# Patient Record
Sex: MALE | Race: OTHER | NOT HISPANIC OR LATINO | URBAN - METROPOLITAN AREA
[De-identification: names, ages, dates, MRNs, and addresses within clinical notes are randomized per-mention and may not be internally consistent; named-entity substitution may affect disease eponyms.]

---

## 2018-06-18 ENCOUNTER — INPATIENT (INPATIENT)
Facility: HOSPITAL | Age: 58
LOS: 22 days | Discharge: HOME | DRG: 871 | End: 2018-07-11
Attending: SURGERY | Admitting: SURGERY
Payer: COMMERCIAL

## 2018-06-18 VITALS
TEMPERATURE: 98 F | OXYGEN SATURATION: 95 % | DIASTOLIC BLOOD PRESSURE: 96 MMHG | WEIGHT: 237 LBS | SYSTOLIC BLOOD PRESSURE: 136 MMHG | RESPIRATION RATE: 20 BRPM | HEART RATE: 134 BPM | HEIGHT: 74 IN

## 2018-06-18 LAB
ALBUMIN SERPL ELPH-MCNC: 4.7 G/DL — SIGNIFICANT CHANGE UP (ref 3.3–5)
ALP SERPL-CCNC: 184 U/L — HIGH (ref 40–120)
ALT FLD-CCNC: 604 U/L — HIGH (ref 10–45)
ANION GAP SERPL CALC-SCNC: 20 MMOL/L — HIGH (ref 5–17)
APPEARANCE UR: ABNORMAL
APTT BLD: 24.9 SEC — LOW (ref 27.5–37.4)
AST SERPL-CCNC: 117 U/L — HIGH (ref 10–40)
BASE EXCESS BLDV CALC-SCNC: 2.3 MMOL/L — SIGNIFICANT CHANGE UP
BASOPHILS NFR BLD AUTO: 0.1 % — SIGNIFICANT CHANGE UP (ref 0–2)
BILIRUB SERPL-MCNC: 1.5 MG/DL — HIGH (ref 0.2–1.2)
BILIRUB UR-MCNC: NEGATIVE — SIGNIFICANT CHANGE UP
BLD GP AB SCN SERPL QL: NEGATIVE — SIGNIFICANT CHANGE UP
BUN SERPL-MCNC: 39 MG/DL — HIGH (ref 7–23)
CALCIUM SERPL-MCNC: 10 MG/DL — SIGNIFICANT CHANGE UP (ref 8.4–10.5)
CHLORIDE SERPL-SCNC: 84 MMOL/L — LOW (ref 96–108)
CK MB CFR SERPL CALC: 3.3 NG/ML — SIGNIFICANT CHANGE UP (ref 0–6.7)
CK SERPL-CCNC: 167 U/L — SIGNIFICANT CHANGE UP (ref 30–200)
CO2 SERPL-SCNC: 28 MMOL/L — SIGNIFICANT CHANGE UP (ref 22–31)
COLOR SPEC: YELLOW — SIGNIFICANT CHANGE UP
CREAT SERPL-MCNC: 1.95 MG/DL — HIGH (ref 0.5–1.3)
DIFF PNL FLD: ABNORMAL
GAS PNL BLDV: SIGNIFICANT CHANGE UP
GLUCOSE SERPL-MCNC: 209 MG/DL — HIGH (ref 70–99)
GLUCOSE UR QL: NEGATIVE — SIGNIFICANT CHANGE UP
HCO3 BLDV-SCNC: 27 MMOL/L — SIGNIFICANT CHANGE UP (ref 20–27)
HCT VFR BLD CALC: 54.4 % — HIGH (ref 39–50)
HGB BLD-MCNC: 18.7 G/DL — HIGH (ref 13–17)
INR BLD: 1.12 — SIGNIFICANT CHANGE UP (ref 0.88–1.16)
KETONES UR-MCNC: NEGATIVE — SIGNIFICANT CHANGE UP
LACTATE SERPL-SCNC: 5.3 MMOL/L — CRITICAL HIGH (ref 0.5–2)
LEUKOCYTE ESTERASE UR-ACNC: NEGATIVE — SIGNIFICANT CHANGE UP
LIDOCAIN IGE QN: >600 U/L — HIGH (ref 7–60)
LYMPHOCYTES # BLD AUTO: 7.8 % — LOW (ref 13–44)
MCHC RBC-ENTMCNC: 29.5 PG — SIGNIFICANT CHANGE UP (ref 27–34)
MCHC RBC-ENTMCNC: 34.4 G/DL — SIGNIFICANT CHANGE UP (ref 32–36)
MCV RBC AUTO: 85.8 FL — SIGNIFICANT CHANGE UP (ref 80–100)
MONOCYTES NFR BLD AUTO: 10 % — SIGNIFICANT CHANGE UP (ref 2–14)
NEUTROPHILS NFR BLD AUTO: 82.1 % — HIGH (ref 43–77)
NITRITE UR-MCNC: NEGATIVE — SIGNIFICANT CHANGE UP
OB PNL STL: NEGATIVE — SIGNIFICANT CHANGE UP
PCO2 BLDV: 42 MMHG — SIGNIFICANT CHANGE UP (ref 41–51)
PH BLDV: 7.43 — SIGNIFICANT CHANGE UP (ref 7.32–7.43)
PH UR: 5 — SIGNIFICANT CHANGE UP (ref 5–8)
PLATELET # BLD AUTO: 313 K/UL — SIGNIFICANT CHANGE UP (ref 150–400)
PO2 BLDV: 55 MMHG — SIGNIFICANT CHANGE UP
POTASSIUM SERPL-MCNC: 4.2 MMOL/L — SIGNIFICANT CHANGE UP (ref 3.5–5.3)
POTASSIUM SERPL-SCNC: 4.2 MMOL/L — SIGNIFICANT CHANGE UP (ref 3.5–5.3)
PROT SERPL-MCNC: 9 G/DL — HIGH (ref 6–8.3)
PROT UR-MCNC: 30 MG/DL
PROTHROM AB SERPL-ACNC: 12.5 SEC — SIGNIFICANT CHANGE UP (ref 9.8–12.7)
RBC # BLD: 6.34 M/UL — HIGH (ref 4.2–5.8)
RBC # FLD: 14.4 % — SIGNIFICANT CHANGE UP (ref 10.3–16.9)
RH IG SCN BLD-IMP: POSITIVE — SIGNIFICANT CHANGE UP
SAO2 % BLDV: 88 % — SIGNIFICANT CHANGE UP
SODIUM SERPL-SCNC: 132 MMOL/L — LOW (ref 135–145)
SP GR SPEC: <=1.005 — SIGNIFICANT CHANGE UP (ref 1–1.03)
TROPONIN T SERPL-MCNC: <0.01 NG/ML — SIGNIFICANT CHANGE UP (ref 0–0.01)
UROBILINOGEN FLD QL: 0.2 E.U./DL — SIGNIFICANT CHANGE UP
WBC # BLD: 16.2 K/UL — HIGH (ref 3.8–10.5)
WBC # FLD AUTO: 16.2 K/UL — HIGH (ref 3.8–10.5)

## 2018-06-18 PROCEDURE — 71045 X-RAY EXAM CHEST 1 VIEW: CPT | Mod: 26

## 2018-06-18 PROCEDURE — 99253 IP/OBS CNSLTJ NEW/EST LOW 45: CPT | Mod: GC

## 2018-06-18 PROCEDURE — 99285 EMERGENCY DEPT VISIT HI MDM: CPT | Mod: 25

## 2018-06-18 PROCEDURE — 76705 ECHO EXAM OF ABDOMEN: CPT | Mod: 26

## 2018-06-18 PROCEDURE — 74177 CT ABD & PELVIS W/CONTRAST: CPT | Mod: 26

## 2018-06-18 PROCEDURE — 93010 ELECTROCARDIOGRAM REPORT: CPT

## 2018-06-18 RX ORDER — SODIUM CHLORIDE 9 MG/ML
3 INJECTION INTRAMUSCULAR; INTRAVENOUS; SUBCUTANEOUS ONCE
Qty: 0 | Refills: 0 | Status: COMPLETED | OUTPATIENT
Start: 2018-06-18 | End: 2018-06-18

## 2018-06-18 RX ORDER — VANCOMYCIN HCL 1 G
1250 VIAL (EA) INTRAVENOUS ONCE
Qty: 0 | Refills: 0 | Status: COMPLETED | OUTPATIENT
Start: 2018-06-18 | End: 2018-06-18

## 2018-06-18 RX ORDER — SODIUM CHLORIDE 9 MG/ML
1000 INJECTION, SOLUTION INTRAVENOUS
Qty: 0 | Refills: 0 | Status: DISCONTINUED | OUTPATIENT
Start: 2018-06-18 | End: 2018-06-18

## 2018-06-18 RX ORDER — DEXTROSE 50 % IN WATER 50 %
15 SYRINGE (ML) INTRAVENOUS ONCE
Qty: 0 | Refills: 0 | Status: DISCONTINUED | OUTPATIENT
Start: 2018-06-18 | End: 2018-06-18

## 2018-06-18 RX ORDER — PANTOPRAZOLE SODIUM 20 MG/1
40 TABLET, DELAYED RELEASE ORAL DAILY
Qty: 0 | Refills: 0 | Status: DISCONTINUED | OUTPATIENT
Start: 2018-06-18 | End: 2018-06-18

## 2018-06-18 RX ORDER — DEXTROSE 50 % IN WATER 50 %
25 SYRINGE (ML) INTRAVENOUS ONCE
Qty: 0 | Refills: 0 | Status: DISCONTINUED | OUTPATIENT
Start: 2018-06-18 | End: 2018-06-18

## 2018-06-18 RX ORDER — LEVOTHYROXINE SODIUM 125 MCG
75 TABLET ORAL AT BEDTIME
Qty: 0 | Refills: 0 | Status: DISCONTINUED | OUTPATIENT
Start: 2018-06-18 | End: 2018-07-06

## 2018-06-18 RX ORDER — PIPERACILLIN AND TAZOBACTAM 4; .5 G/20ML; G/20ML
3.38 INJECTION, POWDER, LYOPHILIZED, FOR SOLUTION INTRAVENOUS ONCE
Qty: 0 | Refills: 0 | Status: COMPLETED | OUTPATIENT
Start: 2018-06-18 | End: 2018-06-18

## 2018-06-18 RX ORDER — SODIUM CHLORIDE 9 MG/ML
1000 INJECTION INTRAMUSCULAR; INTRAVENOUS; SUBCUTANEOUS ONCE
Qty: 0 | Refills: 0 | Status: COMPLETED | OUTPATIENT
Start: 2018-06-18 | End: 2018-06-18

## 2018-06-18 RX ORDER — PANTOPRAZOLE SODIUM 20 MG/1
80 TABLET, DELAYED RELEASE ORAL ONCE
Qty: 0 | Refills: 0 | Status: COMPLETED | OUTPATIENT
Start: 2018-06-18 | End: 2018-06-18

## 2018-06-18 RX ORDER — ONDANSETRON 8 MG/1
4 TABLET, FILM COATED ORAL ONCE
Qty: 0 | Refills: 0 | Status: COMPLETED | OUTPATIENT
Start: 2018-06-18 | End: 2018-06-18

## 2018-06-18 RX ORDER — DEXTROSE 50 % IN WATER 50 %
12.5 SYRINGE (ML) INTRAVENOUS ONCE
Qty: 0 | Refills: 0 | Status: DISCONTINUED | OUTPATIENT
Start: 2018-06-18 | End: 2018-06-18

## 2018-06-18 RX ORDER — GLUCAGON INJECTION, SOLUTION 0.5 MG/.1ML
1 INJECTION, SOLUTION SUBCUTANEOUS ONCE
Qty: 0 | Refills: 0 | Status: DISCONTINUED | OUTPATIENT
Start: 2018-06-18 | End: 2018-06-18

## 2018-06-18 RX ORDER — SODIUM CHLORIDE 9 MG/ML
1000 INJECTION INTRAMUSCULAR; INTRAVENOUS; SUBCUTANEOUS
Qty: 0 | Refills: 0 | Status: DISCONTINUED | OUTPATIENT
Start: 2018-06-18 | End: 2018-06-20

## 2018-06-18 RX ORDER — PANTOPRAZOLE SODIUM 20 MG/1
40 TABLET, DELAYED RELEASE ORAL
Qty: 0 | Refills: 0 | Status: DISCONTINUED | OUTPATIENT
Start: 2018-06-18 | End: 2018-06-19

## 2018-06-18 RX ORDER — ONDANSETRON 8 MG/1
4 TABLET, FILM COATED ORAL EVERY 6 HOURS
Qty: 0 | Refills: 0 | Status: DISCONTINUED | OUTPATIENT
Start: 2018-06-18 | End: 2018-06-22

## 2018-06-18 RX ORDER — INSULIN LISPRO 100/ML
VIAL (ML) SUBCUTANEOUS EVERY 6 HOURS
Qty: 0 | Refills: 0 | Status: DISCONTINUED | OUTPATIENT
Start: 2018-06-18 | End: 2018-06-21

## 2018-06-18 RX ORDER — HEPARIN SODIUM 5000 [USP'U]/ML
5000 INJECTION INTRAVENOUS; SUBCUTANEOUS EVERY 8 HOURS
Qty: 0 | Refills: 0 | Status: DISCONTINUED | OUTPATIENT
Start: 2018-06-18 | End: 2018-07-11

## 2018-06-18 RX ORDER — SODIUM CHLORIDE 9 MG/ML
500 INJECTION INTRAMUSCULAR; INTRAVENOUS; SUBCUTANEOUS
Qty: 0 | Refills: 0 | Status: COMPLETED | OUTPATIENT
Start: 2018-06-18 | End: 2018-06-18

## 2018-06-18 RX ORDER — MORPHINE SULFATE 50 MG/1
4 CAPSULE, EXTENDED RELEASE ORAL ONCE
Qty: 0 | Refills: 0 | Status: DISCONTINUED | OUTPATIENT
Start: 2018-06-18 | End: 2018-06-18

## 2018-06-18 RX ADMIN — MORPHINE SULFATE 4 MILLIGRAM(S): 50 CAPSULE, EXTENDED RELEASE ORAL at 21:09

## 2018-06-18 RX ADMIN — MORPHINE SULFATE 4 MILLIGRAM(S): 50 CAPSULE, EXTENDED RELEASE ORAL at 20:37

## 2018-06-18 RX ADMIN — SODIUM CHLORIDE 2000 MILLILITER(S): 9 INJECTION INTRAMUSCULAR; INTRAVENOUS; SUBCUTANEOUS at 21:47

## 2018-06-18 RX ADMIN — SODIUM CHLORIDE 1000 MILLILITER(S): 9 INJECTION INTRAMUSCULAR; INTRAVENOUS; SUBCUTANEOUS at 20:13

## 2018-06-18 RX ADMIN — SODIUM CHLORIDE 2000 MILLILITER(S): 9 INJECTION INTRAMUSCULAR; INTRAVENOUS; SUBCUTANEOUS at 20:35

## 2018-06-18 RX ADMIN — SODIUM CHLORIDE 1000 MILLILITER(S): 9 INJECTION INTRAMUSCULAR; INTRAVENOUS; SUBCUTANEOUS at 20:40

## 2018-06-18 RX ADMIN — SODIUM CHLORIDE 3 MILLILITER(S): 9 INJECTION INTRAMUSCULAR; INTRAVENOUS; SUBCUTANEOUS at 20:35

## 2018-06-18 RX ADMIN — ONDANSETRON 4 MILLIGRAM(S): 8 TABLET, FILM COATED ORAL at 20:40

## 2018-06-18 RX ADMIN — PIPERACILLIN AND TAZOBACTAM 200 GRAM(S): 4; .5 INJECTION, POWDER, LYOPHILIZED, FOR SOLUTION INTRAVENOUS at 20:35

## 2018-06-18 RX ADMIN — Medication 166.67 MILLIGRAM(S): at 21:32

## 2018-06-18 RX ADMIN — PANTOPRAZOLE SODIUM 80 MILLIGRAM(S): 20 TABLET, DELAYED RELEASE ORAL at 20:34

## 2018-06-18 RX ADMIN — SODIUM CHLORIDE 2000 MILLILITER(S): 9 INJECTION INTRAMUSCULAR; INTRAVENOUS; SUBCUTANEOUS at 20:50

## 2018-06-18 RX ADMIN — SODIUM CHLORIDE 2000 MILLILITER(S): 9 INJECTION INTRAMUSCULAR; INTRAVENOUS; SUBCUTANEOUS at 21:32

## 2018-06-18 NOTE — H&P ADULT - ATTENDING COMMENTS
Acute care surgery attending: Agree with general assessment and plan. Admit to acute care surgery service for resuscitation and management. Agree with SICU setting for management given potential for decompensation. Discussed with acute care surgeon of week, to be managed on ACS.

## 2018-06-18 NOTE — ED PROVIDER NOTE - OBJECTIVE STATEMENT
58M with known peptic ulcer presenting with persistent vomiting (more than 10 times) yesterday, severe abdominal pain, sx for 4 days. Pt tried Protonix at home without relief. Pt upgraded to me due to concern for tachycardia. Pt and wife note increased abdominal distension. No bloody or black stools. No blood in emesis.

## 2018-06-18 NOTE — H&P ADULT - NSHPPHYSICALEXAM_GEN_ALL_CORE
Alert and oriented x 3, in no acute distressCTAB, non-labored breathing on RA Alert and oriented x 3, in no acute distress  NGT in place draining bilious output  CTAB, non-labored breathing on RA  Tachycardic, normotensive  Abdomen soft, grossly distended, mild diffuse tenderness mostly in epigastric area, no rebound or guarding  Extremities: WWP

## 2018-06-18 NOTE — H&P ADULT - NSHPLABSRESULTS_GEN_ALL_CORE
Comprehensive Metabolic Panel (06.18.18 @ 19:50)    Sodium, Serum: 132 mmol/L    Potassium, Serum: 4.2 mmol/L    Chloride, Serum: 84 mmol/L    Carbon Dioxide, Serum: 28 mmol/L    Anion Gap, Serum: 20 mmol/L    Blood Urea Nitrogen, Serum: 39 mg/dL    Creatinine, Serum: 1.95 mg/dL    Glucose, Serum: 209 mg/dL    Calcium, Total Serum: 10.0 mg/dL    Protein Total, Serum: 9.0 g/dL    Albumin, Serum: 4.7 g/dL    Bilirubin Total, Serum: 1.5 mg/dL    Alkaline Phosphatase, Serum: 184 U/L    Aspartate Aminotransferase (AST/SGOT): 117 U/L    Alanine Aminotransferase (ALT/SGPT): 604 U/L    Complete Blood Count + Automated Diff (06.18.18 @ 19:50)    WBC Count: 16.2 K/uL    RBC Count: 6.34 M/uL    Hemoglobin: 18.7 g/dL    Hematocrit: 54.4 %    Mean Cell Volume: 85.8 fL    Mean Cell Hemoglobin: 29.5 pg    Mean Cell Hemoglobin Conc: 34.4 g/dL    Red Cell Distrib Width: 14.4 %    Platelet Count - Automated: 313 K/uL    Lactate, Blood (06.18.18 @ 19:49)    Lactate, Blood: 5.3: TYPE:(C=Critical, N=Notification, A=Abnormal) C    Urinalysis (06.18.18 @ 21:50)    pH Urine: 5.0    Glucose Qualitative, Urine: NEGATIVE    Blood, Urine: Moderate    Color: Yellow    Urine Appearance: SL Cloudy    Bilirubin: Negative    Ketone - Urine: NEGATIVE    Specific Gravity: <=1.005    Protein, Urine: 30 mg/dL    Urobilinogen: 0.2 E.U./dL    Nitrite: NEGATIVE    Leukocyte Esterase Concentration: NEGATIVE    < from: CT Abdomen and Pelvis w/ IV Cont (06.18.18 @ 20:14) >    IMPRESSION:     1. Acute pancreatitis.  2. Diffuse dilatation of the small bowel without evidence of abrupt   transition point. Findings may represent a reactive ileus.  3. Wall thickening of the rectum and distal sigmoid colon, most likely   reactive secondary to small volume surrounding ascites.  4. Cholelithiasis. Borderline distention of the common bileduct   measuring up to 6.8 mm. no radiopaque calculi noted within the visualized   segments of the common bile duct. There is tapering of the common bile   duct distally however the most distal portion of the duct appears to be   obscured by overlying soft tissue. Further evaluation with MRCP can be   obtained as clinically warranted.

## 2018-06-18 NOTE — ED PROVIDER NOTE - MEDICAL DECISION MAKING DETAILS
58M with persistent vomiting for 4 days abdominal distension, + stool no bloody/black stool. Doubt obstruction, concern for perf peptic ulcer, will obtain CT scan stat, give broad spectrum IV abx, likely surgical consult depending on CT results. If neg will admit to ICU for hydration. Pt with elevated lactate, elevated wbc count, concerning for intra-abdominal pathology, tachycardic, diaphoretic, will resusc with IV fluids and IV abx.

## 2018-06-18 NOTE — ED PROVIDER NOTE - PROGRESS NOTE DETAILS
Concern for perf peptic ulcer, will forgo creatinine as per wife and pt no known kidney issues, lactate elevated abd distension, concern for perf peptic ulcer Concern for perf peptic ulcer, will forgo creatinine as per wife and pt no known kidney issues, lactate elevated abd distension, concern for perf peptic ulcer, benefits outweigh risk Pt with concern for elevated liver enzymes, kidney function, will give aggressive IV fluids. Concern for perf peptic ulcer, called radiology to confirm, will call surg. Pt is back from CT scan called radiology regarding results, concerned for perf peptic ulcer, awaiting read from radiology. Radiology to call back, will inform surgery. Plan for aggressive IV hydration, Protonix, abx, Morphine/Zofran Spoke to surgery concern for pneumatosis intestinalis. Upon further questioning, pt states his doctor has been working him up for "high iron" had scheduled MR of liver to further eval, was awaiting approval from insurance. 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 Discussed with surgery concern for pneumatosis intestinalis. vs improving, surgery at bedside at 9 pm. discussed with rads/surg again, no e/o perf/pneumatosis intestinalis, however, concern for pancreatitis, with elevated lipase, LFTS causing reactive bowel edema. pt given aggressive hydration, will admit to surg icu, will obtain US

## 2018-06-18 NOTE — ED ADULT NURSE NOTE - OBJECTIVE STATEMENT
The patient is a 59 y/o male who came in c/o abdominal pain and nausea. Pt reports that he did not move his bowels for a couple of days until he arrived here in ED. Pt is aox4, denies any shortness of breath, fever or chills.

## 2018-06-18 NOTE — CONSULT NOTE ADULT - SUBJECTIVE AND OBJECTIVE BOX
58M hx of PUD (erosive gastritis and duodenitis, s/p EGD 6 weeks ago, on PPI) also hx of elevated iron (unknown cause, s/p hemochromatosis workup that revealed incidental finding of gallstone disease), presents to the ED with 3 days of abdominal pain, nausea and emesis and unable to tolerate PO. Denies fever, chills, constipation, melena or BRBPR, chest pain, SOB, palpitations or dizziness. Last alcohol use about 1 year ago. CT A/P and labs consistent with acute pancreatitis,     PMH: PUD (erosive gastritis and duodenitis, s/p EGD 6 weeks ago, on PPI), elevated iron (unknown cause, s/p hemochromatosis workup outpatient). gallstone.       ICU Vital Signs Last 24 Hrs  T(F): 98.5 (06-18-18 @ 23:19), Max: 98.5 (06-18-18 @ 23:19)  HR: 96 (06-18-18 @ 23:19) (96 - 134)  BP: 148/106 (06-18-18 @ 23:19) (131/95 - 148/106)  BP(mean): --  ABP: --  RR: 16 (06-18-18 @ 23:19) (16 - 20)  SpO2: 96% (06-18-18 @ 23:19) (95% - 96%)    PHYSICAL EXAM:     Neurological: AAOx3, CNII-XII intact,  strength 5/5 b/l  Cardiovascular: RRR  Respiratory: CTA  Gastrointestinal: soft, NT, ND, BS+  Extremities: warm, no dependent edema  Vascular: no cyanosis/erythema  LABS:    06-18    132<L>  |  84<L>  |  39<H>  ----------------------------<  209<H>  4.2   |  28  |  1.95<H>    Ca    10.0      18 Jun 2018 19:50    TPro  9.0<H>  /  Alb  4.7  /  TBili  1.5<H>  /  DBili  x   /  AST  117<H>  /  ALT  604<H>  /  AlkPhos  184<H>  06-18  LIVER FUNCTIONS - ( 18 Jun 2018 19:50 )  Alb: 4.7 g/dL / Pro: 9.0 g/dL / ALK PHOS: 184 U/L / ALT: 604 U/L / AST: 117 U/L / GGT: 1159 U/L                           18.7   16.2  )-----------( 313      ( 18 Jun 2018 19:50 )             54.4   PT/INR - ( 18 Jun 2018 21:05 )   PT: 12.5 sec;   INR: 1.12          PTT - ( 18 Jun 2018 21:05 )  PTT:24.9 secCARDIAC MARKERS ( 18 Jun 2018 19:50 )  x     / <0.01 ng/mL / 167 U/L / x     / 3.3 ng/mL    Urinalysis Basic - ( 18 Jun 2018 21:50 )    Color: Yellow / Appearance: SL Cloudy / SG: <=1.005 / pH: x  Gluc: x / Ketone: NEGATIVE  / Bili: Negative / Urobili: 0.2 E.U./dL   Blood: x / Protein: 30 mg/dL / Nitrite: NEGATIVE   Leuk Esterase: NEGATIVE / RBC: < 5 /HPF / WBC < 5 /HPF   Sq Epi: x / Non Sq Epi: 0-5 /HPF / Bacteria: Present /HPF    CAPILLARY BLOOD GLUCOSE        Victor:	  [ ] None	[ ] Daily Victor Order Placed	   Indication:	  [ ] Strict I and O's    [ ] Obstruction     [ ] Incontinence + Stage 3 or 4 Decubitus  Central Line:  [ ] None	   [ ]  Medication / TPN Administration     [ ] No Peripheral IV     A/P:    NEURO:  CV:  PULM:   GI/FEN:  :  ENDO:  ID:  PPX:  LINES:  WOUNDS/DRAINS: 58M hx of PUD (erosive gastritis and duodenitis, s/p EGD 6 weeks ago, on PPI) also hx of elevated iron (unknown cause, s/p hemochromatosis workup that revealed incidental finding of gallstone disease), presents to the ED with 3 days of abdominal pain, nausea and emesis and unable to tolerate PO. Denies fever, chills, constipation, melena or BRBPR, chest pain, SOB, palpitations or dizziness. Last alcohol use about 1 year ago. CT A/P and labs consistent with acute pancreatitis with cholelithiasis and borderline dilated CBD without obstructing stone. pt transferred to SICU for closer monitoring at this time.     PMH: PUD (erosive gastritis and duodenitis, s/p EGD 6 weeks ago, on PPI), elevated iron (unknown cause, s/p hemochromatosis workup outpatient). gallstone.       ICU Vital Signs Last 24 Hrs  T(F): 98.5 (06-18-18 @ 23:19), Max: 98.5 (06-18-18 @ 23:19)  HR: 96 (06-18-18 @ 23:19) (96 - 134)  BP: 148/106 (06-18-18 @ 23:19) (131/95 - 148/106)  BP(mean): --  ABP: --  RR: 16 (06-18-18 @ 23:19) (16 - 20)  SpO2: 96% (06-18-18 @ 23:19) (95% - 96%)    PHYSICAL EXAM:     Neurological: AAOx3, CNII-XII intact,  strength 5/5 b/l  Cardiovascular: RRR  Respiratory: CTA  Gastrointestinal: soft, NT, ND, BS+  Extremities: warm, no dependent edema  Vascular: no cyanosis/erythema  LABS:    06-18    132<L>  |  84<L>  |  39<H>  ----------------------------<  209<H>  4.2   |  28  |  1.95<H>    Ca    10.0      18 Jun 2018 19:50    TPro  9.0<H>  /  Alb  4.7  /  TBili  1.5<H>  /  DBili  x   /  AST  117<H>  /  ALT  604<H>  /  AlkPhos  184<H>  06-18  LIVER FUNCTIONS - ( 18 Jun 2018 19:50 )  Alb: 4.7 g/dL / Pro: 9.0 g/dL / ALK PHOS: 184 U/L / ALT: 604 U/L / AST: 117 U/L / GGT: 1159 U/L                           18.7   16.2  )-----------( 313      ( 18 Jun 2018 19:50 )             54.4   PT/INR - ( 18 Jun 2018 21:05 )   PT: 12.5 sec;   INR: 1.12          PTT - ( 18 Jun 2018 21:05 )  PTT:24.9 secCARDIAC MARKERS ( 18 Jun 2018 19:50 )  x     / <0.01 ng/mL / 167 U/L / x     / 3.3 ng/mL    Urinalysis Basic - ( 18 Jun 2018 21:50 )    Color: Yellow / Appearance: SL Cloudy / SG: <=1.005 / pH: x  Gluc: x / Ketone: NEGATIVE  / Bili: Negative / Urobili: 0.2 E.U./dL   Blood: x / Protein: 30 mg/dL / Nitrite: NEGATIVE   Leuk Esterase: NEGATIVE / RBC: < 5 /HPF / WBC < 5 /HPF   Sq Epi: x / Non Sq Epi: 0-5 /HPF / Bacteria: Present /HPF    CAPILLARY BLOOD GLUCOSE    CT A/P:   1. Acute pancreatitis.  2. Diffuse dilatation of the small bowel without evidence of abrupt   transition point. Findings may represent a reactive ileus.  3. Wall thickening of the rectum and distal sigmoid colon, most likely   reactive secondary to small volume surrounding ascites.  4. Cholelithiasis. Borderline distention of the common bile duct   measuring up to 6.8 mm. no radiopaque calculi noted within the visualized   segments of the common bile duct. There is tapering of the common bile   duct distally however the most distal portion of the duct appears to be   obscured by overlying soft tissue. Further evaluation with MRCP can be   obtained as clinically warranted.      Luciano:	  [ ] None	[x ] Daily Luciano Order Placed	   Indication:	  [ x] Strict I and O's    [ ] Obstruction     [ ] Incontinence + Stage 3 or 4 Decubitus  Central Line:  [x ] None	   [ ]  Medication / TPN Administration     [ ] No Peripheral IV     A/P: 58M hx duodenitis/gastritis, gallstones, admitted with acute pancreatitis, suspect possible gallstone [facility]itis[facility]     NEURO: dilaudid PRN pain.   CV: normotensive at this time. continue monitor.   PULM: no respiratory distress at this time, on nasal canula PRN, watch for pulm edema while pt receive high volume of IVF.   GI/FEN: cont NPO with n/v, NGT placed. on LIWS. zofran PRN.   : luciano for strict I&O, OXANA suspect related to dehydration - receiving IVF resuscitation. cont LR@200/hr, watch fluid status carefully.   ENDO: glucose slightly high on admission labs. monitor serial fingerstick, cover with ISS  ID: no abx at this time. pan culture if febrile.    DVT PPX: SQH.   LINES: PIV.   WOUNDS/DRAINS: NGT to LIWS. 58M hx of PUD (erosive gastritis and duodenitis, s/p EGD 6 weeks ago, on PPI) also hx of elevated iron (unknown cause, s/p hemochromatosis workup that revealed incidental finding of gallstone disease), presents to the ED with 3 days of abdominal pain, nausea and emesis and unable to tolerate PO. Denies fever, chills, constipation, melena or BRBPR, chest pain, SOB, palpitations or dizziness. Last alcohol use about 1 year ago. CT A/P and labs consistent with acute pancreatitis with cholelithiasis and borderline dilated CBD without obstructing stone. pt transferred to SICU for closer monitoring at this time.     PMH: PUD (erosive gastritis and duodenitis, s/p EGD 6 weeks ago, on PPI), elevated iron (unknown cause, s/p hemochromatosis workup outpatient). gallstone. hypothyroid  HOME MEDS: synthroid.       ICU Vital Signs Last 24 Hrs  T(F): 98.5 (06-18-18 @ 23:19), Max: 98.5 (06-18-18 @ 23:19)  HR: 96 (06-18-18 @ 23:19) (96 - 134)  BP: 148/106 (06-18-18 @ 23:19) (131/95 - 148/106)  BP(mean): --  ABP: --  RR: 16 (06-18-18 @ 23:19) (16 - 20)  SpO2: 96% (06-18-18 @ 23:19) (95% - 96%)    PHYSICAL EXAM:     Neurological: AAOx3, CNII-XII intact,  strength 5/5 b/l  Cardiovascular: RRR  Respiratory: CTA  Gastrointestinal: soft, NT, ND, BS+  Extremities: warm, no dependent edema  Vascular: no cyanosis/erythema  LABS:    06-18    132<L>  |  84<L>  |  39<H>  ----------------------------<  209<H>  4.2   |  28  |  1.95<H>    Ca    10.0      18 Jun 2018 19:50    TPro  9.0<H>  /  Alb  4.7  /  TBili  1.5<H>  /  DBili  x   /  AST  117<H>  /  ALT  604<H>  /  AlkPhos  184<H>  06-18  LIVER FUNCTIONS - ( 18 Jun 2018 19:50 )  Alb: 4.7 g/dL / Pro: 9.0 g/dL / ALK PHOS: 184 U/L / ALT: 604 U/L / AST: 117 U/L / GGT: 1159 U/L                           18.7   16.2  )-----------( 313      ( 18 Jun 2018 19:50 )             54.4   PT/INR - ( 18 Jun 2018 21:05 )   PT: 12.5 sec;   INR: 1.12          PTT - ( 18 Jun 2018 21:05 )  PTT:24.9 secCARDIAC MARKERS ( 18 Jun 2018 19:50 )  x     / <0.01 ng/mL / 167 U/L / x     / 3.3 ng/mL    Urinalysis Basic - ( 18 Jun 2018 21:50 )    Color: Yellow / Appearance: SL Cloudy / SG: <=1.005 / pH: x  Gluc: x / Ketone: NEGATIVE  / Bili: Negative / Urobili: 0.2 E.U./dL   Blood: x / Protein: 30 mg/dL / Nitrite: NEGATIVE   Leuk Esterase: NEGATIVE / RBC: < 5 /HPF / WBC < 5 /HPF   Sq Epi: x / Non Sq Epi: 0-5 /HPF / Bacteria: Present /HPF    CAPILLARY BLOOD GLUCOSE    CT A/P:   1. Acute pancreatitis.  2. Diffuse dilatation of the small bowel without evidence of abrupt   transition point. Findings may represent a reactive ileus.  3. Wall thickening of the rectum and distal sigmoid colon, most likely   reactive secondary to small volume surrounding ascites.  4. Cholelithiasis. Borderline distention of the common bile duct   measuring up to 6.8 mm. no radiopaque calculi noted within the visualized   segments of the common bile duct. There is tapering of the common bile   duct distally however the most distal portion of the duct appears to be   obscured by overlying soft tissue. Further evaluation with MRCP can be   obtained as clinically warranted.      Luciano:	  [ ] None	[x ] Daily Luciano Order Placed	   Indication:	  [ x] Strict I and O's    [ ] Obstruction     [ ] Incontinence + Stage 3 or 4 Decubitus  Central Line:  [x ] None	   [ ]  Medication / TPN Administration     [ ] No Peripheral IV     A/P: 58M hx duodenitis/gastritis, gallstones, admitted with acute pancreatitis, suspect possible gallstone pancreatitis. transferred to SICU for closer monitoring.     NEURO: dilaudid PRN pain.   CV: normotensive at this time. continue monitor.   PULM: no respiratory distress at this time, on nasal canula PRN, watch for pulm edema while pt receive high volume of IVF.   GI/FEN: cont NPO with n/v, NGT placed. on LIWS. zofran PRN.   : luciano for strict I&O, OXANA suspect related to dehydration - receiving IVF resuscitation. cont LR@200/hr, watch fluid status carefully.   ENDO: glucose slightly high on admission labs. monitor serial fingerstick, cover with ISS  ID: no abx at this time. pan culture if febrile.    DVT PPX: SQH.   LINES: PIV.   WOUNDS/DRAINS: NGT to LIWS. 58M hx of PUD (erosive gastritis and duodenitis, s/p EGD 6 weeks ago, on PPI) also hx of elevated iron (unknown cause, s/p hemochromatosis workup that revealed incidental finding of gallstone disease), presents to the ED with 3 days of abdominal pain, nausea and emesis and unable to tolerate PO. Denies fever, chills, constipation, melena or BRBPR, chest pain, SOB, palpitations or dizziness. Last alcohol use about 1 year ago. CT A/P and labs consistent with acute pancreatitis with cholelithiasis and borderline dilated CBD without obstructing stone. pt transferred to SICU for closer monitoring at this time.     PMH: PUD (erosive gastritis and duodenitis, s/p EGD 6 weeks ago, on PPI), elevated iron (unknown cause, s/p hemochromatosis workup outpatient). gallstone. hypothyroid  HOME MEDS: synthroid. protonix.       ICU Vital Signs Last 24 Hrs  T(F): 98.5 (06-18-18 @ 23:19), Max: 98.5 (06-18-18 @ 23:19)  HR: 96 (06-18-18 @ 23:19) (96 - 134)  BP: 148/106 (06-18-18 @ 23:19) (131/95 - 148/106)  BP(mean): --  ABP: --  RR: 16 (06-18-18 @ 23:19) (16 - 20)  SpO2: 96% (06-18-18 @ 23:19) (95% - 96%)    PHYSICAL EXAM:     Neurological: AAOx3, CNII-XII intact,  strength 5/5 b/l  Cardiovascular: RRR  Respiratory: CTA  Gastrointestinal:   Extremities: warm, no dependent edema  Vascular: no cyanosis/erythema    LABS:    06-18    132<L>  |  84<L>  |  39<H>  ----------------------------<  209<H>  4.2   |  28  |  1.95<H>    Ca    10.0      18 Jun 2018 19:50    TPro  9.0<H>  /  Alb  4.7  /  TBili  1.5<H>  /  DBili  x   /  AST  117<H>  /  ALT  604<H>  /  AlkPhos  184<H>  06-18  LIVER FUNCTIONS - ( 18 Jun 2018 19:50 )  Alb: 4.7 g/dL / Pro: 9.0 g/dL / ALK PHOS: 184 U/L / ALT: 604 U/L / AST: 117 U/L / GGT: 1159 U/L                           18.7   16.2  )-----------( 313      ( 18 Jun 2018 19:50 )             54.4   PT/INR - ( 18 Jun 2018 21:05 )   PT: 12.5 sec;   INR: 1.12          PTT - ( 18 Jun 2018 21:05 )  PTT:24.9 secCARDIAC MARKERS ( 18 Jun 2018 19:50 )  x     / <0.01 ng/mL / 167 U/L / x     / 3.3 ng/mL    Urinalysis Basic - ( 18 Jun 2018 21:50 )    Color: Yellow / Appearance: SL Cloudy / SG: <=1.005 / pH: x  Gluc: x / Ketone: NEGATIVE  / Bili: Negative / Urobili: 0.2 E.U./dL   Blood: x / Protein: 30 mg/dL / Nitrite: NEGATIVE   Leuk Esterase: NEGATIVE / RBC: < 5 /HPF / WBC < 5 /HPF   Sq Epi: x / Non Sq Epi: 0-5 /HPF / Bacteria: Present /HPF    CAPILLARY BLOOD GLUCOSE    CT A/P:   1. Acute pancreatitis.  2. Diffuse dilatation of the small bowel without evidence of abrupt   transition point. Findings may represent a reactive ileus.  3. Wall thickening of the rectum and distal sigmoid colon, most likely   reactive secondary to small volume surrounding ascites.  4. Cholelithiasis. Borderline distention of the common bile duct   measuring up to 6.8 mm. no radiopaque calculi noted within the visualized   segments of the common bile duct. There is tapering of the common bile   duct distally however the most distal portion of the duct appears to be   obscured by overlying soft tissue. Further evaluation with MRCP can be   obtained as clinically warranted.      Luciano:	  [ ] None	[x ] Daily Luciano Order Placed	   Indication:	  [ x] Strict I and O's    [ ] Obstruction     [ ] Incontinence + Stage 3 or 4 Decubitus  Central Line:  [x ] None	   [ ]  Medication / TPN Administration     [ ] No Peripheral IV     A/P: 58M hx duodenitis/gastritis, gallstones, admitted with acute pancreatitis, suspect possible gallstone pancreatitis. transferred to SICU for closer monitoring.     NEURO: dilaudid PRN pain.   CV: normotensive at this time. continue monitor.   PULM: no respiratory distress at this time, on nasal canula PRN, watch for pulm edema while pt receive high volume of IVF.   GI/FEN: cont NPO with n/v, NGT on LIWS. zofran PRN. protonix for duodenitis/gastritis. RUQ U/S pending. MRCP ordered.   : luciano for strict I&O, OXANA, no known hx of kidney disease, suspect related to dehydration - receiving IVF resuscitation. cont LR@200/hr, watch fluid status carefully.   ENDO: glucose slightly high on admission labs. monitor serial fingerstick, cover with ISS, hypothyoid - cont home synthroid.   ID: no abx at this time. pan-culture if febrile.    DVT PPX: SQH.   LINES: PIV.   WOUNDS/DRAINS: NGT to LIWS. 58M hx of PUD (erosive gastritis and duodenitis, s/p EGD 6 weeks ago, on PPI) also hx of elevated iron (unknown cause, s/p hemochromatosis workup that revealed incidental finding of gallstone disease), presents to the ED with 3 days of abdominal pain, nausea and emesis and unable to tolerate PO. Denies fever, chills, constipation, melena or BRBPR, chest pain, SOB, palpitations or dizziness. Last alcohol use about 1 year ago. CT A/P and labs consistent with acute pancreatitis with cholelithiasis and borderline dilated CBD without obstructing stone. pt transferred to SICU for closer monitoring at this time.     PMH: PUD (erosive gastritis and duodenitis, s/p EGD 6 weeks ago, on PPI), elevated iron (unknown cause, s/p hemochromatosis workup outpatient). gallstone. hypothyroid  HOME MEDS: synthroid. protonix.       ICU Vital Signs Last 24 Hrs  T(F): 98.5 (06-18-18 @ 23:19), Max: 98.5 (06-18-18 @ 23:19)  HR: 96 (06-18-18 @ 23:19) (96 - 134)  BP: 148/106 (06-18-18 @ 23:19) (131/95 - 148/106)  BP(mean): --  ABP: --  RR: 16 (06-18-18 @ 23:19) (16 - 20)  SpO2: 96% (06-18-18 @ 23:19) (95% - 96%)    PHYSICAL EXAM:     Neurological: AAOx3, CNII-XII intact,  strength 5/5 b/l  Cardiovascular: RRR  Respiratory: CTA  Gastrointestinal:   Extremities: warm, no dependent edema  Vascular: no cyanosis/erythema    LABS:    06-18    132<L>  |  84<L>  |  39<H>  ----------------------------<  209<H>  4.2   |  28  |  1.95<H>    Ca    10.0      18 Jun 2018 19:50    TPro  9.0<H>  /  Alb  4.7  /  TBili  1.5<H>  /  DBili  x   /  AST  117<H>  /  ALT  604<H>  /  AlkPhos  184<H>  06-18  LIVER FUNCTIONS - ( 18 Jun 2018 19:50 )  Alb: 4.7 g/dL / Pro: 9.0 g/dL / ALK PHOS: 184 U/L / ALT: 604 U/L / AST: 117 U/L / GGT: 1159 U/L                           18.7   16.2  )-----------( 313      ( 18 Jun 2018 19:50 )             54.4   PT/INR - ( 18 Jun 2018 21:05 )   PT: 12.5 sec;   INR: 1.12          PTT - ( 18 Jun 2018 21:05 )  PTT:24.9 secCARDIAC MARKERS ( 18 Jun 2018 19:50 )  x     / <0.01 ng/mL / 167 U/L / x     / 3.3 ng/mL    Urinalysis Basic - ( 18 Jun 2018 21:50 )    Color: Yellow / Appearance: SL Cloudy / SG: <=1.005 / pH: x  Gluc: x / Ketone: NEGATIVE  / Bili: Negative / Urobili: 0.2 E.U./dL   Blood: x / Protein: 30 mg/dL / Nitrite: NEGATIVE   Leuk Esterase: NEGATIVE / RBC: < 5 /HPF / WBC < 5 /HPF   Sq Epi: x / Non Sq Epi: 0-5 /HPF / Bacteria: Present /HPF    CAPILLARY BLOOD GLUCOSE    CT A/P:   1. Acute pancreatitis.  2. Diffuse dilatation of the small bowel without evidence of abrupt   transition point. Findings may represent a reactive ileus.  3. Wall thickening of the rectum and distal sigmoid colon, most likely   reactive secondary to small volume surrounding ascites.  4. Cholelithiasis. Borderline distention of the common bile duct   measuring up to 6.8 mm. no radiopaque calculi noted within the visualized   segments of the common bile duct. There is tapering of the common bile   duct distally however the most distal portion of the duct appears to be   obscured by overlying soft tissue. Further evaluation with MRCP can be   obtained as clinically warranted.      Luciano:	  [ ] None	[x ] Daily Luciano Order Placed	   Indication:	  [ x] Strict I and O's    [ ] Obstruction     [ ] Incontinence + Stage 3 or 4 Decubitus  Central Line:  [x ] None	   [ ]  Medication / TPN Administration     [ ] No Peripheral IV     A/P: 58M hx duodenitis/gastritis, gallstones, admitted with acute pancreatitis, suspect possible gallstone pancreatitis. transferred to SICU for closer monitoring.     NEURO: dilaudid PRN pain.   CV: normotensive at this time. continue monitor.   PULM: no respiratory distress at this time, on nasal canula PRN, watch for pulm edema while pt receive high volume of IVF.   GI/FEN: cont NPO with n/v, NGT on LIWS. zofran PRN. protonix for duodenitis/gastritis. RUQ U/S pending. MRCP ordered.   : luciano for strict I&O, OXANA, no known hx of kidney disease, suspect related to dehydration - receiving IVF resuscitation, watch fluid status carefully.   ENDO: glucose slightly high on admission labs. monitor serial fingerstick, cover with ISS, hypothyoid - cont home synthroid.   ID: no abx at this time. pan-culture if febrile.    DVT PPX: SQH.   LINES: PIV.   WOUNDS/DRAINS: NGT to LIWS. 58M hx of PUD (erosive gastritis and duodenitis, s/p EGD 6 weeks ago, on PPI) also hx of elevated iron (unknown cause, s/p hemochromatosis workup that revealed incidental finding of gallstone disease), presents to the ED with 3 days of abdominal pain, nausea and emesis and unable to tolerate PO. Denies fever, chills, constipation, melena or BRBPR, chest pain, SOB, palpitations or dizziness. Last alcohol use about 1 year ago. CT A/P and labs consistent with acute pancreatitis with cholelithiasis and borderline dilated CBD without obstructing stone. pt transferred to SICU for closer monitoring at this time.     PMH: PUD (erosive gastritis and duodenitis, s/p EGD 6 weeks ago, on PPI), elevated iron (unknown cause, s/p hemochromatosis workup outpatient). gallstone. hypothyroid  HOME MEDS: synthroid 150 qd. protonix qd.       ICU Vital Signs Last 24 Hrs  T(F): 98.5 (06-18-18 @ 23:19), Max: 98.5 (06-18-18 @ 23:19)  HR: 96 (06-18-18 @ 23:19) (96 - 134)  BP: 148/106 (06-18-18 @ 23:19) (131/95 - 148/106)  BP(mean): --  ABP: --  RR: 16 (06-18-18 @ 23:19) (16 - 20)  SpO2: 96% (06-18-18 @ 23:19) (95% - 96%)    PHYSICAL EXAM:     Neurological: AAOx3, CNII-XII intact,  strength 5/5 b/l  Cardiovascular: RRR  Respiratory: CTA  Gastrointestinal:   Extremities: warm, no dependent edema  Vascular: no cyanosis/erythema    LABS:    06-18    132<L>  |  84<L>  |  39<H>  ----------------------------<  209<H>  4.2   |  28  |  1.95<H>    Ca    10.0      18 Jun 2018 19:50    TPro  9.0<H>  /  Alb  4.7  /  TBili  1.5<H>  /  DBili  x   /  AST  117<H>  /  ALT  604<H>  /  AlkPhos  184<H>  06-18  LIVER FUNCTIONS - ( 18 Jun 2018 19:50 )  Alb: 4.7 g/dL / Pro: 9.0 g/dL / ALK PHOS: 184 U/L / ALT: 604 U/L / AST: 117 U/L / GGT: 1159 U/L                           18.7   16.2  )-----------( 313      ( 18 Jun 2018 19:50 )             54.4   PT/INR - ( 18 Jun 2018 21:05 )   PT: 12.5 sec;   INR: 1.12          PTT - ( 18 Jun 2018 21:05 )  PTT:24.9 secCARDIAC MARKERS ( 18 Jun 2018 19:50 )  x     / <0.01 ng/mL / 167 U/L / x     / 3.3 ng/mL    Urinalysis Basic - ( 18 Jun 2018 21:50 )    Color: Yellow / Appearance: SL Cloudy / SG: <=1.005 / pH: x  Gluc: x / Ketone: NEGATIVE  / Bili: Negative / Urobili: 0.2 E.U./dL   Blood: x / Protein: 30 mg/dL / Nitrite: NEGATIVE   Leuk Esterase: NEGATIVE / RBC: < 5 /HPF / WBC < 5 /HPF   Sq Epi: x / Non Sq Epi: 0-5 /HPF / Bacteria: Present /HPF    CAPILLARY BLOOD GLUCOSE    CT A/P:   1. Acute pancreatitis.  2. Diffuse dilatation of the small bowel without evidence of abrupt   transition point. Findings may represent a reactive ileus.  3. Wall thickening of the rectum and distal sigmoid colon, most likely   reactive secondary to small volume surrounding ascites.  4. Cholelithiasis. Borderline distention of the common bile duct   measuring up to 6.8 mm. no radiopaque calculi noted within the visualized   segments of the common bile duct. There is tapering of the common bile   duct distally however the most distal portion of the duct appears to be   obscured by overlying soft tissue. Further evaluation with MRCP can be   obtained as clinically warranted.      Luciano:	  [ ] None	[x ] Daily Luciano Order Placed	   Indication:	  [ x] Strict I and O's    [ ] Obstruction     [ ] Incontinence + Stage 3 or 4 Decubitus  Central Line:  [x ] None	   [ ]  Medication / TPN Administration     [ ] No Peripheral IV     A/P: 58M hx duodenitis/gastritis, gallstones, admitted with acute pancreatitis, suspect possible gallstone pancreatitis. transferred to SICU for closer monitoring.     NEURO: dilaudid PRN pain.   CV: normotensive at this time. continue monitor.   PULM: no respiratory distress at this time, on nasal canula PRN, watch for pulm edema while pt receive high volume of IVF.   GI/FEN: cont NPO with n/v, NGT on LIWS. zofran PRN. protonix for duodenitis/gastritis. RUQ U/S pending. MRCP ordered.   : luciano for strict I&O, OXANA, no known hx of kidney disease, suspect related to dehydration - receiving IVF resuscitation, watch fluid status carefully.   ENDO: glucose slightly high on admission labs. monitor serial fingerstick, cover with ISS, hypothyoid - cont home synthroid.   ID: no abx at this time. pan-culture if febrile.    DVT PPX: SQH.   LINES: PIV.   WOUNDS/DRAINS: NGT to LIWS.

## 2018-06-18 NOTE — H&P ADULT - HISTORY OF PRESENT ILLNESS
57 yo male with hx of PUD (erosive gastritis and duodenitis, s/p EGD 6 weeks ago, on PPI) also hx of elevated iron (unknown cause, s/p hemochromatosis workup that revealed incidental finding of gallstone disease), presents to the ED with 3 days of abdominal pain, nausea and emesis. Pain started 3 days ago, localized to epigastric area first, then diffuse, associated with nausea and multiple episodes of emesis yesterday. Today patient denies emesis, but still complaints of pain, is unable to tolerate PO except for water and has constant hiccups. Last diarrheal BM today upon arrival to ED, + flatus. Denies fever, chills, urinary symptoms, constipation, melena or BRBPR, chest pain, SOB, palpitations or dizziness    Hx of marihuana use 1 week ago in [facility]  Last alcohol use about 1 year ago

## 2018-06-18 NOTE — ED PROVIDER NOTE - PHYSICAL EXAMINATION
Gen: diaphoretic, in discomfort due to pain, conversant  HEENT: oropharynx clear  CV: rrr no murmur  Resp: ctab  Abd: distended, diffuse tenderness to palpation  Ext: no edema to ext  MSK: no cva TTP, no midline back ttp c-s spine ambulatory

## 2018-06-18 NOTE — CONSULT NOTE ADULT - ATTENDING COMMENTS
This patient was evaluated with the house staff and management decisions were made, see above for the details.  I agree with the A/P.  -Acute pancreatitis  -Cholelithiasis  -hypothyroidism  -Gastritis  >IVF  >Gastritis  >NPO  >PPI  >RISS  >SQH

## 2018-06-18 NOTE — H&P ADULT - ASSESSMENT
57 yo male with acute pancreatitis most probably 2/2 gallstone pancreatitis and reactive ileus.     - admit to surgery under care of Dr. Jones   - no surgical intervention indicated at this time   - serial abdominal exams   - NPO   - NGT decompression   - aggressive IVF hydration, NS@200cc/hr   - anti-emesis medications   - prn pain treatment   - Victor for stricty I&Os   - DVT ppx: SCDs, SQH   - pulm toilet/IS   - repeat labs/replete lytes as needed/ trend lactate   - f/u RUQ US   - home Synthroid/PPI (IV interchange)   - ICU level of care

## 2018-06-18 NOTE — ED ADULT NURSE NOTE - CHPI ED SYMPTOMS NEG
no hematuria/no blood in stool/no diarrhea/no burning urination/no chills/no abdominal distension/no dysuria/no fever

## 2018-06-19 LAB
ALBUMIN SERPL ELPH-MCNC: 3.8 G/DL — SIGNIFICANT CHANGE UP (ref 3.3–5)
ALBUMIN SERPL ELPH-MCNC: 3.9 G/DL — SIGNIFICANT CHANGE UP (ref 3.3–5)
ALP SERPL-CCNC: 133 U/L — HIGH (ref 40–120)
ALP SERPL-CCNC: 140 U/L — HIGH (ref 40–120)
ALT FLD-CCNC: 379 U/L — HIGH (ref 10–45)
ALT FLD-CCNC: 434 U/L — HIGH (ref 10–45)
AMYLASE P1 CFR SERPL: 443 U/L — HIGH (ref 25–125)
AMYLASE P1 CFR SERPL: 550 U/L — HIGH (ref 25–125)
ANION GAP SERPL CALC-SCNC: 10 MMOL/L — SIGNIFICANT CHANGE UP (ref 5–17)
ANION GAP SERPL CALC-SCNC: 11 MMOL/L — SIGNIFICANT CHANGE UP (ref 5–17)
ANION GAP SERPL CALC-SCNC: 12 MMOL/L — SIGNIFICANT CHANGE UP (ref 5–17)
AST SERPL-CCNC: 66 U/L — HIGH (ref 10–40)
AST SERPL-CCNC: 78 U/L — HIGH (ref 10–40)
BASOPHILS NFR BLD AUTO: 0.1 % — SIGNIFICANT CHANGE UP (ref 0–2)
BILIRUB SERPL-MCNC: 1.3 MG/DL — HIGH (ref 0.2–1.2)
BILIRUB SERPL-MCNC: 1.4 MG/DL — HIGH (ref 0.2–1.2)
BLD GP AB SCN SERPL QL: NEGATIVE — SIGNIFICANT CHANGE UP
BUN SERPL-MCNC: 35 MG/DL — HIGH (ref 7–23)
BUN SERPL-MCNC: 38 MG/DL — HIGH (ref 7–23)
BUN SERPL-MCNC: 38 MG/DL — HIGH (ref 7–23)
CALCIUM SERPL-MCNC: 8.1 MG/DL — LOW (ref 8.4–10.5)
CALCIUM SERPL-MCNC: 8.6 MG/DL — SIGNIFICANT CHANGE UP (ref 8.4–10.5)
CALCIUM SERPL-MCNC: 8.7 MG/DL — SIGNIFICANT CHANGE UP (ref 8.4–10.5)
CHLORIDE SERPL-SCNC: 92 MMOL/L — LOW (ref 96–108)
CHLORIDE SERPL-SCNC: 92 MMOL/L — LOW (ref 96–108)
CHLORIDE SERPL-SCNC: 95 MMOL/L — LOW (ref 96–108)
CO2 SERPL-SCNC: 25 MMOL/L — SIGNIFICANT CHANGE UP (ref 22–31)
CO2 SERPL-SCNC: 29 MMOL/L — SIGNIFICANT CHANGE UP (ref 22–31)
CO2 SERPL-SCNC: 31 MMOL/L — SIGNIFICANT CHANGE UP (ref 22–31)
CREAT SERPL-MCNC: 1.01 MG/DL — SIGNIFICANT CHANGE UP (ref 0.5–1.3)
CREAT SERPL-MCNC: 1.32 MG/DL — HIGH (ref 0.5–1.3)
CREAT SERPL-MCNC: 1.33 MG/DL — HIGH (ref 0.5–1.3)
CULTURE RESULTS: NO GROWTH — SIGNIFICANT CHANGE UP
GGT SERPL-CCNC: 883 U/L — HIGH (ref 9–50)
GLUCOSE BLDC GLUCOMTR-MCNC: 118 MG/DL — HIGH (ref 70–99)
GLUCOSE BLDC GLUCOMTR-MCNC: 129 MG/DL — HIGH (ref 70–99)
GLUCOSE BLDC GLUCOMTR-MCNC: 133 MG/DL — HIGH (ref 70–99)
GLUCOSE BLDC GLUCOMTR-MCNC: 140 MG/DL — HIGH (ref 70–99)
GLUCOSE BLDC GLUCOMTR-MCNC: 150 MG/DL — HIGH (ref 70–99)
GLUCOSE SERPL-MCNC: 141 MG/DL — HIGH (ref 70–99)
GLUCOSE SERPL-MCNC: 154 MG/DL — HIGH (ref 70–99)
GLUCOSE SERPL-MCNC: 165 MG/DL — HIGH (ref 70–99)
HBA1C BLD-MCNC: 5.6 % — SIGNIFICANT CHANGE UP (ref 4–5.6)
HCT VFR BLD CALC: 48.7 % — SIGNIFICANT CHANGE UP (ref 39–50)
HCT VFR BLD CALC: 49.8 % — SIGNIFICANT CHANGE UP (ref 39–50)
HCT VFR BLD CALC: 50.1 % — HIGH (ref 39–50)
HGB BLD-MCNC: 16.7 G/DL — SIGNIFICANT CHANGE UP (ref 13–17)
HGB BLD-MCNC: 17 G/DL — SIGNIFICANT CHANGE UP (ref 13–17)
HGB BLD-MCNC: 17 G/DL — SIGNIFICANT CHANGE UP (ref 13–17)
LACTATE SERPL-SCNC: 1.4 MMOL/L — SIGNIFICANT CHANGE UP (ref 0.5–2)
LIDOCAIN IGE QN: 275 U/L — HIGH (ref 7–60)
LIDOCAIN IGE QN: 481 U/L — HIGH (ref 7–60)
LYMPHOCYTES # BLD AUTO: 9.2 % — LOW (ref 13–44)
MAGNESIUM SERPL-MCNC: 3.4 MG/DL — HIGH (ref 1.6–2.6)
MCHC RBC-ENTMCNC: 29.3 PG — SIGNIFICANT CHANGE UP (ref 27–34)
MCHC RBC-ENTMCNC: 29.5 PG — SIGNIFICANT CHANGE UP (ref 27–34)
MCHC RBC-ENTMCNC: 29.8 PG — SIGNIFICANT CHANGE UP (ref 27–34)
MCHC RBC-ENTMCNC: 33.9 G/DL — SIGNIFICANT CHANGE UP (ref 32–36)
MCHC RBC-ENTMCNC: 34.1 G/DL — SIGNIFICANT CHANGE UP (ref 32–36)
MCHC RBC-ENTMCNC: 34.3 G/DL — SIGNIFICANT CHANGE UP (ref 32–36)
MCV RBC AUTO: 86.3 FL — SIGNIFICANT CHANGE UP (ref 80–100)
MCV RBC AUTO: 86.4 FL — SIGNIFICANT CHANGE UP (ref 80–100)
MCV RBC AUTO: 86.8 FL — SIGNIFICANT CHANGE UP (ref 80–100)
MONOCYTES NFR BLD AUTO: 14.3 % — HIGH (ref 2–14)
NEUTROPHILS NFR BLD AUTO: 76.4 % — SIGNIFICANT CHANGE UP (ref 43–77)
PHOSPHATE SERPL-MCNC: 2.9 MG/DL — SIGNIFICANT CHANGE UP (ref 2.5–4.5)
PHOSPHATE SERPL-MCNC: 2.9 MG/DL — SIGNIFICANT CHANGE UP (ref 2.5–4.5)
PLATELET # BLD AUTO: 243 K/UL — SIGNIFICANT CHANGE UP (ref 150–400)
PLATELET # BLD AUTO: 248 K/UL — SIGNIFICANT CHANGE UP (ref 150–400)
PLATELET # BLD AUTO: 248 K/UL — SIGNIFICANT CHANGE UP (ref 150–400)
POTASSIUM SERPL-MCNC: 4.7 MMOL/L — SIGNIFICANT CHANGE UP (ref 3.5–5.3)
POTASSIUM SERPL-MCNC: 4.9 MMOL/L — SIGNIFICANT CHANGE UP (ref 3.5–5.3)
POTASSIUM SERPL-MCNC: 5 MMOL/L — SIGNIFICANT CHANGE UP (ref 3.5–5.3)
POTASSIUM SERPL-SCNC: 4.7 MMOL/L — SIGNIFICANT CHANGE UP (ref 3.5–5.3)
POTASSIUM SERPL-SCNC: 4.9 MMOL/L — SIGNIFICANT CHANGE UP (ref 3.5–5.3)
POTASSIUM SERPL-SCNC: 5 MMOL/L — SIGNIFICANT CHANGE UP (ref 3.5–5.3)
PROT SERPL-MCNC: 7.1 G/DL — SIGNIFICANT CHANGE UP (ref 6–8.3)
PROT SERPL-MCNC: 7.4 G/DL — SIGNIFICANT CHANGE UP (ref 6–8.3)
RBC # BLD: 5.61 M/UL — SIGNIFICANT CHANGE UP (ref 4.2–5.8)
RBC # BLD: 5.77 M/UL — SIGNIFICANT CHANGE UP (ref 4.2–5.8)
RBC # BLD: 5.8 M/UL — SIGNIFICANT CHANGE UP (ref 4.2–5.8)
RBC # FLD: 13.9 % — SIGNIFICANT CHANGE UP (ref 10.3–16.9)
RBC # FLD: 14.1 % — SIGNIFICANT CHANGE UP (ref 10.3–16.9)
RBC # FLD: 14.2 % — SIGNIFICANT CHANGE UP (ref 10.3–16.9)
RH IG SCN BLD-IMP: POSITIVE — SIGNIFICANT CHANGE UP
SODIUM SERPL-SCNC: 131 MMOL/L — LOW (ref 135–145)
SODIUM SERPL-SCNC: 132 MMOL/L — LOW (ref 135–145)
SODIUM SERPL-SCNC: 134 MMOL/L — LOW (ref 135–145)
SPECIMEN SOURCE: SIGNIFICANT CHANGE UP
WBC # BLD: 12.9 K/UL — HIGH (ref 3.8–10.5)
WBC # BLD: 8.1 K/UL — SIGNIFICANT CHANGE UP (ref 3.8–10.5)
WBC # BLD: 9.2 K/UL — SIGNIFICANT CHANGE UP (ref 3.8–10.5)
WBC # FLD AUTO: 12.9 K/UL — HIGH (ref 3.8–10.5)
WBC # FLD AUTO: 8.1 K/UL — SIGNIFICANT CHANGE UP (ref 3.8–10.5)
WBC # FLD AUTO: 9.2 K/UL — SIGNIFICANT CHANGE UP (ref 3.8–10.5)

## 2018-06-19 PROCEDURE — 99222 1ST HOSP IP/OBS MODERATE 55: CPT | Mod: GC

## 2018-06-19 PROCEDURE — 99233 SBSQ HOSP IP/OBS HIGH 50: CPT | Mod: GC

## 2018-06-19 PROCEDURE — 71045 X-RAY EXAM CHEST 1 VIEW: CPT | Mod: 26,76

## 2018-06-19 RX ORDER — PANTOPRAZOLE SODIUM 20 MG/1
40 TABLET, DELAYED RELEASE ORAL
Qty: 0 | Refills: 0 | Status: DISCONTINUED | OUTPATIENT
Start: 2018-06-19 | End: 2018-06-19

## 2018-06-19 RX ORDER — FENTANYL CITRATE 50 UG/ML
12.5 INJECTION INTRAVENOUS
Qty: 0 | Refills: 0 | Status: DISCONTINUED | OUTPATIENT
Start: 2018-06-19 | End: 2018-06-20

## 2018-06-19 RX ORDER — SODIUM CHLORIDE 9 MG/ML
1000 INJECTION INTRAMUSCULAR; INTRAVENOUS; SUBCUTANEOUS ONCE
Qty: 0 | Refills: 0 | Status: DISCONTINUED | OUTPATIENT
Start: 2018-06-19 | End: 2018-06-19

## 2018-06-19 RX ORDER — PANTOPRAZOLE SODIUM 20 MG/1
40 TABLET, DELAYED RELEASE ORAL DAILY
Qty: 0 | Refills: 0 | Status: DISCONTINUED | OUTPATIENT
Start: 2018-06-19 | End: 2018-06-19

## 2018-06-19 RX ORDER — FENTANYL CITRATE 50 UG/ML
25 INJECTION INTRAVENOUS
Qty: 0 | Refills: 0 | Status: DISCONTINUED | OUTPATIENT
Start: 2018-06-19 | End: 2018-06-20

## 2018-06-19 RX ORDER — PANTOPRAZOLE SODIUM 20 MG/1
40 TABLET, DELAYED RELEASE ORAL DAILY
Qty: 0 | Refills: 0 | Status: DISCONTINUED | OUTPATIENT
Start: 2018-06-19 | End: 2018-07-06

## 2018-06-19 RX ADMIN — FENTANYL CITRATE 25 MICROGRAM(S): 50 INJECTION INTRAVENOUS at 23:00

## 2018-06-19 RX ADMIN — Medication 75 MICROGRAM(S): at 21:59

## 2018-06-19 RX ADMIN — FENTANYL CITRATE 25 MICROGRAM(S): 50 INJECTION INTRAVENOUS at 21:58

## 2018-06-19 RX ADMIN — FENTANYL CITRATE 25 MICROGRAM(S): 50 INJECTION INTRAVENOUS at 13:10

## 2018-06-19 RX ADMIN — SODIUM CHLORIDE 200 MILLILITER(S): 9 INJECTION INTRAMUSCULAR; INTRAVENOUS; SUBCUTANEOUS at 03:41

## 2018-06-19 RX ADMIN — HEPARIN SODIUM 5000 UNIT(S): 5000 INJECTION INTRAVENOUS; SUBCUTANEOUS at 14:59

## 2018-06-19 RX ADMIN — FENTANYL CITRATE 25 MICROGRAM(S): 50 INJECTION INTRAVENOUS at 12:06

## 2018-06-19 RX ADMIN — PANTOPRAZOLE SODIUM 40 MILLIGRAM(S): 20 TABLET, DELAYED RELEASE ORAL at 06:15

## 2018-06-19 RX ADMIN — HEPARIN SODIUM 5000 UNIT(S): 5000 INJECTION INTRAVENOUS; SUBCUTANEOUS at 21:57

## 2018-06-19 RX ADMIN — FENTANYL CITRATE 25 MICROGRAM(S): 50 INJECTION INTRAVENOUS at 16:05

## 2018-06-19 RX ADMIN — FENTANYL CITRATE 25 MICROGRAM(S): 50 INJECTION INTRAVENOUS at 14:59

## 2018-06-19 RX ADMIN — HEPARIN SODIUM 5000 UNIT(S): 5000 INJECTION INTRAVENOUS; SUBCUTANEOUS at 06:15

## 2018-06-19 RX ADMIN — FENTANYL CITRATE 25 MICROGRAM(S): 50 INJECTION INTRAVENOUS at 19:51

## 2018-06-19 RX ADMIN — SODIUM CHLORIDE 250 MILLILITER(S): 9 INJECTION INTRAMUSCULAR; INTRAVENOUS; SUBCUTANEOUS at 09:00

## 2018-06-19 NOTE — CONSULT NOTE ADULT - ASSESSMENT
58M with hx hypothyroid on synthroid and recent EGD found to have gastritis/duodenitis presents with epigastric pain, nausea vomiting found to have elevated liver tests and pancreatic enzymes with radiographic evidence of gallstones 58M with hx hypothyroid on synthroid and recent EGD found to have gastritis/duodenitis presents with epigastric pain, nausea vomiting found to have elevated liver tests and pancreatic enzymes with radiographic evidence of gallstones, borderline dilated CBD up to 6.8mm with gradual tapering distally at the level of the ampulla and no choledocholithiasis seen, as well as moderate peripancreatic fat stranding c/w acute pancreatitis, likely gallstone pancreatitis with no current evidence of cholangitis and improving liver tests suspicious for passed stone.  - NPO  - LR @>200cc/hr  - pain mgmt  - MRCP as per surgery  - ERCP based on MRCP results

## 2018-06-19 NOTE — PROGRESS NOTE ADULT - SUBJECTIVE AND OBJECTIVE BOX
6/19: NGT removed, IVF@250cc/hr. Pending MRCP.  ON: Admitted with gallstone pancreatitis, septic with lactate 5, repeat normalized. CT also sig for reactive ileus, ?proctitis. RUQ US done    ICU Vital Signs Last 24 Hrs  T(C): 36.9 (19 Jun 2018 09:15), Max: 37.1 (18 Jun 2018 23:54)  T(F): 98.4 (19 Jun 2018 09:15), Max: 98.7 (18 Jun 2018 23:54)  HR: 92 (19 Jun 2018 11:00) (76 - 134)  BP: 139/100 (19 Jun 2018 11:00) (126/84 - 170/116)  BP(mean): 117 (19 Jun 2018 11:00) (97 - 145)  ABP: --  ABP(mean): --  RR: 17 (19 Jun 2018 11:00) (14 - 24)  SpO2: 95% (19 Jun 2018 11:00) (91% - 97%)      I&O's Summary    18 Jun 2018 07:01  -  19 Jun 2018 07:00  --------------------------------------------------------  IN: 1400 mL / OUT: 1575 mL / NET: -175 mL    19 Jun 2018 07:01  -  19 Jun 2018 12:43  --------------------------------------------------------  IN: 1000 mL / OUT: 370 mL / NET: 630 mL        PHYSICAL EXAM:   Gen: NAD  Neurological: AAOx3, grossly intact   Cardiovascular: RRR, no murmurs or gallops   Respiratory: CTA bilaterally   Gastrointestinal: soft, mildly distended, ttp over epigastric region   Extremities: warm, no dependent edema  Vascular: no cyanosis/erythema          LABS:    06-18    132<L>  |  84<L>  |  39<H>  ----------------------------<  209<H>  4.2   |  28  |  1.95<H>    Ca    10.0      18 Jun 2018 19:50    TPro  9.0<H>  /  Alb  4.7  /  TBili  1.5<H>  /  DBili  x   /  AST  117<H>  /  ALT  604<H>  /  AlkPhos  184<H>  06-18  LIVER FUNCTIONS - ( 18 Jun 2018 19:50 )  Alb: 4.7 g/dL / Pro: 9.0 g/dL / ALK PHOS: 184 U/L / ALT: 604 U/L / AST: 117 U/L / GGT: 1159 U/L                           18.7   16.2  )-----------( 313      ( 18 Jun 2018 19:50 )             54.4   PT/INR - ( 18 Jun 2018 21:05 )   PT: 12.5 sec;   INR: 1.12          PTT - ( 18 Jun 2018 21:05 )  PTT:24.9 secCARDIAC MARKERS ( 18 Jun 2018 19:50 )  x     / <0.01 ng/mL / 167 U/L / x     / 3.3 ng/mL    Urinalysis Basic - ( 18 Jun 2018 21:50 )    Color: Yellow / Appearance: SL Cloudy / SG: <=1.005 / pH: x  Gluc: x / Ketone: NEGATIVE  / Bili: Negative / Urobili: 0.2 E.U./dL   Blood: x / Protein: 30 mg/dL / Nitrite: NEGATIVE   Leuk Esterase: NEGATIVE / RBC: < 5 /HPF / WBC < 5 /HPF   Sq Epi: x / Non Sq Epi: 0-5 /HPF / Bacteria: Present /HPF    CAPILLARY BLOOD GLUCOSE    CT A/P:   1. Acute pancreatitis.  2. Diffuse dilatation of the small bowel without evidence of abrupt   transition point. Findings may represent a reactive ileus.  3. Wall thickening of the rectum and distal sigmoid colon, most likely   reactive secondary to small volume surrounding ascites.  4. Cholelithiasis. Borderline distention of the common bile duct   measuring up to 6.8 mm. no radiopaque calculi noted within the visualized   segments of the common bile duct. There is tapering of the common bile   duct distally however the most distal portion of the duct appears to be   obscured by overlying soft tissue. Further evaluation with MRCP can be   obtained as clinically warranted.      Luciano:	  [ ] None	[x ] Daily Luciano Order Placed	   Indication:	  [ x] Strict I and O's    [ ] Obstruction     [ ] Incontinence + Stage 3 or 4 Decubitus  Central Line:  [x ] None	   [ ]  Medication / TPN Administration     [ ] No Peripheral IV     A/P: 58M hx duodenitis/gastritis, gallstones, admitted with acute pancreatitis, suspect possible gallstone pancreatitis. transferred to SICU for closer monitoring.     NEURO: dilaudid PRN pain.   CV: normotensive at this time. continue monitor.   PULM: no respiratory distress at this time, on nasal canula PRN, watch for pulm edema while pt receive high volume of IVF.   GI/FEN: cont NPO with n/v, NGT on LIWS. zofran PRN. protonix for duodenitis/gastritis. RUQ U/S pending. MRCP ordered.   : luciano for strict I&O, OXANA, no known hx of kidney disease, suspect related to dehydration - receiving IVF resuscitation, watch fluid status carefully.   ENDO: glucose slightly high on admission labs. monitor serial fingerstick, cover with ISS, hypothyoid - cont home synthroid.   ID: no abx at this time. pan-culture if febrile.    DVT PPX: SQH.   LINES: PIV.   WOUNDS/DRAINS: NGT to LIWS. 6/19: NGT removed, IVF@250cc/hr. Pending MRCP.  ON: Admitted with gallstone pancreatitis, septic with lactate 5, repeat normalized. CT also sig for reactive ileus, ?proctitis. RUQ US done    ICU Vital Signs Last 24 Hrs  T(C): 36.9 (19 Jun 2018 09:15), Max: 37.1 (18 Jun 2018 23:54)  T(F): 98.4 (19 Jun 2018 09:15), Max: 98.7 (18 Jun 2018 23:54)  HR: 92 (19 Jun 2018 11:00) (76 - 134)  BP: 139/100 (19 Jun 2018 11:00) (126/84 - 170/116)  BP(mean): 117 (19 Jun 2018 11:00) (97 - 145)  ABP: --  ABP(mean): --  RR: 17 (19 Jun 2018 11:00) (14 - 24)  SpO2: 95% (19 Jun 2018 11:00) (91% - 97%)      I&O's Summary    18 Jun 2018 07:01  -  19 Jun 2018 07:00  --------------------------------------------------------  IN: 1400 mL / OUT: 1575 mL / NET: -175 mL    19 Jun 2018 07:01  -  19 Jun 2018 12:43  --------------------------------------------------------  IN: 1000 mL / OUT: 370 mL / NET: 630 mL        PHYSICAL EXAM:   Gen: NAD  Neurological: AAOx3, grossly intact   Cardiovascular: RRR, no murmurs or gallops   Respiratory: CTA bilaterally   Gastrointestinal: soft, mildly distended, ttp over epigastric region   Extremities: warm, no dependent edema  Vascular: no cyanosis/erythema with 2+ radial and DP pulses  MSK: no joint swelling  : luciano in place  Psych: affect appropriate          LABS:    06-18    132<L>  |  84<L>  |  39<H>  ----------------------------<  209<H>  4.2   |  28  |  1.95<H>    Ca    10.0      18 Jun 2018 19:50    TPro  9.0<H>  /  Alb  4.7  /  TBili  1.5<H>  /  DBili  x   /  AST  117<H>  /  ALT  604<H>  /  AlkPhos  184<H>  06-18  LIVER FUNCTIONS - ( 18 Jun 2018 19:50 )  Alb: 4.7 g/dL / Pro: 9.0 g/dL / ALK PHOS: 184 U/L / ALT: 604 U/L / AST: 117 U/L / GGT: 1159 U/L                           18.7   16.2  )-----------( 313      ( 18 Jun 2018 19:50 )             54.4   PT/INR - ( 18 Jun 2018 21:05 )   PT: 12.5 sec;   INR: 1.12          PTT - ( 18 Jun 2018 21:05 )  PTT:24.9 secCARDIAC MARKERS ( 18 Jun 2018 19:50 )  x     / <0.01 ng/mL / 167 U/L / x     / 3.3 ng/mL    Urinalysis Basic - ( 18 Jun 2018 21:50 )    Color: Yellow / Appearance: SL Cloudy / SG: <=1.005 / pH: x  Gluc: x / Ketone: NEGATIVE  / Bili: Negative / Urobili: 0.2 E.U./dL   Blood: x / Protein: 30 mg/dL / Nitrite: NEGATIVE   Leuk Esterase: NEGATIVE / RBC: < 5 /HPF / WBC < 5 /HPF   Sq Epi: x / Non Sq Epi: 0-5 /HPF / Bacteria: Present /HPF    CAPILLARY BLOOD GLUCOSE    CT A/P:   1. Acute pancreatitis.  2. Diffuse dilatation of the small bowel without evidence of abrupt   transition point. Findings may represent a reactive ileus.  3. Wall thickening of the rectum and distal sigmoid colon, most likely   reactive secondary to small volume surrounding ascites.  4. Cholelithiasis. Borderline distention of the common bile duct   measuring up to 6.8 mm. no radiopaque calculi noted within the visualized   segments of the common bile duct. There is tapering of the common bile   duct distally however the most distal portion of the duct appears to be   obscured by overlying soft tissue. Further evaluation with MRCP can be   obtained as clinically warranted.      Luciano:	  [ ] None	[x ] Daily Luciano Order Placed	   Indication:	  [ x] Strict I and O's    [ ] Obstruction     [ ] Incontinence + Stage 3 or 4 Decubitus  Central Line:  [x ] None	   [ ]  Medication / TPN Administration     [ ] No Peripheral IV     A/P: 58M hx duodenitis/gastritis, gallstones, admitted with acute pancreatitis, suspect possible gallstone pancreatitis with dehydration suggested by elevated BUN and hemoconcentration, transferred to SICU for closer monitoring.     NEURO: fentanyl PRN pain.   CV: normotensive at this time. continue monitor with  ml/hr given persistent azotemia/hemoconcentration. UO is acceptable.   PULM: no respiratory distress at this time, on nasal canula PRN, watch for pulm edema while pt receive high volume of IVF.   GI/FEN: cont NPO with n/v, NGT on LIWS. zofran PRN. protonix for duodenitis/gastritis. RUQ U/S pending. MRCP ordered.   : luciano for strict I&O, OXANA, no known hx of kidney disease, suspect related to dehydration with resulting ATN- receiving IVF resuscitation, watch fluid status carefully.   ENDO: glucose slightly high on admission labs. monitor serial fingerstick, cover with ISS, hypothyoid - cont home synthroid.   ID: no abx at this time. pan-culture if febrile.    DVT PPX: SQH.   LINES: PIV.   WOUNDS/DRAINS: NGT to LIWS.

## 2018-06-19 NOTE — PROGRESS NOTE ADULT - ASSESSMENT
A/P: 58M hx duodenitis/gastritis, gallstones, admitted with acute pancreatitis likely due to gallstones   NEURO: fentanyl PRN for pain   CV: hemodynamically stable, NS @ 250 cc/hr    PULM: no respiratory distress at this time, on nasal canula PRN  GI/FEN:  NPO, NGT if patient vomits given ileus on CT. zofran PRN. protonix for duodenitis/gastritis. MRCP ordered to further investigate biliary anatomy   : luciano for strict I&O, OXANA likely pre renal due to dehydration w/ downtrending Cr / BUN    ENDO: ISS, hypothyoid - cont w/ home synthroid.   ID: no abx  indicated at this time  DVT PPX: SQH.   LINES: PIV.

## 2018-06-19 NOTE — CONSULT NOTE ADULT - SUBJECTIVE AND OBJECTIVE BOX
HPI:  59 yo male with hx of PUD (erosive gastritis and duodenitis, s/p EGD 6 weeks ago, on PPI) done by DR. HARRISON also hx of elevated iron (unknown cause, s/p hemochromatosis workup that revealed incidental finding of gallstone disease), presents to the ED with 3 days of abdominal pain, nausea and emesis. Pain started 3 days ago, localized to epigastric area first, then diffuse, associated with nausea and multiple episodes of emesis yesterday. Today patient denies emesis, but still complaints of pain, is unable to tolerate PO except for water and has constant hiccups. Last diarrheal BM today upon arrival to ED, + flatus. Denies fever, chills, urinary symptoms, constipation, melena or BRBPR, chest pain, SOB, palpitations or dizziness  Pt states that had similar pain but less severe about a week ago that resolved spontaneously.     Hx of marihuana use 1 week ago in [facility]  Last alcohol use about 1 year ago (18 Jun 2018 22:02)      PAST MEDICAL & SURGICAL HISTORY:  PUD (peptic ulcer disease)  No significant past surgical history      REVIEW OF SYSTEMS as per HPI      MEDICATIONS  (STANDING):  heparin  Injectable 5000 Unit(s) SubCutaneous every 8 hours  insulin lispro (HumaLOG) corrective regimen sliding scale   SubCutaneous every 6 hours  levothyroxine Injectable 75 MICROGram(s) IV Push at bedtime  pantoprazole  Injectable 40 milliGRAM(s) IV Push daily  sodium chloride 0.9%. 1000 milliLiter(s) (250 mL/Hr) IV Continuous <Continuous>    MEDICATIONS  (PRN):  fentaNYL    Injectable 12.5 MICROGram(s) IV Push every 2 hours PRN Moderate Pain (4 - 6)  fentaNYL    Injectable 25 MICROGram(s) IV Push every 2 hours PRN Severe Pain (7 - 10)  ondansetron Injectable 4 milliGRAM(s) IV Push every 6 hours PRN Nausea      Allergies    No Known Allergies    Intolerances        SOCIAL HISTORY: as above     FAMILY HISTORY:  No pertinent family history in first degree relatives      Vital Signs Last 24 Hrs  T(C): 36.4 (19 Jun 2018 12:56), Max: 37.1 (18 Jun 2018 23:54)  T(F): 97.6 (19 Jun 2018 12:56), Max: 98.7 (18 Jun 2018 23:54)  HR: 92 (19 Jun 2018 11:00) (76 - 134)  BP: 139/100 (19 Jun 2018 11:00) (126/84 - 170/116)  BP(mean): 117 (19 Jun 2018 11:00) (97 - 145)  RR: 17 (19 Jun 2018 11:00) (14 - 24)  SpO2: 95% (19 Jun 2018 11:00) (91% - 97%)    PHYSICAL EXAM:      Constitutional:  M in pain     Eyes: anicteric    Respiratory: no distress    Cardiovascular: s1s2	    Gastrointestinal: distended tympanic TTP epigastrium    Genitourinary: luciano with concentrated urine    Neurological: aaox3    Skin: no jaundice, rash      LABS:                        17.0   9.2   )-----------( 248      ( 19 Jun 2018 06:20 )             50.1     06-19    134<L>  |  92<L>  |  35<H>  ----------------------------<  141<H>  4.9   |  31  |  1.32<H>    Ca    8.7      19 Jun 2018 06:18  Phos  2.9     06-19  Mg     3.4     06-19    TPro  7.1  /  Alb  3.9  /  TBili  1.3<H>  /  DBili  x   /  AST  66<H>  /  ALT  379<H>  /  AlkPhos  133<H>  06-19    PT/INR - ( 18 Jun 2018 21:05 )   PT: 12.5 sec;   INR: 1.12          PTT - ( 18 Jun 2018 21:05 )  PTT:24.9 sec  Urinalysis Basic - ( 18 Jun 2018 21:50 )    Color: Yellow / Appearance: SL Cloudy / SG: <=1.005 / pH: x  Gluc: x / Ketone: NEGATIVE  / Bili: Negative / Urobili: 0.2 E.U./dL   Blood: x / Protein: 30 mg/dL / Nitrite: NEGATIVE   Leuk Esterase: NEGATIVE / RBC: < 5 /HPF / WBC < 5 /HPF   Sq Epi: x / Non Sq Epi: 0-5 /HPF / Bacteria: Present /HPF        RADIOLOGY & ADDITIONAL STUDIES:

## 2018-06-20 LAB
ALBUMIN SERPL ELPH-MCNC: 3.1 G/DL — LOW (ref 3.3–5)
ALP SERPL-CCNC: 92 U/L — SIGNIFICANT CHANGE UP (ref 40–120)
ALT FLD-CCNC: 207 U/L — HIGH (ref 10–45)
AMYLASE P1 CFR SERPL: 124 U/L — SIGNIFICANT CHANGE UP (ref 25–125)
ANION GAP SERPL CALC-SCNC: 13 MMOL/L — SIGNIFICANT CHANGE UP (ref 5–17)
AST SERPL-CCNC: 34 U/L — SIGNIFICANT CHANGE UP (ref 10–40)
BILIRUB DIRECT SERPL-MCNC: <0.2 MG/DL — SIGNIFICANT CHANGE UP (ref 0–0.2)
BILIRUB INDIRECT FLD-MCNC: >0.8 MG/DL — SIGNIFICANT CHANGE UP (ref 0.2–1)
BILIRUB SERPL-MCNC: 1 MG/DL — SIGNIFICANT CHANGE UP (ref 0.2–1.2)
BUN SERPL-MCNC: 29 MG/DL — HIGH (ref 7–23)
CALCIUM SERPL-MCNC: 8.2 MG/DL — LOW (ref 8.4–10.5)
CHLORIDE SERPL-SCNC: 95 MMOL/L — LOW (ref 96–108)
CO2 SERPL-SCNC: 24 MMOL/L — SIGNIFICANT CHANGE UP (ref 22–31)
CREAT SERPL-MCNC: 0.89 MG/DL — SIGNIFICANT CHANGE UP (ref 0.5–1.3)
GLUCOSE BLDC GLUCOMTR-MCNC: 102 MG/DL — HIGH (ref 70–99)
GLUCOSE BLDC GLUCOMTR-MCNC: 105 MG/DL — HIGH (ref 70–99)
GLUCOSE BLDC GLUCOMTR-MCNC: 115 MG/DL — HIGH (ref 70–99)
GLUCOSE BLDC GLUCOMTR-MCNC: 94 MG/DL — SIGNIFICANT CHANGE UP (ref 70–99)
GLUCOSE SERPL-MCNC: 128 MG/DL — HIGH (ref 70–99)
HCT VFR BLD CALC: 45.6 % — SIGNIFICANT CHANGE UP (ref 39–50)
HGB BLD-MCNC: 15.5 G/DL — SIGNIFICANT CHANGE UP (ref 13–17)
LIDOCAIN IGE QN: 54 U/L — SIGNIFICANT CHANGE UP (ref 7–60)
MAGNESIUM SERPL-MCNC: 3.3 MG/DL — HIGH (ref 1.6–2.6)
MCHC RBC-ENTMCNC: 30 PG — SIGNIFICANT CHANGE UP (ref 27–34)
MCHC RBC-ENTMCNC: 34 G/DL — SIGNIFICANT CHANGE UP (ref 32–36)
MCV RBC AUTO: 88.4 FL — SIGNIFICANT CHANGE UP (ref 80–100)
PHOSPHATE SERPL-MCNC: 1.8 MG/DL — LOW (ref 2.5–4.5)
PLATELET # BLD AUTO: 204 K/UL — SIGNIFICANT CHANGE UP (ref 150–400)
POTASSIUM SERPL-MCNC: 5 MMOL/L — SIGNIFICANT CHANGE UP (ref 3.5–5.3)
POTASSIUM SERPL-SCNC: 5 MMOL/L — SIGNIFICANT CHANGE UP (ref 3.5–5.3)
PROT SERPL-MCNC: 6.8 G/DL — SIGNIFICANT CHANGE UP (ref 6–8.3)
RBC # BLD: 5.16 M/UL — SIGNIFICANT CHANGE UP (ref 4.2–5.8)
RBC # FLD: 14.2 % — SIGNIFICANT CHANGE UP (ref 10.3–16.9)
SODIUM SERPL-SCNC: 132 MMOL/L — LOW (ref 135–145)
WBC # BLD: 6.4 K/UL — SIGNIFICANT CHANGE UP (ref 3.8–10.5)
WBC # FLD AUTO: 6.4 K/UL — SIGNIFICANT CHANGE UP (ref 3.8–10.5)

## 2018-06-20 PROCEDURE — 99232 SBSQ HOSP IP/OBS MODERATE 35: CPT | Mod: GC

## 2018-06-20 PROCEDURE — 71045 X-RAY EXAM CHEST 1 VIEW: CPT | Mod: 26

## 2018-06-20 PROCEDURE — 99233 SBSQ HOSP IP/OBS HIGH 50: CPT | Mod: GC

## 2018-06-20 RX ORDER — KETOROLAC TROMETHAMINE 30 MG/ML
30 SYRINGE (ML) INJECTION EVERY 6 HOURS
Qty: 0 | Refills: 0 | Status: DISCONTINUED | OUTPATIENT
Start: 2018-06-20 | End: 2018-06-20

## 2018-06-20 RX ORDER — SODIUM CHLORIDE 9 MG/ML
1000 INJECTION INTRAMUSCULAR; INTRAVENOUS; SUBCUTANEOUS
Qty: 0 | Refills: 0 | Status: DISCONTINUED | OUTPATIENT
Start: 2018-06-20 | End: 2018-06-21

## 2018-06-20 RX ORDER — CHLORHEXIDINE GLUCONATE 213 G/1000ML
1 SOLUTION TOPICAL DAILY
Qty: 0 | Refills: 0 | Status: DISCONTINUED | OUTPATIENT
Start: 2018-06-20 | End: 2018-06-24

## 2018-06-20 RX ORDER — KETOROLAC TROMETHAMINE 30 MG/ML
15 SYRINGE (ML) INJECTION EVERY 6 HOURS
Qty: 0 | Refills: 0 | Status: DISCONTINUED | OUTPATIENT
Start: 2018-06-20 | End: 2018-06-21

## 2018-06-20 RX ORDER — KETOROLAC TROMETHAMINE 30 MG/ML
30 SYRINGE (ML) INJECTION ONCE
Qty: 0 | Refills: 0 | Status: DISCONTINUED | OUTPATIENT
Start: 2018-06-20 | End: 2018-06-20

## 2018-06-20 RX ORDER — ACETAMINOPHEN 500 MG
1000 TABLET ORAL ONCE
Qty: 0 | Refills: 0 | Status: COMPLETED | OUTPATIENT
Start: 2018-06-20 | End: 2018-06-20

## 2018-06-20 RX ORDER — ACETAMINOPHEN 500 MG
1000 TABLET ORAL ONCE
Qty: 0 | Refills: 0 | Status: COMPLETED | OUTPATIENT
Start: 2018-06-21 | End: 2018-06-21

## 2018-06-20 RX ORDER — FENTANYL CITRATE 50 UG/ML
12.5 INJECTION INTRAVENOUS
Qty: 0 | Refills: 0 | Status: DISCONTINUED | OUTPATIENT
Start: 2018-06-20 | End: 2018-06-21

## 2018-06-20 RX ADMIN — FENTANYL CITRATE 25 MICROGRAM(S): 50 INJECTION INTRAVENOUS at 16:00

## 2018-06-20 RX ADMIN — PANTOPRAZOLE SODIUM 40 MILLIGRAM(S): 20 TABLET, DELAYED RELEASE ORAL at 12:06

## 2018-06-20 RX ADMIN — Medication 85 MILLIMOLE(S): at 12:06

## 2018-06-20 RX ADMIN — FENTANYL CITRATE 25 MICROGRAM(S): 50 INJECTION INTRAVENOUS at 10:00

## 2018-06-20 RX ADMIN — Medication 1000 MILLIGRAM(S): at 19:33

## 2018-06-20 RX ADMIN — FENTANYL CITRATE 12.5 MICROGRAM(S): 50 INJECTION INTRAVENOUS at 20:40

## 2018-06-20 RX ADMIN — Medication 30 MILLIGRAM(S): at 17:07

## 2018-06-20 RX ADMIN — FENTANYL CITRATE 25 MICROGRAM(S): 50 INJECTION INTRAVENOUS at 14:41

## 2018-06-20 RX ADMIN — FENTANYL CITRATE 25 MICROGRAM(S): 50 INJECTION INTRAVENOUS at 11:00

## 2018-06-20 RX ADMIN — Medication 400 MILLIGRAM(S): at 18:35

## 2018-06-20 RX ADMIN — FENTANYL CITRATE 12.5 MICROGRAM(S): 50 INJECTION INTRAVENOUS at 21:40

## 2018-06-20 RX ADMIN — HEPARIN SODIUM 5000 UNIT(S): 5000 INJECTION INTRAVENOUS; SUBCUTANEOUS at 14:42

## 2018-06-20 RX ADMIN — Medication 30 MILLIGRAM(S): at 18:00

## 2018-06-20 RX ADMIN — HEPARIN SODIUM 5000 UNIT(S): 5000 INJECTION INTRAVENOUS; SUBCUTANEOUS at 22:27

## 2018-06-20 RX ADMIN — HEPARIN SODIUM 5000 UNIT(S): 5000 INJECTION INTRAVENOUS; SUBCUTANEOUS at 05:33

## 2018-06-20 RX ADMIN — Medication 75 MICROGRAM(S): at 22:28

## 2018-06-20 NOTE — PROGRESS NOTE ADULT - ASSESSMENT
58M hx duodenitis/gastritis, gallstones, admitted with acute pancreatitis, suspect possible gallstone pancreatitis. transferred to SICU for closer monitoring.     NEURO: fentanyl PRN pain,.   CV: normotensive, tachycardiac on IVF @ 250 cc / hr, can decrease fluids today   PULM: nasal canula PRN   GI/FEN: NPO  / NGT to LIWS. zofran PRN. protonix, MRCP pending   : OXANA luciano  ENDO: ISS, synthroid.   ID: no abx   DVT PPX: SQH / SCDs    LINES: PIV. 58M hx duodenitis/gastritis, gallstones, admitted with acute pancreatitis, suspect possible gallstone pancreatitis. transferred to SICU for closer monitoring.     NEURO: fentanyl PRN pain,.   CV: normotensive, less tachycardiac on decreased  IVF @ 150 cc / hr,  PULM: nasal canula PRN   GI/FEN: NPO  / NGT to LIWS. zofran PRN. protonix, MRCP pending   : luciano, OXANA  ENDO: ISS, synthroid.   ID: no abx   DVT PPX: SQH / SCDs    LINES: PIV.PAIN: toradol/tylenol

## 2018-06-20 NOTE — PROGRESS NOTE ADULT - SUBJECTIVE AND OBJECTIVE BOX
Pt seen and examined. Had NG tube inserted with bilious fluid drained. States pain slightly worse today, not much relief with abdominal distension.         MEDICATIONS:  MEDICATIONS  (STANDING):  heparin  Injectable 5000 Unit(s) SubCutaneous every 8 hours  insulin lispro (HumaLOG) corrective regimen sliding scale   SubCutaneous every 6 hours  levothyroxine Injectable 75 MICROGram(s) IV Push at bedtime  pantoprazole  Injectable 40 milliGRAM(s) IV Push daily  sodium chloride 0.9%. 1000 milliLiter(s) (150 mL/Hr) IV Continuous <Continuous>  sodium phosphate IVPB 30 milliMole(s) IV Intermittent once    MEDICATIONS  (PRN):  fentaNYL    Injectable 12.5 MICROGram(s) IV Push every 2 hours PRN Moderate Pain (4 - 6)  fentaNYL    Injectable 25 MICROGram(s) IV Push every 2 hours PRN Severe Pain (7 - 10)  ondansetron Injectable 4 milliGRAM(s) IV Push every 6 hours PRN Nausea      Allergies    No Known Allergies    Intolerances        Vital Signs Last 24 Hrs  T(C): 37.4 (20 Jun 2018 09:00), Max: 37.4 (19 Jun 2018 22:00)  T(F): 99.3 (20 Jun 2018 09:00), Max: 99.3 (19 Jun 2018 22:00)  HR: 74 (20 Jun 2018 10:00) (74 - 122)  BP: 158/99 (20 Jun 2018 10:00) (127/71 - 178/98)  BP(mean): 133 (20 Jun 2018 10:00) (97 - 151)  RR: 17 (20 Jun 2018 10:00) (14 - 31)  SpO2: 95% (20 Jun 2018 10:00) (91% - 98%)    06-19 @ 07:01  -  06-20 @ 07:00  --------------------------------------------------------  IN: 6000 mL / OUT: 3910 mL / NET: 2090 mL    06-20 @ 07:01  -  06-20 @ 11:08  --------------------------------------------------------  IN: 250 mL / OUT: 0 mL / NET: 250 mL        PHYSICAL EXAM:    General: Ill appearing,   HEENT: +NGT with bilious drainage  Gastrointestinal: grossly distended tympanic abdomen with mild diffuse tenderness but pt just receieved pain meds  Skin: Warm and dry. No obvious rash    LABS:      CBC Full  -  ( 20 Jun 2018 06:41 )  WBC Count : 6.4 K/uL  Hemoglobin : 15.5 g/dL  Hematocrit : 45.6 %  Platelet Count - Automated : 204 K/uL  Mean Cell Volume : 88.4 fL  Mean Cell Hemoglobin : 30.0 pg  Mean Cell Hemoglobin Concentration : 34.0 g/dL  Auto Neutrophil # : x  Auto Lymphocyte # : x  Auto Monocyte # : x  Auto Eosinophil # : x  Auto Basophil # : x  Auto Neutrophil % : x  Auto Lymphocyte % : x  Auto Monocyte % : x  Auto Eosinophil % : x  Auto Basophil % : x    06-20    132<L>  |  95<L>  |  29<H>  ----------------------------<  128<H>  5.0   |  24  |  0.89    Ca    8.2<L>      20 Jun 2018 06:41  Phos  1.8     06-20  Mg     3.3     06-20    TPro  6.8  /  Alb  3.1<L>  /  TBili  1.0  /  DBili  <0.2  /  AST  34  /  ALT  207<H>  /  AlkPhos  92  06-20    PT/INR - ( 18 Jun 2018 21:05 )   PT: 12.5 sec;   INR: 1.12          PTT - ( 18 Jun 2018 21:05 )  PTT:24.9 sec      Urinalysis Basic - ( 18 Jun 2018 21:50 )    Color: Yellow / Appearance: SL Cloudy / SG: <=1.005 / pH: x  Gluc: x / Ketone: NEGATIVE  / Bili: Negative / Urobili: 0.2 E.U./dL   Blood: x / Protein: 30 mg/dL / Nitrite: NEGATIVE   Leuk Esterase: NEGATIVE / RBC: < 5 /HPF / WBC < 5 /HPF   Sq Epi: x / Non Sq Epi: 0-5 /HPF / Bacteria: Present /HPF                RADIOLOGY & ADDITIONAL STUDIES (The following images were personally reviewed):

## 2018-06-20 NOTE — PROGRESS NOTE ADULT - ASSESSMENT
58M with hx hypothyroid on synthroid and recent EGD found to have gastritis/duodenitis presents with epigastric pain, nausea vomiting found to have elevated liver tests and pancreatic enzymes with radiographic evidence of gallstones, borderline dilated CBD up to 6.8mm with gradual tapering distally at the level of the ampulla and no choledocholithiasis seen, as well as moderate peripancreatic fat stranding c/w acute pancreatitis, likely gallstone pancreatitis with no current evidence of cholangitis and improving liver tests suspicious for passed stone, complicated by reactive ileus now s/p NGT placement with bilious output.  - NPO  - NGT   - serial abdominal exams  - LR @>200cc/hr  - pain mgmt  - MRCP as per surgery  - ERCP based on MRCP results  - ultimately cholecystectomy

## 2018-06-20 NOTE — PROGRESS NOTE ADULT - SUBJECTIVE AND OBJECTIVE BOX
ON: NGT placed, draining 1L immediately. Substantial output overnight. Suction malfunction vs. clog--after irrigation, immediate return of 500cc bilious fluid  6/19: NGT removed, IVF@250cc/hr. Pending MRCP.      ICU Vital Signs Last 24 Hrs  T(C): 37.4 (20 Jun 2018 09:00), Max: 37.4 (19 Jun 2018 22:00)  T(F): 99.3 (20 Jun 2018 09:00), Max: 99.3 (19 Jun 2018 22:00)  HR: 86 (20 Jun 2018 13:00) (74 - 122)  BP: 152/96 (20 Jun 2018 13:00) (127/71 - 178/98)  BP(mean): 122 (20 Jun 2018 13:00) (97 - 151)  ABP: --  ABP(mean): --  RR: 19 (20 Jun 2018 13:00) (14 - 31)  SpO2: 97% (20 Jun 2018 13:00) (91% - 98%)      I&O's Summary    19 Jun 2018 07:01  -  20 Jun 2018 07:00  --------------------------------------------------------  IN: 6000 mL / OUT: 3910 mL / NET: 2090 mL    20 Jun 2018 07:01  -  20 Jun 2018 13:05  --------------------------------------------------------  IN: 1000 mL / OUT: 1100 mL / NET: -100 mL          PHYSICAL EXAM:   Gen: NAD  Neurological: AAOx3, grossly intact   Cardiovascular: RRR, no murmurs or gallops   Respiratory: CTA bilaterally   Gastrointestinal: soft, mildly distended, ttp over epigastric region   Extremities: warm, no dependent edema  Vascular: no cyanosis/erythema   : luciano in place                          15.5   6.4   )-----------( 204      ( 20 Jun 2018 06:41 )             45.6       06-20    132<L>  |  95<L>  |  29<H>  ----------------------------<  128<H>  5.0   |  24  |  0.89    Ca    8.2<L>      20 Jun 2018 06:41  Phos  1.8     06-20  Mg     3.3     06-20    TPro  6.8  /  Alb  3.1<L>  /  TBili  1.0  /  DBili  <0.2  /  AST  34  /  ALT  207<H>  /  AlkPhos  92  06-20 ON: NGT placed, draining 1L immediately. Substantial output overnight. Suction malfunction vs. clog--after irrigation, immediate return of 500cc bilious fluid  6/19: NGT removed, IVF@250cc/hr. Pending MRCP.  No SOB or CP      ICU Vital Signs Last 24 Hrs  T(C): 37.4 (20 Jun 2018 09:00), Max: 37.4 (19 Jun 2018 22:00)  T(F): 99.3 (20 Jun 2018 09:00), Max: 99.3 (19 Jun 2018 22:00)  HR: 86 (20 Jun 2018 13:00) (74 - 122)  BP: 152/96 (20 Jun 2018 13:00) (127/71 - 178/98)  BP(mean): 122 (20 Jun 2018 13:00) (97 - 151)  ABP: --  ABP(mean): --  RR: 19 (20 Jun 2018 13:00) (14 - 31)  SpO2: 97% (20 Jun 2018 13:00) (91% - 98%)      I&O's Summary    19 Jun 2018 07:01  -  20 Jun 2018 07:00  --------------------------------------------------------  IN: 6000 mL / OUT: 3910 mL / NET: 2090 mL    20 Jun 2018 07:01  -  20 Jun 2018 13:05  --------------------------------------------------------  IN: 1000 mL / OUT: 1100 mL / NET: -100 mL          PHYSICAL EXAM:   Gen: NAD  Neurological: AAOx3, grossly intact   HEENT: PERRL, MMM  Cardiovascular: RRR, no murmurs or gallops   Respiratory: CTA bilaterally   Gastrointestinal: soft, mildly distended, ttp over epigastric region   Extremities: warm, no dependent edema  Vascular: no cyanosis/erythema 2+ radial and DP  : luciano in place  skin: no rash  Psych: affect appropriate                          15.5   6.4   )-----------( 204      ( 20 Jun 2018 06:41 )             45.6       06-20    132<L>  |  95<L>  |  29<H>  ----------------------------<  128<H>  5.0   |  24  |  0.89    Ca    8.2<L>      20 Jun 2018 06:41  Phos  1.8     06-20  Mg     3.3     06-20    TPro  6.8  /  Alb  3.1<L>  /  TBili  1.0  /  DBili  <0.2  /  AST  34  /  ALT  207<H>  /  AlkPhos  92  06-20

## 2018-06-20 NOTE — DIETITIAN INITIAL EVALUATION ADULT. - OTHER INFO
59 y/o male admitted with acute pancreatitis/abdominal distention and pain.NGT for drainage(noted 1500ml  reported)>Noted report of N/V and pain.Skin intact. No diarrhea.Noted GI history of gastritis..

## 2018-06-21 LAB
ALBUMIN SERPL ELPH-MCNC: 2.7 G/DL — LOW (ref 3.3–5)
ALP SERPL-CCNC: 71 U/L — SIGNIFICANT CHANGE UP (ref 40–120)
ALT FLD-CCNC: 130 U/L — HIGH (ref 10–45)
AMYLASE P1 CFR SERPL: 53 U/L — SIGNIFICANT CHANGE UP (ref 25–125)
ANION GAP SERPL CALC-SCNC: 11 MMOL/L — SIGNIFICANT CHANGE UP (ref 5–17)
AST SERPL-CCNC: 18 U/L — SIGNIFICANT CHANGE UP (ref 10–40)
BILIRUB SERPL-MCNC: 0.6 MG/DL — SIGNIFICANT CHANGE UP (ref 0.2–1.2)
BUN SERPL-MCNC: 31 MG/DL — HIGH (ref 7–23)
CALCIUM SERPL-MCNC: 8.1 MG/DL — LOW (ref 8.4–10.5)
CHLORIDE SERPL-SCNC: 97 MMOL/L — SIGNIFICANT CHANGE UP (ref 96–108)
CO2 SERPL-SCNC: 26 MMOL/L — SIGNIFICANT CHANGE UP (ref 22–31)
CREAT SERPL-MCNC: 1.02 MG/DL — SIGNIFICANT CHANGE UP (ref 0.5–1.3)
GLUCOSE BLDC GLUCOMTR-MCNC: 110 MG/DL — HIGH (ref 70–99)
GLUCOSE BLDC GLUCOMTR-MCNC: 75 MG/DL — SIGNIFICANT CHANGE UP (ref 70–99)
GLUCOSE BLDC GLUCOMTR-MCNC: 77 MG/DL — SIGNIFICANT CHANGE UP (ref 70–99)
GLUCOSE BLDC GLUCOMTR-MCNC: 78 MG/DL — SIGNIFICANT CHANGE UP (ref 70–99)
GLUCOSE BLDC GLUCOMTR-MCNC: 80 MG/DL — SIGNIFICANT CHANGE UP (ref 70–99)
GLUCOSE BLDC GLUCOMTR-MCNC: 84 MG/DL — SIGNIFICANT CHANGE UP (ref 70–99)
GLUCOSE BLDC GLUCOMTR-MCNC: 95 MG/DL — SIGNIFICANT CHANGE UP (ref 70–99)
GLUCOSE SERPL-MCNC: 95 MG/DL — SIGNIFICANT CHANGE UP (ref 70–99)
HCT VFR BLD CALC: 40.8 % — SIGNIFICANT CHANGE UP (ref 39–50)
HGB BLD-MCNC: 13.6 G/DL — SIGNIFICANT CHANGE UP (ref 13–17)
LIDOCAIN IGE QN: 24 U/L — SIGNIFICANT CHANGE UP (ref 7–60)
MAGNESIUM SERPL-MCNC: 3.1 MG/DL — HIGH (ref 1.6–2.6)
MCHC RBC-ENTMCNC: 29.2 PG — SIGNIFICANT CHANGE UP (ref 27–34)
MCHC RBC-ENTMCNC: 33.3 G/DL — SIGNIFICANT CHANGE UP (ref 32–36)
MCV RBC AUTO: 87.6 FL — SIGNIFICANT CHANGE UP (ref 80–100)
PHOSPHATE SERPL-MCNC: 2.8 MG/DL — SIGNIFICANT CHANGE UP (ref 2.5–4.5)
PLATELET # BLD AUTO: 200 K/UL — SIGNIFICANT CHANGE UP (ref 150–400)
POTASSIUM SERPL-MCNC: 4.2 MMOL/L — SIGNIFICANT CHANGE UP (ref 3.5–5.3)
POTASSIUM SERPL-SCNC: 4.2 MMOL/L — SIGNIFICANT CHANGE UP (ref 3.5–5.3)
PROT SERPL-MCNC: 6.2 G/DL — SIGNIFICANT CHANGE UP (ref 6–8.3)
RBC # BLD: 4.66 M/UL — SIGNIFICANT CHANGE UP (ref 4.2–5.8)
RBC # FLD: 14 % — SIGNIFICANT CHANGE UP (ref 10.3–16.9)
SODIUM SERPL-SCNC: 134 MMOL/L — LOW (ref 135–145)
WBC # BLD: 4.6 K/UL — SIGNIFICANT CHANGE UP (ref 3.8–10.5)
WBC # FLD AUTO: 4.6 K/UL — SIGNIFICANT CHANGE UP (ref 3.8–10.5)

## 2018-06-21 PROCEDURE — 99232 SBSQ HOSP IP/OBS MODERATE 35: CPT | Mod: GC

## 2018-06-21 PROCEDURE — 99233 SBSQ HOSP IP/OBS HIGH 50: CPT | Mod: GC

## 2018-06-21 PROCEDURE — 43264 ERCP REMOVE DUCT CALCULI: CPT | Mod: GC

## 2018-06-21 PROCEDURE — 74181 MRI ABDOMEN W/O CONTRAST: CPT | Mod: 26

## 2018-06-21 PROCEDURE — 71045 X-RAY EXAM CHEST 1 VIEW: CPT | Mod: 26

## 2018-06-21 PROCEDURE — 74328 X-RAY BILE DUCT ENDOSCOPY: CPT | Mod: 26,GC

## 2018-06-21 PROCEDURE — 43262 ENDO CHOLANGIOPANCREATOGRAPH: CPT | Mod: 59,GC

## 2018-06-21 RX ORDER — DEXTROSE 50 % IN WATER 50 %
25 SYRINGE (ML) INTRAVENOUS ONCE
Qty: 0 | Refills: 0 | Status: COMPLETED | OUTPATIENT
Start: 2018-06-21 | End: 2018-06-21

## 2018-06-21 RX ORDER — ACETAMINOPHEN 500 MG
1000 TABLET ORAL ONCE
Qty: 0 | Refills: 0 | Status: DISCONTINUED | OUTPATIENT
Start: 2018-06-21 | End: 2018-06-21

## 2018-06-21 RX ORDER — DEXTROSE 50 % IN WATER 50 %
12.5 SYRINGE (ML) INTRAVENOUS ONCE
Qty: 0 | Refills: 0 | Status: COMPLETED | OUTPATIENT
Start: 2018-06-21 | End: 2018-06-22

## 2018-06-21 RX ORDER — SODIUM CHLORIDE 9 MG/ML
1000 INJECTION, SOLUTION INTRAVENOUS
Qty: 0 | Refills: 0 | Status: DISCONTINUED | OUTPATIENT
Start: 2018-06-21 | End: 2018-06-27

## 2018-06-21 RX ORDER — INSULIN LISPRO 100/ML
VIAL (ML) SUBCUTANEOUS EVERY 6 HOURS
Qty: 0 | Refills: 0 | Status: DISCONTINUED | OUTPATIENT
Start: 2018-06-21 | End: 2018-07-04

## 2018-06-21 RX ORDER — ACETAMINOPHEN 500 MG
1000 TABLET ORAL ONCE
Qty: 0 | Refills: 0 | Status: COMPLETED | OUTPATIENT
Start: 2018-06-21 | End: 2018-06-21

## 2018-06-21 RX ORDER — HYDROMORPHONE HYDROCHLORIDE 2 MG/ML
0.5 INJECTION INTRAMUSCULAR; INTRAVENOUS; SUBCUTANEOUS EVERY 4 HOURS
Qty: 0 | Refills: 0 | Status: DISCONTINUED | OUTPATIENT
Start: 2018-06-21 | End: 2018-06-22

## 2018-06-21 RX ADMIN — Medication 15 MILLIGRAM(S): at 00:23

## 2018-06-21 RX ADMIN — HYDROMORPHONE HYDROCHLORIDE 0.5 MILLIGRAM(S): 2 INJECTION INTRAMUSCULAR; INTRAVENOUS; SUBCUTANEOUS at 23:12

## 2018-06-21 RX ADMIN — FENTANYL CITRATE 12.5 MICROGRAM(S): 50 INJECTION INTRAVENOUS at 01:30

## 2018-06-21 RX ADMIN — Medication 75 MICROGRAM(S): at 22:36

## 2018-06-21 RX ADMIN — Medication 15 MILLIGRAM(S): at 01:20

## 2018-06-21 RX ADMIN — HEPARIN SODIUM 5000 UNIT(S): 5000 INJECTION INTRAVENOUS; SUBCUTANEOUS at 06:13

## 2018-06-21 RX ADMIN — HYDROMORPHONE HYDROCHLORIDE 0.5 MILLIGRAM(S): 2 INJECTION INTRAMUSCULAR; INTRAVENOUS; SUBCUTANEOUS at 19:06

## 2018-06-21 RX ADMIN — Medication 15 MILLIGRAM(S): at 07:13

## 2018-06-21 RX ADMIN — Medication 1000 MILLIGRAM(S): at 01:20

## 2018-06-21 RX ADMIN — HYDROMORPHONE HYDROCHLORIDE 0.5 MILLIGRAM(S): 2 INJECTION INTRAMUSCULAR; INTRAVENOUS; SUBCUTANEOUS at 11:46

## 2018-06-21 RX ADMIN — HYDROMORPHONE HYDROCHLORIDE 0.5 MILLIGRAM(S): 2 INJECTION INTRAMUSCULAR; INTRAVENOUS; SUBCUTANEOUS at 23:27

## 2018-06-21 RX ADMIN — HYDROMORPHONE HYDROCHLORIDE 0.5 MILLIGRAM(S): 2 INJECTION INTRAMUSCULAR; INTRAVENOUS; SUBCUTANEOUS at 12:02

## 2018-06-21 RX ADMIN — Medication 25 GRAM(S): at 19:11

## 2018-06-21 RX ADMIN — FENTANYL CITRATE 12.5 MICROGRAM(S): 50 INJECTION INTRAVENOUS at 06:45

## 2018-06-21 RX ADMIN — FENTANYL CITRATE 12.5 MICROGRAM(S): 50 INJECTION INTRAVENOUS at 00:27

## 2018-06-21 RX ADMIN — FENTANYL CITRATE 12.5 MICROGRAM(S): 50 INJECTION INTRAVENOUS at 07:43

## 2018-06-21 RX ADMIN — Medication 400 MILLIGRAM(S): at 00:18

## 2018-06-21 RX ADMIN — ONDANSETRON 4 MILLIGRAM(S): 8 TABLET, FILM COATED ORAL at 10:33

## 2018-06-21 RX ADMIN — SODIUM CHLORIDE 150 MILLILITER(S): 9 INJECTION INTRAMUSCULAR; INTRAVENOUS; SUBCUTANEOUS at 20:28

## 2018-06-21 RX ADMIN — HEPARIN SODIUM 5000 UNIT(S): 5000 INJECTION INTRAVENOUS; SUBCUTANEOUS at 14:06

## 2018-06-21 RX ADMIN — ONDANSETRON 4 MILLIGRAM(S): 8 TABLET, FILM COATED ORAL at 19:11

## 2018-06-21 RX ADMIN — PANTOPRAZOLE SODIUM 40 MILLIGRAM(S): 20 TABLET, DELAYED RELEASE ORAL at 11:47

## 2018-06-21 RX ADMIN — Medication 1000 MILLIGRAM(S): at 14:56

## 2018-06-21 RX ADMIN — HYDROMORPHONE HYDROCHLORIDE 0.5 MILLIGRAM(S): 2 INJECTION INTRAMUSCULAR; INTRAVENOUS; SUBCUTANEOUS at 19:30

## 2018-06-21 RX ADMIN — Medication 400 MILLIGRAM(S): at 14:09

## 2018-06-21 RX ADMIN — CHLORHEXIDINE GLUCONATE 1 APPLICATION(S): 213 SOLUTION TOPICAL at 17:52

## 2018-06-21 RX ADMIN — Medication 15 MILLIGRAM(S): at 06:13

## 2018-06-21 RX ADMIN — HEPARIN SODIUM 5000 UNIT(S): 5000 INJECTION INTRAVENOUS; SUBCUTANEOUS at 22:37

## 2018-06-21 NOTE — PROGRESS NOTE ADULT - SUBJECTIVE AND OBJECTIVE BOX
Pt seen and examined. NG tube fell out and now reinserted, pt remains distended. MRCP done overnight      MEDICATIONS:  MEDICATIONS  (STANDING):  chlorhexidine 2% Cloths 1 Application(s) Topical daily  heparin  Injectable 5000 Unit(s) SubCutaneous every 8 hours  insulin lispro (HumaLOG) corrective regimen sliding scale.   SubCutaneous every 6 hours  levothyroxine Injectable 75 MICROGram(s) IV Push at bedtime  pantoprazole  Injectable 40 milliGRAM(s) IV Push daily  sodium chloride 0.9%. 1000 milliLiter(s) (150 mL/Hr) IV Continuous <Continuous>    MEDICATIONS  (PRN):  HYDROmorphone  Injectable 0.5 milliGRAM(s) IV Push every 4 hours PRN Severe Pain (7 - 10)  ondansetron Injectable 4 milliGRAM(s) IV Push every 6 hours PRN Nausea      Allergies    No Known Allergies    Intolerances        Vital Signs Last 24 Hrs  T(C): 37.1 (21 Jun 2018 12:00), Max: 37.1 (21 Jun 2018 12:00)  T(F): 98.7 (21 Jun 2018 12:00), Max: 98.7 (21 Jun 2018 12:00)  HR: 74 (21 Jun 2018 14:00) (58 - 100)  BP: 164/90 (21 Jun 2018 14:00) (105/67 - 172/82)  BP(mean): 119 (21 Jun 2018 14:00) (77 - 136)  RR: 15 (21 Jun 2018 14:00) (10 - 34)  SpO2: 96% (21 Jun 2018 14:00) (93% - 98%)    06-20 @ 07:01  -  06-21 @ 07:00  --------------------------------------------------------  IN: 2200 mL / OUT: 4770 mL / NET: -2570 mL    06-21 @ 07:01  -  06-21 @ 16:00  --------------------------------------------------------  IN: 750 mL / OUT: 465 mL / NET: 285 mL        PHYSICAL EXAM:    General: NAD  HEENT: +NGT with bilious fluid  Gastrointestinal: Soft grossly distended tender epigastrium   Skin: Warm and dry. No obvious rash    LABS:      CBC Full  -  ( 21 Jun 2018 05:50 )  WBC Count : 4.6 K/uL  Hemoglobin : 13.6 g/dL  Hematocrit : 40.8 %  Platelet Count - Automated : 200 K/uL  Mean Cell Volume : 87.6 fL  Mean Cell Hemoglobin : 29.2 pg  Mean Cell Hemoglobin Concentration : 33.3 g/dL  Auto Neutrophil # : x  Auto Lymphocyte # : x  Auto Monocyte # : x  Auto Eosinophil # : x  Auto Basophil # : x  Auto Neutrophil % : x  Auto Lymphocyte % : x  Auto Monocyte % : x  Auto Eosinophil % : x  Auto Basophil % : x    06-21    134<L>  |  97  |  31<H>  ----------------------------<  95  4.2   |  26  |  1.02    Ca    8.1<L>      21 Jun 2018 05:50  Phos  2.8     06-21  Mg     3.1     06-21    TPro  6.2  /  Alb  2.7<L>  /  TBili  0.6  /  DBili  x   /  AST  18  /  ALT  130<H>  /  AlkPhos  71  06-21                      RADIOLOGY & ADDITIONAL STUDIES (The following images were personally reviewed):

## 2018-06-21 NOTE — PROGRESS NOTE ADULT - SUBJECTIVE AND OBJECTIVE BOX
ON: MRCP performed  6/20: MRCP pending, toradol x1, NGT 2.1 L output, +BM        ICU Vital Signs Last 24 Hrs  T(C): 37.1 (21 Jun 2018 12:00), Max: 37.1 (21 Jun 2018 12:00)  T(F): 98.7 (21 Jun 2018 12:00), Max: 98.7 (21 Jun 2018 12:00)  HR: 72 (21 Jun 2018 12:00) (58 - 100)  BP: 152/81 (21 Jun 2018 12:00) (105/67 - 172/82)  BP(mean): 93 (21 Jun 2018 12:00) (77 - 136)  ABP: --  ABP(mean): --  RR: 18 (21 Jun 2018 12:00) (10 - 34)  SpO2: 95% (21 Jun 2018 12:00) (93% - 98%)      I&O's Summary    20 Jun 2018 07:01  -  21 Jun 2018 07:00  --------------------------------------------------------  IN: 2200 mL / OUT: 4770 mL / NET: -2570 mL    21 Jun 2018 07:01  -  21 Jun 2018 13:02  --------------------------------------------------------  IN: 750 mL / OUT: 345 mL / NET: 405 mL          PHYSICAL EXAM:   Gen: NAD  Neurological: AAOx3, grossly intact   HEENT: PERRL, MMM  Cardiovascular: RRR, no murmurs or gallops   Respiratory: CTA bilaterally   Gastrointestinal: soft, mildly distended but improved, mildly tender over epigastric region   Extremities: warm, no dependent edema  : ankita in place  skin: no rash                              13.6   4.6   )-----------( 200      ( 21 Jun 2018 05:50 )             40.8 ON: MRCP performed  6/20: MRCP pending, toradol x1, NGT 2.1 L output, +BM  Abd pain moderately controlled. No SOB        ICU Vital Signs Last 24 Hrs  T(C): 37.1 (21 Jun 2018 12:00), Max: 37.1 (21 Jun 2018 12:00)  T(F): 98.7 (21 Jun 2018 12:00), Max: 98.7 (21 Jun 2018 12:00)  HR: 72 (21 Jun 2018 12:00) (58 - 100)  BP: 152/81 (21 Jun 2018 12:00) (105/67 - 172/82)  BP(mean): 93 (21 Jun 2018 12:00) (77 - 136)  ABP: --  ABP(mean): --  RR: 18 (21 Jun 2018 12:00) (10 - 34)  SpO2: 95% (21 Jun 2018 12:00) (93% - 98%)      I&O's Summary    20 Jun 2018 07:01  -  21 Jun 2018 07:00  --------------------------------------------------------  IN: 2200 mL / OUT: 4770 mL / NET: -2570 mL    21 Jun 2018 07:01  -  21 Jun 2018 13:02  --------------------------------------------------------  IN: 750 mL / OUT: 345 mL / NET: 405 mL          PHYSICAL EXAM:   Gen: NAD  Neurological: AAOx3, grossly intact   HEENT: PERRL, MMM  Cardiovascular: RRR, no murmurs or gallops   Respiratory: CTA bilaterally   Gastrointestinal: soft, mildly distended but improved, mildly tender over epigastric region   Extremities: warm, no dependent edema  : luciano in place  Vasc: 2+ radial and DP pulses  MSK: no joint swelling  skin: no rash                              13.6   4.6   )-----------( 200      ( 21 Jun 2018 05:50 )             40.8

## 2018-06-21 NOTE — PROGRESS NOTE ADULT - ASSESSMENT
Impression: 58M with gallstone pancreatitis and choledocholithiasis.    Plan:  -NPO, NGT  -GI consult for ERCP given choledocholithiasis and persistent abdominal pain  -IV fluids

## 2018-06-21 NOTE — PROGRESS NOTE ADULT - SUBJECTIVE AND OBJECTIVE BOX
Patient seen and examined at bedside.    Continues to complain of abdominal pain.  Not significantly improved despite + flatus and BM.  NGT fell out while having BM per patient.    Abdomen distended but soft, mild epigastric tenderness.      Labs, MRCP reviewed.  MRCP shows cholelithiasis with choledocholithiasis.

## 2018-06-21 NOTE — PROGRESS NOTE ADULT - ASSESSMENT
58M with hx hypothyroid on synthroid and recent EGD found to have gastritis/duodenitis presents with epigastric pain, nausea vomiting found to have elevated liver tests and pancreatic enzymes with radiographic evidence of gallstones, borderline dilated CBD up to 6.8mm with gradual tapering distally at the level of the ampulla and no choledocholithiasis seen, as well as moderate peripancreatic fat stranding c/w acute pancreatitis, likely gallstone pancreatitis with no current evidence of cholangitis and improving liver tests now s/p MRI showing nonobstructing CBD stones  - NPO  - LR @>200cc/hr  - pain mgmt  - ERCP with stone extraction  - interval cholecystectomy

## 2018-06-21 NOTE — PROGRESS NOTE ADULT - ASSESSMENT
58M hx duodenitis/gastritis, gallstones, admitted with acute pancreatitis, suspect possible gallstone pancreatitis. transferred to SICU for closer monitoring.     NEURO: dilaudid PRN pain, tylenol  PRN  CV: normotensive   PULM: nasal canula,   GI/FEN: NPO  / NGT to LIWS. zofran PRN. protonix, NS@150  : voids, OXANA  ENDO: ISS, synthroid.   ID: no abx   DVT PPX: SQH / SCDs    LINES: PIV.   WOUNDS/DRAINS:    LINES: PIV 58M hx duodenitis/gastritis, gallstones, admitted with acute pancreatitis, suspect possible gallstone pancreatitis. transferred to SICU for closer monitoring.     NEURO: dilaudid PRN pain, tylenol  PRN  CV: normotensive   PULM: nasal canula,   GI/FEN: NPO  / NGT to LIWS. zofran PRN. protonix, NS@150. The MRCP with multiple gallsontes and likely partial CBD obstruction. ERCP planned for this afternoon.  : voids, OXANA stable  ENDO: ISS, synthroid.   ID: no abx   DVT PPX: SQH / SCDs    LINES: PIV.   WOUNDS/DRAINS:    LINES: PIV

## 2018-06-22 LAB
ALBUMIN SERPL ELPH-MCNC: 2.6 G/DL — LOW (ref 3.3–5)
ALP SERPL-CCNC: 63 U/L — SIGNIFICANT CHANGE UP (ref 40–120)
ALT FLD-CCNC: 86 U/L — HIGH (ref 10–45)
ANION GAP SERPL CALC-SCNC: 12 MMOL/L — SIGNIFICANT CHANGE UP (ref 5–17)
APTT BLD: 26 SEC — LOW (ref 27.5–37.4)
AST SERPL-CCNC: 16 U/L — SIGNIFICANT CHANGE UP (ref 10–40)
BILIRUB SERPL-MCNC: 0.5 MG/DL — SIGNIFICANT CHANGE UP (ref 0.2–1.2)
BUN SERPL-MCNC: 17 MG/DL — SIGNIFICANT CHANGE UP (ref 7–23)
CALCIUM SERPL-MCNC: 7.8 MG/DL — LOW (ref 8.4–10.5)
CHLORIDE SERPL-SCNC: 98 MMOL/L — SIGNIFICANT CHANGE UP (ref 96–108)
CO2 SERPL-SCNC: 24 MMOL/L — SIGNIFICANT CHANGE UP (ref 22–31)
CREAT SERPL-MCNC: 0.76 MG/DL — SIGNIFICANT CHANGE UP (ref 0.5–1.3)
GLUCOSE BLDC GLUCOMTR-MCNC: 101 MG/DL — HIGH (ref 70–99)
GLUCOSE BLDC GLUCOMTR-MCNC: 102 MG/DL — HIGH (ref 70–99)
GLUCOSE BLDC GLUCOMTR-MCNC: 108 MG/DL — HIGH (ref 70–99)
GLUCOSE BLDC GLUCOMTR-MCNC: 125 MG/DL — HIGH (ref 70–99)
GLUCOSE SERPL-MCNC: 126 MG/DL — HIGH (ref 70–99)
HCT VFR BLD CALC: 37.9 % — LOW (ref 39–50)
HGB BLD-MCNC: 12.7 G/DL — LOW (ref 13–17)
INR BLD: 1.17 — HIGH (ref 0.88–1.16)
LIDOCAIN IGE QN: 19 U/L — SIGNIFICANT CHANGE UP (ref 7–60)
MAGNESIUM SERPL-MCNC: 2.4 MG/DL — SIGNIFICANT CHANGE UP (ref 1.6–2.6)
MCHC RBC-ENTMCNC: 29.5 PG — SIGNIFICANT CHANGE UP (ref 27–34)
MCHC RBC-ENTMCNC: 33.5 G/DL — SIGNIFICANT CHANGE UP (ref 32–36)
MCV RBC AUTO: 87.9 FL — SIGNIFICANT CHANGE UP (ref 80–100)
PHOSPHATE SERPL-MCNC: 1.7 MG/DL — LOW (ref 2.5–4.5)
PLATELET # BLD AUTO: 217 K/UL — SIGNIFICANT CHANGE UP (ref 150–400)
POTASSIUM SERPL-MCNC: 3.8 MMOL/L — SIGNIFICANT CHANGE UP (ref 3.5–5.3)
POTASSIUM SERPL-SCNC: 3.8 MMOL/L — SIGNIFICANT CHANGE UP (ref 3.5–5.3)
PROT SERPL-MCNC: 6 G/DL — SIGNIFICANT CHANGE UP (ref 6–8.3)
PROTHROM AB SERPL-ACNC: 13 SEC — HIGH (ref 9.8–12.7)
RBC # BLD: 4.31 M/UL — SIGNIFICANT CHANGE UP (ref 4.2–5.8)
RBC # FLD: 13.7 % — SIGNIFICANT CHANGE UP (ref 10.3–16.9)
SODIUM SERPL-SCNC: 134 MMOL/L — LOW (ref 135–145)
WBC # BLD: 6.4 K/UL — SIGNIFICANT CHANGE UP (ref 3.8–10.5)
WBC # FLD AUTO: 6.4 K/UL — SIGNIFICANT CHANGE UP (ref 3.8–10.5)

## 2018-06-22 PROCEDURE — 71045 X-RAY EXAM CHEST 1 VIEW: CPT | Mod: 26

## 2018-06-22 PROCEDURE — 99232 SBSQ HOSP IP/OBS MODERATE 35: CPT | Mod: GC

## 2018-06-22 PROCEDURE — 99233 SBSQ HOSP IP/OBS HIGH 50: CPT | Mod: GC

## 2018-06-22 RX ORDER — ACETAMINOPHEN 500 MG
1000 TABLET ORAL ONCE
Qty: 0 | Refills: 0 | Status: COMPLETED | OUTPATIENT
Start: 2018-06-22 | End: 2018-06-25

## 2018-06-22 RX ORDER — HYDROMORPHONE HYDROCHLORIDE 2 MG/ML
0.5 INJECTION INTRAMUSCULAR; INTRAVENOUS; SUBCUTANEOUS ONCE
Qty: 0 | Refills: 0 | Status: DISCONTINUED | OUTPATIENT
Start: 2018-06-22 | End: 2018-06-22

## 2018-06-22 RX ORDER — HYDROMORPHONE HYDROCHLORIDE 2 MG/ML
0.5 INJECTION INTRAMUSCULAR; INTRAVENOUS; SUBCUTANEOUS
Qty: 0 | Refills: 0 | Status: DISCONTINUED | OUTPATIENT
Start: 2018-06-22 | End: 2018-06-29

## 2018-06-22 RX ORDER — POTASSIUM PHOSPHATE, MONOBASIC POTASSIUM PHOSPHATE, DIBASIC 236; 224 MG/ML; MG/ML
30 INJECTION, SOLUTION INTRAVENOUS ONCE
Qty: 0 | Refills: 0 | Status: COMPLETED | OUTPATIENT
Start: 2018-06-22 | End: 2018-06-22

## 2018-06-22 RX ADMIN — HYDROMORPHONE HYDROCHLORIDE 0.5 MILLIGRAM(S): 2 INJECTION INTRAMUSCULAR; INTRAVENOUS; SUBCUTANEOUS at 04:09

## 2018-06-22 RX ADMIN — HYDROMORPHONE HYDROCHLORIDE 0.5 MILLIGRAM(S): 2 INJECTION INTRAMUSCULAR; INTRAVENOUS; SUBCUTANEOUS at 14:55

## 2018-06-22 RX ADMIN — HYDROMORPHONE HYDROCHLORIDE 0.5 MILLIGRAM(S): 2 INJECTION INTRAMUSCULAR; INTRAVENOUS; SUBCUTANEOUS at 10:24

## 2018-06-22 RX ADMIN — HYDROMORPHONE HYDROCHLORIDE 0.5 MILLIGRAM(S): 2 INJECTION INTRAMUSCULAR; INTRAVENOUS; SUBCUTANEOUS at 18:25

## 2018-06-22 RX ADMIN — HEPARIN SODIUM 5000 UNIT(S): 5000 INJECTION INTRAVENOUS; SUBCUTANEOUS at 21:23

## 2018-06-22 RX ADMIN — HYDROMORPHONE HYDROCHLORIDE 0.5 MILLIGRAM(S): 2 INJECTION INTRAMUSCULAR; INTRAVENOUS; SUBCUTANEOUS at 11:22

## 2018-06-22 RX ADMIN — HEPARIN SODIUM 5000 UNIT(S): 5000 INJECTION INTRAVENOUS; SUBCUTANEOUS at 14:55

## 2018-06-22 RX ADMIN — HYDROMORPHONE HYDROCHLORIDE 0.5 MILLIGRAM(S): 2 INJECTION INTRAMUSCULAR; INTRAVENOUS; SUBCUTANEOUS at 04:39

## 2018-06-22 RX ADMIN — Medication 75 MICROGRAM(S): at 22:44

## 2018-06-22 RX ADMIN — SODIUM CHLORIDE 150 MILLILITER(S): 9 INJECTION, SOLUTION INTRAVENOUS at 13:31

## 2018-06-22 RX ADMIN — SODIUM CHLORIDE 150 MILLILITER(S): 9 INJECTION, SOLUTION INTRAVENOUS at 00:04

## 2018-06-22 RX ADMIN — POTASSIUM PHOSPHATE, MONOBASIC POTASSIUM PHOSPHATE, DIBASIC 85 MILLIMOLE(S): 236; 224 INJECTION, SOLUTION INTRAVENOUS at 08:27

## 2018-06-22 RX ADMIN — HYDROMORPHONE HYDROCHLORIDE 0.5 MILLIGRAM(S): 2 INJECTION INTRAMUSCULAR; INTRAVENOUS; SUBCUTANEOUS at 10:52

## 2018-06-22 RX ADMIN — HYDROMORPHONE HYDROCHLORIDE 0.5 MILLIGRAM(S): 2 INJECTION INTRAMUSCULAR; INTRAVENOUS; SUBCUTANEOUS at 21:52

## 2018-06-22 RX ADMIN — HYDROMORPHONE HYDROCHLORIDE 0.5 MILLIGRAM(S): 2 INJECTION INTRAMUSCULAR; INTRAVENOUS; SUBCUTANEOUS at 21:22

## 2018-06-22 RX ADMIN — SODIUM CHLORIDE 150 MILLILITER(S): 9 INJECTION, SOLUTION INTRAVENOUS at 22:45

## 2018-06-22 RX ADMIN — HYDROMORPHONE HYDROCHLORIDE 0.5 MILLIGRAM(S): 2 INJECTION INTRAMUSCULAR; INTRAVENOUS; SUBCUTANEOUS at 10:21

## 2018-06-22 RX ADMIN — HYDROMORPHONE HYDROCHLORIDE 0.5 MILLIGRAM(S): 2 INJECTION INTRAMUSCULAR; INTRAVENOUS; SUBCUTANEOUS at 18:20

## 2018-06-22 RX ADMIN — CHLORHEXIDINE GLUCONATE 1 APPLICATION(S): 213 SOLUTION TOPICAL at 13:30

## 2018-06-22 RX ADMIN — Medication 12.5 GRAM(S): at 00:03

## 2018-06-22 RX ADMIN — PANTOPRAZOLE SODIUM 40 MILLIGRAM(S): 20 TABLET, DELAYED RELEASE ORAL at 12:21

## 2018-06-22 RX ADMIN — HYDROMORPHONE HYDROCHLORIDE 0.5 MILLIGRAM(S): 2 INJECTION INTRAMUSCULAR; INTRAVENOUS; SUBCUTANEOUS at 15:25

## 2018-06-22 RX ADMIN — HEPARIN SODIUM 5000 UNIT(S): 5000 INJECTION INTRAVENOUS; SUBCUTANEOUS at 06:22

## 2018-06-22 NOTE — PROGRESS NOTE ADULT - SUBJECTIVE AND OBJECTIVE BOX
Pt seen and examined. Having more pain today. NG tube replaced this morning with 1200mL bilious output. Passing flatus and 2 BM overnight.         MEDICATIONS:  MEDICATIONS  (STANDING):  chlorhexidine 2% Cloths 1 Application(s) Topical daily  dextrose 5% + sodium chloride 0.9%. 1000 milliLiter(s) (150 mL/Hr) IV Continuous <Continuous>  heparin  Injectable 5000 Unit(s) SubCutaneous every 8 hours  insulin lispro (HumaLOG) corrective regimen sliding scale.   SubCutaneous every 6 hours  levothyroxine Injectable 75 MICROGram(s) IV Push at bedtime  pantoprazole  Injectable 40 milliGRAM(s) IV Push daily    MEDICATIONS  (PRN):  HYDROmorphone  Injectable 0.5 milliGRAM(s) IV Push every 4 hours PRN Severe Pain (7 - 10)      Allergies    No Known Allergies    Intolerances        Vital Signs Last 24 Hrs  T(C): 37.5 (22 Jun 2018 06:04), Max: 37.5 (22 Jun 2018 06:04)  T(F): 99.5 (22 Jun 2018 06:04), Max: 99.5 (22 Jun 2018 06:04)  HR: 88 (22 Jun 2018 08:00) (72 - 96)  BP: 166/86 (22 Jun 2018 08:00) (124/73 - 172/92)  BP(mean): 119 (22 Jun 2018 08:00) (80 - 128)  RR: 19 (22 Jun 2018 08:00) (11 - 24)  SpO2: 90% (22 Jun 2018 08:00) (90% - 96%)    06-21 @ 07:01  -  06-22 @ 07:00  --------------------------------------------------------  IN: 2550 mL / OUT: 2925 mL / NET: -375 mL        PHYSICAL EXAM:    General: Well developed; well nourished; in no acute distress +NGT  HEENT: MMM, conjunctiva and sclera clear  Gastrointestinal: Grossly distended TTP lower quadrants   Skin: Warm and dry. No obvious rash    LABS:      CBC Full  -  ( 22 Jun 2018 05:17 )  WBC Count : 6.4 K/uL  Hemoglobin : 12.7 g/dL  Hematocrit : 37.9 %  Platelet Count - Automated : 217 K/uL  Mean Cell Volume : 87.9 fL  Mean Cell Hemoglobin : 29.5 pg  Mean Cell Hemoglobin Concentration : 33.5 g/dL  Auto Neutrophil # : x  Auto Lymphocyte # : x  Auto Monocyte # : x  Auto Eosinophil # : x  Auto Basophil # : x  Auto Neutrophil % : x  Auto Lymphocyte % : x  Auto Monocyte % : x  Auto Eosinophil % : x  Auto Basophil % : x    06-22    134<L>  |  98  |  17  ----------------------------<  126<H>  3.8   |  24  |  0.76    Ca    7.8<L>      22 Jun 2018 05:17  Phos  1.7     06-22  Mg     2.4     06-22    TPro  6.0  /  Alb  2.6<L>  /  TBili  0.5  /  DBili  x   /  AST  16  /  ALT  86<H>  /  AlkPhos  63  06-22    PT/INR - ( 22 Jun 2018 05:17 )   PT: 13.0 sec;   INR: 1.17          PTT - ( 22 Jun 2018 05:17 )  PTT:26.0 sec                  RADIOLOGY & ADDITIONAL STUDIES (The following images were personally reviewed):

## 2018-06-22 NOTE — PROGRESS NOTE ADULT - ASSESSMENT
58M with hx hypothyroid on synthroid and recent EGD found to have gastritis/duodenitis presents with epigastric pain, nausea vomiting found to have elevated liver tests and pancreatic enzymes with radiographic evidence of gallstones, borderline dilated CBD up to 6.8mm with gradual tapering distally at the level of the ampulla and no choledocholithiasis seen, as well as moderate peripancreatic fat stranding c/w acute pancreatitis, likely gallstone pancreatitis with no current evidence of cholangitis and improving liver tests now s/p MRI showing nonobstructing CBD stones and s/p ERCP 6/21 with stone removal and sphincterotomy.  - NPO  - continue fluid resuscitation; suggest LR, monitor for any early signs of abdominal compartment syndrome  - consider non-oral nutrition sources   - pain mgmt, non-opiates if possible  - monitor bilious drainage, monitor and correct electrolytes  - encourage sitting/ambulation  - cholecystectomy

## 2018-06-22 NOTE — PROGRESS NOTE ADULT - ASSESSMENT
58M hx duodenitis/gastritis, gallstones, admitted with acute pancreatitis, suspect possible gallstone pancreatitis now s/p ERCP w/ sphincterotomy     NEURO: dilaudid PRN pain, tylenol  PRN  CV: normotensive   PULM: nasal canula,   GI/FEN: NPO,  zofran PRN. protonix, NS@150  : voids, OXANA  ENDO: ISS, synthroid.   ID: no abx   DVT PPX: SQH / SCDs    LINES: PIV.   WOUNDS/DRAINS:    LINES: PIV 58M hx duodenitis/gastritis, gallstones, admitted with acute pancreatitis, suspect possible gallstone pancreatitis now s/p ERCP w/ sphincterotomy     NEURO: dilaudid PRN pain, tylenol  PRN  CV: normotensive, sepsis secondary to pancreatitis POA now resolved   PULM: nasal canula,   GI/FEN: NPO,  zofran PRN. protonix, NS@150  : voids, OXANA  ENDO: ISS, synthroid.   ID: no abx   DVT PPX: SQH / SCDs    LINES: PIV.   WOUNDS/DRAINS:    LINES: PIV 58M hx duodenitis/gastritis, gallstones, admitted with acute pancreatitis, suspect possible gallstone pancreatitis now s/p ERCP w/ sphincterotomy     NEURO: dilaudid PRN pain, tylenol  PRN  CV: normotensive, sepsis secondary to pancreatitis POA now resolved   PULM: nasal canula,   GI/FEN: NPO,  zofran PRN. protonix, D5NS@150  : voids, OXANA  ENDO: ISS, synthroid.   ID: no abx   DVT PPX: SQH / SCDs    LINES: PIV.   WOUNDS/DRAINS:    LINES: PIV

## 2018-06-22 NOTE — PROGRESS NOTE ADULT - SUBJECTIVE AND OBJECTIVE BOX
ON: [id number] given for FS 70s, again 70s at MN, given 1/2 amp. IVF switched to D5NS. +BMx1  6/21: MRCP: choledocholiathsis, toradol d/c'ed, luciano d/c'ed --> passed TOV, NGT replaced, ERCP --> stone removed and sphincterotomy, NGT removed during ERCP      ICU Vital Signs Last 24 Hrs  T(C): 37.5 (22 Jun 2018 06:04), Max: 37.5 (22 Jun 2018 06:04)  T(F): 99.5 (22 Jun 2018 06:04), Max: 99.5 (22 Jun 2018 06:04)  HR: 76 (22 Jun 2018 06:25) (58 - 96)  BP: 131/67 (22 Jun 2018 06:25) (124/73 - 166/98)  BP(mean): 100 (22 Jun 2018 06:25) (80 - 128)  ABP: --  ABP(mean): --  RR: 11 (22 Jun 2018 06:25) (11 - 24)  SpO2: 94% (22 Jun 2018 06:25) (92% - 96%)      I&O's Summary    21 Jun 2018 07:01  -  22 Jun 2018 07:00  --------------------------------------------------------  IN: 2550 mL / OUT: 2725 mL / NET: -175 mL        PHYSICAL EXAM:   Gen: NAD  Neurological: AAOx3, grossly intact   HEENT: PERRL, MMM  Cardiovascular: RRR, no murmurs or gallops   Respiratory: CTA bilaterally   Gastrointestinal: soft, very distended, non tender, no rebound or guarding    Extremities: warm, no dependent edema  : luciano in place  Vasc: 2+ radial and DP pulses  MSK: no joint swelling  skin: no rash                          12.7   6.4   )-----------( 217      ( 22 Jun 2018 05:17 )             37.9     06-22    134<L>  |  98  |  17  ----------------------------<  126<H>  3.8   |  24  |  0.76    Ca    7.8<L>      22 Jun 2018 05:17  Phos  1.7     06-22  Mg     2.4     06-22    TPro  6.0  /  Alb  2.6<L>  /  TBili  0.5  /  DBili  x   /  AST  16  /  ALT  86<H>  /  AlkPhos  63  06-22 ON: [id number] given for FS 70s, again 70s at MN, given 1/2 amp. IVF switched to D5NS. +BMx1  6/21: MRCP: choledocholiathsis, toradol d/c'ed, luciano d/c'ed --> passed TOV, NGT replaced, ERCP --> stone removed and sphincterotomy, NGT removed during ERCP  Continues to have abdominal pain, no SOB      ICU Vital Signs Last 24 Hrs  T(C): 37.5 (22 Jun 2018 06:04), Max: 37.5 (22 Jun 2018 06:04)  T(F): 99.5 (22 Jun 2018 06:04), Max: 99.5 (22 Jun 2018 06:04)  HR: 76 (22 Jun 2018 06:25) (58 - 96)  BP: 131/67 (22 Jun 2018 06:25) (124/73 - 166/98)  BP(mean): 100 (22 Jun 2018 06:25) (80 - 128)  ABP: --  ABP(mean): --  RR: 11 (22 Jun 2018 06:25) (11 - 24)  SpO2: 94% (22 Jun 2018 06:25) (92% - 96%)      I&O's Summary    21 Jun 2018 07:01  -  22 Jun 2018 07:00  --------------------------------------------------------  IN: 2550 mL / OUT: 2725 mL / NET: -175 mL        PHYSICAL EXAM:   Gen: NAD  Neurological: AAOx3, grossly intact   HEENT: PERRL, MMM  Cardiovascular: RRR, no murmurs or gallops   Respiratory: CTA bilaterally   Gastrointestinal: soft, very distended, non tender, no rebound or guarding    Extremities: warm, no dependent edema  : normal male  Vasc: 2+ radial and DP pulses  MSK: no joint swelling  skin: no rash                          12.7   6.4   )-----------( 217      ( 22 Jun 2018 05:17 )             37.9     06-22    134<L>  |  98  |  17  ----------------------------<  126<H>  3.8   |  24  |  0.76    Ca    7.8<L>      22 Jun 2018 05:17  Phos  1.7     06-22  Mg     2.4     06-22    TPro  6.0  /  Alb  2.6<L>  /  TBili  0.5  /  DBili  x   /  AST  16  /  ALT  86<H>  /  AlkPhos  63  06-22

## 2018-06-23 LAB
ALBUMIN SERPL ELPH-MCNC: 2.7 G/DL — LOW (ref 3.3–5)
ALP SERPL-CCNC: 63 U/L — SIGNIFICANT CHANGE UP (ref 40–120)
ALT FLD-CCNC: 66 U/L — HIGH (ref 10–45)
ANION GAP SERPL CALC-SCNC: 12 MMOL/L — SIGNIFICANT CHANGE UP (ref 5–17)
AST SERPL-CCNC: 18 U/L — SIGNIFICANT CHANGE UP (ref 10–40)
BILIRUB SERPL-MCNC: 0.4 MG/DL — SIGNIFICANT CHANGE UP (ref 0.2–1.2)
BUN SERPL-MCNC: 10 MG/DL — SIGNIFICANT CHANGE UP (ref 7–23)
CALCIUM SERPL-MCNC: 8.3 MG/DL — LOW (ref 8.4–10.5)
CHLORIDE SERPL-SCNC: 101 MMOL/L — SIGNIFICANT CHANGE UP (ref 96–108)
CO2 SERPL-SCNC: 26 MMOL/L — SIGNIFICANT CHANGE UP (ref 22–31)
CREAT SERPL-MCNC: 0.76 MG/DL — SIGNIFICANT CHANGE UP (ref 0.5–1.3)
CULTURE RESULTS: SIGNIFICANT CHANGE UP
CULTURE RESULTS: SIGNIFICANT CHANGE UP
GLUCOSE BLDC GLUCOMTR-MCNC: 103 MG/DL — HIGH (ref 70–99)
GLUCOSE BLDC GLUCOMTR-MCNC: 89 MG/DL — SIGNIFICANT CHANGE UP (ref 70–99)
GLUCOSE SERPL-MCNC: 128 MG/DL — HIGH (ref 70–99)
HCT VFR BLD CALC: 37.9 % — LOW (ref 39–50)
HGB BLD-MCNC: 12.8 G/DL — LOW (ref 13–17)
MAGNESIUM SERPL-MCNC: 2 MG/DL — SIGNIFICANT CHANGE UP (ref 1.6–2.6)
MCHC RBC-ENTMCNC: 29.9 PG — SIGNIFICANT CHANGE UP (ref 27–34)
MCHC RBC-ENTMCNC: 33.8 G/DL — SIGNIFICANT CHANGE UP (ref 32–36)
MCV RBC AUTO: 88.6 FL — SIGNIFICANT CHANGE UP (ref 80–100)
PHOSPHATE SERPL-MCNC: 2.4 MG/DL — LOW (ref 2.5–4.5)
PLATELET # BLD AUTO: 243 K/UL — SIGNIFICANT CHANGE UP (ref 150–400)
POTASSIUM SERPL-MCNC: 3.6 MMOL/L — SIGNIFICANT CHANGE UP (ref 3.5–5.3)
POTASSIUM SERPL-SCNC: 3.6 MMOL/L — SIGNIFICANT CHANGE UP (ref 3.5–5.3)
PROT SERPL-MCNC: 5.9 G/DL — LOW (ref 6–8.3)
RBC # BLD: 4.28 M/UL — SIGNIFICANT CHANGE UP (ref 4.2–5.8)
RBC # FLD: 14.3 % — SIGNIFICANT CHANGE UP (ref 10.3–16.9)
SODIUM SERPL-SCNC: 139 MMOL/L — SIGNIFICANT CHANGE UP (ref 135–145)
SPECIMEN SOURCE: SIGNIFICANT CHANGE UP
SPECIMEN SOURCE: SIGNIFICANT CHANGE UP
WBC # BLD: 8.7 K/UL — SIGNIFICANT CHANGE UP (ref 3.8–10.5)
WBC # FLD AUTO: 8.7 K/UL — SIGNIFICANT CHANGE UP (ref 3.8–10.5)

## 2018-06-23 PROCEDURE — 99232 SBSQ HOSP IP/OBS MODERATE 35: CPT | Mod: GC

## 2018-06-23 RX ORDER — POTASSIUM CHLORIDE 20 MEQ
10 PACKET (EA) ORAL
Qty: 0 | Refills: 0 | Status: COMPLETED | OUTPATIENT
Start: 2018-06-23 | End: 2018-06-23

## 2018-06-23 RX ORDER — POTASSIUM CHLORIDE 20 MEQ
10 PACKET (EA) ORAL
Qty: 0 | Refills: 0 | Status: DISCONTINUED | OUTPATIENT
Start: 2018-06-23 | End: 2018-06-23

## 2018-06-23 RX ADMIN — HYDROMORPHONE HYDROCHLORIDE 0.5 MILLIGRAM(S): 2 INJECTION INTRAMUSCULAR; INTRAVENOUS; SUBCUTANEOUS at 21:47

## 2018-06-23 RX ADMIN — PANTOPRAZOLE SODIUM 40 MILLIGRAM(S): 20 TABLET, DELAYED RELEASE ORAL at 15:15

## 2018-06-23 RX ADMIN — HYDROMORPHONE HYDROCHLORIDE 0.5 MILLIGRAM(S): 2 INJECTION INTRAMUSCULAR; INTRAVENOUS; SUBCUTANEOUS at 00:24

## 2018-06-23 RX ADMIN — HYDROMORPHONE HYDROCHLORIDE 0.5 MILLIGRAM(S): 2 INJECTION INTRAMUSCULAR; INTRAVENOUS; SUBCUTANEOUS at 06:26

## 2018-06-23 RX ADMIN — Medication 100 MILLIEQUIVALENT(S): at 11:58

## 2018-06-23 RX ADMIN — HYDROMORPHONE HYDROCHLORIDE 0.5 MILLIGRAM(S): 2 INJECTION INTRAMUSCULAR; INTRAVENOUS; SUBCUTANEOUS at 21:32

## 2018-06-23 RX ADMIN — HYDROMORPHONE HYDROCHLORIDE 0.5 MILLIGRAM(S): 2 INJECTION INTRAMUSCULAR; INTRAVENOUS; SUBCUTANEOUS at 18:15

## 2018-06-23 RX ADMIN — HEPARIN SODIUM 5000 UNIT(S): 5000 INJECTION INTRAVENOUS; SUBCUTANEOUS at 21:31

## 2018-06-23 RX ADMIN — HEPARIN SODIUM 5000 UNIT(S): 5000 INJECTION INTRAVENOUS; SUBCUTANEOUS at 15:15

## 2018-06-23 RX ADMIN — Medication 100 MILLIEQUIVALENT(S): at 10:31

## 2018-06-23 RX ADMIN — HYDROMORPHONE HYDROCHLORIDE 0.5 MILLIGRAM(S): 2 INJECTION INTRAMUSCULAR; INTRAVENOUS; SUBCUTANEOUS at 06:56

## 2018-06-23 RX ADMIN — CHLORHEXIDINE GLUCONATE 1 APPLICATION(S): 213 SOLUTION TOPICAL at 15:15

## 2018-06-23 RX ADMIN — SODIUM CHLORIDE 150 MILLILITER(S): 9 INJECTION, SOLUTION INTRAVENOUS at 15:20

## 2018-06-23 RX ADMIN — SODIUM CHLORIDE 150 MILLILITER(S): 9 INJECTION, SOLUTION INTRAVENOUS at 03:16

## 2018-06-23 RX ADMIN — HYDROMORPHONE HYDROCHLORIDE 0.5 MILLIGRAM(S): 2 INJECTION INTRAMUSCULAR; INTRAVENOUS; SUBCUTANEOUS at 00:54

## 2018-06-23 RX ADMIN — Medication 75 MICROGRAM(S): at 22:38

## 2018-06-23 RX ADMIN — HEPARIN SODIUM 5000 UNIT(S): 5000 INJECTION INTRAVENOUS; SUBCUTANEOUS at 06:26

## 2018-06-23 RX ADMIN — HYDROMORPHONE HYDROCHLORIDE 0.5 MILLIGRAM(S): 2 INJECTION INTRAMUSCULAR; INTRAVENOUS; SUBCUTANEOUS at 17:59

## 2018-06-23 NOTE — PROGRESS NOTE ADULT - SUBJECTIVE AND OBJECTIVE BOX
Patient seen and examined at bedside after transferring to  Ur.  Feeling better.  Passing flatus and +BMs.  Abdominal pain is improving.  Abdomen soft, slightly less distended today although remains tympanitic)    Labs reviewed.    58M with severe gallstone pancreatitis s/p ERCP.  -If NGT output decreases, may remove NGT and start clears slowly.  -OOB/ambulation  -Pain control

## 2018-06-23 NOTE — PROGRESS NOTE ADULT - ASSESSMENT
58M with hx hypothyroid on synthroid and recent EGD found to have gastritis/duodenitis presents with epigastric pain, nausea vomiting found to have elevated liver tests and pancreatic enzymes with radiographic evidence of gallstones, borderline dilated CBD up to 6.8mm with gradual tapering distally at the level of the ampulla and no choledocholithiasis seen, as well as moderate peripancreatic fat stranding c/w acute pancreatitis, likely gallstone pancreatitis with no current evidence of cholangitis and improving liver tests now s/p MRI showing nonobstructing CBD stones and s/p ERCP 6/21 with stone removal and sphincterotomy.     - NPO  - continue IVF   - pain mgmt, non-opiates if possible  - monitor bilious drainage, monitor and correct electrolytes -- pt instructed to abstain for excessive fluid intake as it obscures I/O's  - encourage sitting/ambulation  - Continue care as per surgery

## 2018-06-23 NOTE — PROGRESS NOTE ADULT - SUBJECTIVE AND OBJECTIVE BOX
Pt seen and examined at bedside. Denies n/v. Reports mulitple BM's. improved abd pain.     REVIEW OF SYSTEMS:  Constitutional: No fever, weight loss or fatigue  Cardiovascular: No chest pain, palpitations, dizziness or leg swelling  Gastrointestinal: No abdominal or epigastric pain. No nausea, vomiting or hematemesis; No diarrhea or constipation. No melena or hematochezia.  Skin: No itching, burning, rashes or lesions       MEDICATIONS:  MEDICATIONS  (STANDING):  chlorhexidine 2% Cloths 1 Application(s) Topical daily  dextrose 5% + sodium chloride 0.9%. 1000 milliLiter(s) (150 mL/Hr) IV Continuous <Continuous>  heparin  Injectable 5000 Unit(s) SubCutaneous every 8 hours  HYDROmorphone  Injectable 0.5 milliGRAM(s) IV Push every 3 hours  insulin lispro (HumaLOG) corrective regimen sliding scale.   SubCutaneous every 6 hours  levothyroxine Injectable 75 MICROGram(s) IV Push at bedtime  pantoprazole  Injectable 40 milliGRAM(s) IV Push daily    MEDICATIONS  (PRN):  acetaminophen  IVPB. 1000 milliGRAM(s) IV Intermittent once PRN Moderate Pain (4 - 6)      Allergies    No Known Allergies    Intolerances        Vital Signs Last 24 Hrs  T(C): 37.4 (23 Jun 2018 11:17), Max: 37.7 (23 Jun 2018 00:48)  T(F): 99.3 (23 Jun 2018 11:17), Max: 99.9 (23 Jun 2018 00:48)  HR: 73 (23 Jun 2018 11:17) (62 - 84)  BP: 167/84 (23 Jun 2018 11:17) (104/73 - 175/90)  BP(mean): 110 (23 Jun 2018 09:46) (74 - 121)  RR: 18 (23 Jun 2018 11:17) (10 - 31)  SpO2: 95% (23 Jun 2018 11:17) (92% - 99%)    06-22 @ 07:01  -  06-23 @ 07:00  --------------------------------------------------------  IN: 4210 mL / OUT: 4250 mL / NET: -40 mL    06-23 @ 07:01  -  06-23 @ 13:40  --------------------------------------------------------  IN: 850 mL / OUT: 550 mL / NET: 300 mL        PHYSICAL EXAM:    General: Well developed; well nourished; in no acute distress  HEENT: MMM, conjunctiva and sclera clear, NGT in placed draining bilious output  Gastrointestinal: Soft, distended, +tympanic  Skin: Warm and dry. No obvious rash    LABS:      CBC Full  -  ( 23 Jun 2018 05:31 )  WBC Count : 8.7 K/uL  Hemoglobin : 12.8 g/dL  Hematocrit : 37.9 %  Platelet Count - Automated : 243 K/uL  Mean Cell Volume : 88.6 fL  Mean Cell Hemoglobin : 29.9 pg  Mean Cell Hemoglobin Concentration : 33.8 g/dL    06-23    139  |  101  |  10  ----------------------------<  128<H>  3.6   |  26  |  0.76    Ca    8.3<L>      23 Jun 2018 05:31  Phos  2.4     06-23  Mg     2.0     06-23    TPro  5.9<L>  /  Alb  2.7<L>  /  TBili  0.4  /  DBili  x   /  AST  18  /  ALT  66<H>  /  AlkPhos  63  06-23    PT/INR - ( 22 Jun 2018 05:17 )   PT: 13.0 sec;   INR: 1.17          PTT - ( 22 Jun 2018 05:17 )  PTT:26.0 sec                  RADIOLOGY & ADDITIONAL STUDIES (The following images were personally reviewed):

## 2018-06-24 LAB
ALBUMIN SERPL ELPH-MCNC: 2.6 G/DL — LOW (ref 3.3–5)
ALP SERPL-CCNC: 66 U/L — SIGNIFICANT CHANGE UP (ref 40–120)
ALT FLD-CCNC: 60 U/L — HIGH (ref 10–45)
ANION GAP SERPL CALC-SCNC: 10 MMOL/L — SIGNIFICANT CHANGE UP (ref 5–17)
AST SERPL-CCNC: 28 U/L — SIGNIFICANT CHANGE UP (ref 10–40)
BILIRUB SERPL-MCNC: 0.5 MG/DL — SIGNIFICANT CHANGE UP (ref 0.2–1.2)
BUN SERPL-MCNC: 9 MG/DL — SIGNIFICANT CHANGE UP (ref 7–23)
CALCIUM SERPL-MCNC: 8.3 MG/DL — LOW (ref 8.4–10.5)
CHLORIDE SERPL-SCNC: 101 MMOL/L — SIGNIFICANT CHANGE UP (ref 96–108)
CO2 SERPL-SCNC: 27 MMOL/L — SIGNIFICANT CHANGE UP (ref 22–31)
CREAT SERPL-MCNC: 0.81 MG/DL — SIGNIFICANT CHANGE UP (ref 0.5–1.3)
GLUCOSE BLDC GLUCOMTR-MCNC: 101 MG/DL — HIGH (ref 70–99)
GLUCOSE BLDC GLUCOMTR-MCNC: 111 MG/DL — HIGH (ref 70–99)
GLUCOSE BLDC GLUCOMTR-MCNC: 96 MG/DL — SIGNIFICANT CHANGE UP (ref 70–99)
GLUCOSE BLDC GLUCOMTR-MCNC: 97 MG/DL — SIGNIFICANT CHANGE UP (ref 70–99)
GLUCOSE SERPL-MCNC: 125 MG/DL — HIGH (ref 70–99)
HCT VFR BLD CALC: 35.3 % — LOW (ref 39–50)
HGB BLD-MCNC: 11.7 G/DL — LOW (ref 13–17)
LIDOCAIN IGE QN: 31 U/L — SIGNIFICANT CHANGE UP (ref 7–60)
MAGNESIUM SERPL-MCNC: 1.8 MG/DL — SIGNIFICANT CHANGE UP (ref 1.6–2.6)
MCHC RBC-ENTMCNC: 29.2 PG — SIGNIFICANT CHANGE UP (ref 27–34)
MCHC RBC-ENTMCNC: 33.1 G/DL — SIGNIFICANT CHANGE UP (ref 32–36)
MCV RBC AUTO: 88 FL — SIGNIFICANT CHANGE UP (ref 80–100)
PHOSPHATE SERPL-MCNC: 2.7 MG/DL — SIGNIFICANT CHANGE UP (ref 2.5–4.5)
PLATELET # BLD AUTO: 262 K/UL — SIGNIFICANT CHANGE UP (ref 150–400)
POTASSIUM SERPL-MCNC: 3.4 MMOL/L — LOW (ref 3.5–5.3)
POTASSIUM SERPL-SCNC: 3.4 MMOL/L — LOW (ref 3.5–5.3)
PROT SERPL-MCNC: 5.8 G/DL — LOW (ref 6–8.3)
RBC # BLD: 4.01 M/UL — LOW (ref 4.2–5.8)
RBC # FLD: 14.4 % — SIGNIFICANT CHANGE UP (ref 10.3–16.9)
SODIUM SERPL-SCNC: 138 MMOL/L — SIGNIFICANT CHANGE UP (ref 135–145)
WBC # BLD: 11 K/UL — HIGH (ref 3.8–10.5)
WBC # FLD AUTO: 11 K/UL — HIGH (ref 3.8–10.5)

## 2018-06-24 PROCEDURE — 74018 RADEX ABDOMEN 1 VIEW: CPT | Mod: 26

## 2018-06-24 PROCEDURE — 99232 SBSQ HOSP IP/OBS MODERATE 35: CPT | Mod: GC

## 2018-06-24 RX ORDER — POTASSIUM PHOSPHATE, MONOBASIC POTASSIUM PHOSPHATE, DIBASIC 236; 224 MG/ML; MG/ML
15 INJECTION, SOLUTION INTRAVENOUS ONCE
Qty: 0 | Refills: 0 | Status: COMPLETED | OUTPATIENT
Start: 2018-06-24 | End: 2018-06-24

## 2018-06-24 RX ORDER — MAGNESIUM SULFATE 500 MG/ML
1 VIAL (ML) INJECTION ONCE
Qty: 0 | Refills: 0 | Status: COMPLETED | OUTPATIENT
Start: 2018-06-24 | End: 2018-06-24

## 2018-06-24 RX ORDER — POTASSIUM CHLORIDE 20 MEQ
10 PACKET (EA) ORAL
Qty: 0 | Refills: 0 | Status: COMPLETED | OUTPATIENT
Start: 2018-06-24 | End: 2018-06-24

## 2018-06-24 RX ADMIN — HYDROMORPHONE HYDROCHLORIDE 0.5 MILLIGRAM(S): 2 INJECTION INTRAMUSCULAR; INTRAVENOUS; SUBCUTANEOUS at 17:36

## 2018-06-24 RX ADMIN — CHLORHEXIDINE GLUCONATE 1 APPLICATION(S): 213 SOLUTION TOPICAL at 13:14

## 2018-06-24 RX ADMIN — HYDROMORPHONE HYDROCHLORIDE 0.5 MILLIGRAM(S): 2 INJECTION INTRAMUSCULAR; INTRAVENOUS; SUBCUTANEOUS at 00:23

## 2018-06-24 RX ADMIN — SODIUM CHLORIDE 150 MILLILITER(S): 9 INJECTION, SOLUTION INTRAVENOUS at 00:26

## 2018-06-24 RX ADMIN — HEPARIN SODIUM 5000 UNIT(S): 5000 INJECTION INTRAVENOUS; SUBCUTANEOUS at 05:43

## 2018-06-24 RX ADMIN — Medication 100 MILLIEQUIVALENT(S): at 13:13

## 2018-06-24 RX ADMIN — HYDROMORPHONE HYDROCHLORIDE 0.5 MILLIGRAM(S): 2 INJECTION INTRAMUSCULAR; INTRAVENOUS; SUBCUTANEOUS at 17:20

## 2018-06-24 RX ADMIN — Medication 100 MILLIEQUIVALENT(S): at 15:21

## 2018-06-24 RX ADMIN — HYDROMORPHONE HYDROCHLORIDE 0.5 MILLIGRAM(S): 2 INJECTION INTRAMUSCULAR; INTRAVENOUS; SUBCUTANEOUS at 12:39

## 2018-06-24 RX ADMIN — Medication 75 MICROGRAM(S): at 22:22

## 2018-06-24 RX ADMIN — HEPARIN SODIUM 5000 UNIT(S): 5000 INJECTION INTRAVENOUS; SUBCUTANEOUS at 21:32

## 2018-06-24 RX ADMIN — HYDROMORPHONE HYDROCHLORIDE 0.5 MILLIGRAM(S): 2 INJECTION INTRAMUSCULAR; INTRAVENOUS; SUBCUTANEOUS at 12:24

## 2018-06-24 RX ADMIN — HYDROMORPHONE HYDROCHLORIDE 0.5 MILLIGRAM(S): 2 INJECTION INTRAMUSCULAR; INTRAVENOUS; SUBCUTANEOUS at 05:47

## 2018-06-24 RX ADMIN — Medication 100 MILLIEQUIVALENT(S): at 17:10

## 2018-06-24 RX ADMIN — Medication 100 GRAM(S): at 11:45

## 2018-06-24 RX ADMIN — SODIUM CHLORIDE 150 MILLILITER(S): 9 INJECTION, SOLUTION INTRAVENOUS at 15:23

## 2018-06-24 RX ADMIN — HYDROMORPHONE HYDROCHLORIDE 0.5 MILLIGRAM(S): 2 INJECTION INTRAMUSCULAR; INTRAVENOUS; SUBCUTANEOUS at 22:21

## 2018-06-24 RX ADMIN — HYDROMORPHONE HYDROCHLORIDE 0.5 MILLIGRAM(S): 2 INJECTION INTRAMUSCULAR; INTRAVENOUS; SUBCUTANEOUS at 21:32

## 2018-06-24 RX ADMIN — POTASSIUM PHOSPHATE, MONOBASIC POTASSIUM PHOSPHATE, DIBASIC 62.5 MILLIMOLE(S): 236; 224 INJECTION, SOLUTION INTRAVENOUS at 15:21

## 2018-06-24 RX ADMIN — PANTOPRAZOLE SODIUM 40 MILLIGRAM(S): 20 TABLET, DELAYED RELEASE ORAL at 12:24

## 2018-06-24 RX ADMIN — HEPARIN SODIUM 5000 UNIT(S): 5000 INJECTION INTRAVENOUS; SUBCUTANEOUS at 14:18

## 2018-06-24 RX ADMIN — HYDROMORPHONE HYDROCHLORIDE 0.5 MILLIGRAM(S): 2 INJECTION INTRAMUSCULAR; INTRAVENOUS; SUBCUTANEOUS at 00:38

## 2018-06-24 NOTE — PROGRESS NOTE ADULT - ASSESSMENT
58M hx duodenitis/gastritis, gallstones, admitted with acute pancreatitis, suspect possible gallstone pancreatitis now s/p ERCP w/ sphincterotomy     NPO/IVF  NGT  DVT ppx  AM labs

## 2018-06-24 NOTE — PROGRESS NOTE ADULT - SUBJECTIVE AND OBJECTIVE BOX
Pt seen and examined at bedside this am. Pt in good spirits. Denies abd pain n/v. Having solid BMs. Continues to have bilious output via NGT.     REVIEW OF SYSTEMS:  Constitutional: No fever, weight loss or fatigue  Cardiovascular: No chest pain, palpitations, dizziness or leg swelling  Gastrointestinal: No abdominal or epigastric pain. No nausea, vomiting or hematemesis; No diarrhea or constipation. No melena or hematochezia.  Skin: No itching, burning, rashes or lesions       MEDICATIONS:  MEDICATIONS  (STANDING):  chlorhexidine 2% Cloths 1 Application(s) Topical daily  dextrose 5% + sodium chloride 0.9%. 1000 milliLiter(s) (150 mL/Hr) IV Continuous <Continuous>  heparin  Injectable 5000 Unit(s) SubCutaneous every 8 hours  insulin lispro (HumaLOG) corrective regimen sliding scale.   SubCutaneous every 6 hours  levothyroxine Injectable 75 MICROGram(s) IV Push at bedtime  pantoprazole  Injectable 40 milliGRAM(s) IV Push daily    MEDICATIONS  (PRN):  acetaminophen  IVPB. 1000 milliGRAM(s) IV Intermittent once PRN Moderate Pain (4 - 6)  HYDROmorphone  Injectable 0.5 milliGRAM(s) IV Push every 3 hours PRN Severe Pain (7 - 10)      Allergies    No Known Allergies    Intolerances        Vital Signs Last 24 Hrs  T(C): 37.6 (24 Jun 2018 08:45), Max: 37.8 (24 Jun 2018 05:54)  T(F): 99.6 (24 Jun 2018 08:45), Max: 100 (24 Jun 2018 05:54)  HR: 60 (24 Jun 2018 08:45) (60 - 73)  BP: 139/81 (24 Jun 2018 08:45) (139/81 - 167/84)  BP(mean): --  RR: 15 (24 Jun 2018 08:45) (15 - 18)  SpO2: 95% (24 Jun 2018 08:45) (94% - 96%)    06-23 @ 07:01  -  06-24 @ 07:00  --------------------------------------------------------  IN: 3950 mL / OUT: 3300 mL / NET: 650 mL        PHYSICAL EXAM:    General: Well developed; well nourished; in no acute distress  HEENT: anicteric, NGT in place with bilious output  Gastrointestinal: Soft non-tender minimally distended, tympanic, bs+    LABS:      CBC Full  -  ( 24 Jun 2018 05:45 )  WBC Count : 11.0 K/uL  Hemoglobin : 11.7 g/dL  Hematocrit : 35.3 %  Platelet Count - Automated : 262 K/uL  Mean Cell Volume : 88.0 fL  Mean Cell Hemoglobin : 29.2 pg  Mean Cell Hemoglobin Concentration : 33.1 g/dL  Auto Neutrophil # : x  Auto Lymphocyte # : x  Auto Monocyte # : x  Auto Eosinophil # : x  Auto Basophil # : x  Auto Neutrophil % : x  Auto Lymphocyte % : x  Auto Monocyte % : x  Auto Eosinophil % : x  Auto Basophil % : x    06-24    138  |  101  |  9   ----------------------------<  125<H>  3.4<L>   |  27  |  0.81    Ca    8.3<L>      24 Jun 2018 05:45  Phos  2.7     06-24  Mg     1.8     06-24    TPro  5.8<L>  /  Alb  2.6<L>  /  TBili  0.5  /  DBili  x   /  AST  28  /  ALT  60<H>  /  AlkPhos  66  06-24                      RADIOLOGY & ADDITIONAL STUDIES (The following images were personally reviewed):

## 2018-06-24 NOTE — PROGRESS NOTE ADULT - SUBJECTIVE AND OBJECTIVE BOX
SUBJECTIVE: No acute events overnight. NGT. Denies n.    Vital Signs Last 24 Hrs  T(C): 37.8 (24 Jun 2018 05:54), Max: 37.8 (24 Jun 2018 05:54)  T(F): 100 (24 Jun 2018 05:54), Max: 100 (24 Jun 2018 05:54)  HR: 63 (24 Jun 2018 05:54) (63 - 80)  BP: 142/76 (24 Jun 2018 05:54) (142/76 - 169/84)  BP(mean): 110 (23 Jun 2018 09:46) (110 - 110)  RR: 17 (24 Jun 2018 05:54) (16 - 22)  SpO2: 94% (24 Jun 2018 05:54) (94% - 96%)    General: NAD, resting comfortably in bed  Pulm: Nonlabored breathing, no respiratory distress  Abd: softly distended, NT      LABS:                        11.7   11.0  )-----------( 262      ( 24 Jun 2018 05:45 )             35.3     06-24    138  |  101  |  9   ----------------------------<  125<H>  3.4<L>   |  27  |  0.81    Ca    8.3<L>      24 Jun 2018 05:45  Phos  2.7     06-24  Mg     1.8     06-24    TPro  5.8<L>  /  Alb  2.6<L>  /  TBili  0.5  /  DBili  x   /  AST  28  /  ALT  60<H>  /  AlkPhos  66  06-24

## 2018-06-24 NOTE — PROGRESS NOTE ADULT - ASSESSMENT
58M with hx hypothyroid on synthroid and recent EGD found to have gastritis/duodenitis presents with epigastric pain, nausea vomiting found to have elevated liver tests and pancreatic enzymes with radiographic evidence of gallstones, borderline dilated CBD up to 6.8mm with gradual tapering distally at the level of the ampulla and no choledocholithiasis seen, as well as moderate peripancreatic fat stranding c/w acute pancreatitis, likely gallstone pancreatitis with no current evidence of cholangitis and improving liver tests now s/p MRI showing nonobstructing CBD stones and s/p ERCP 6/21 with stone removal and sphincterotomy.     - NPO  - continue IVF   - pain mgmt, non-opiates if possible  - monitor bilious drainage (continues to have approx 500 cc of output this am)  - Replete lytes PRN  - pt instructed to abstain for excessive fluid intake as it obscures I/O's  - encourage sitting/ambulation  - Continue care as per surgery     GI will follow     case d/w Dr. Horn

## 2018-06-25 LAB
ALBUMIN SERPL ELPH-MCNC: 2.9 G/DL — LOW (ref 3.3–5)
ALP SERPL-CCNC: 79 U/L — SIGNIFICANT CHANGE UP (ref 40–120)
ALT FLD-CCNC: 57 U/L — HIGH (ref 10–45)
ANION GAP SERPL CALC-SCNC: 11 MMOL/L — SIGNIFICANT CHANGE UP (ref 5–17)
AST SERPL-CCNC: 28 U/L — SIGNIFICANT CHANGE UP (ref 10–40)
BILIRUB SERPL-MCNC: 0.8 MG/DL — SIGNIFICANT CHANGE UP (ref 0.2–1.2)
BUN SERPL-MCNC: 8 MG/DL — SIGNIFICANT CHANGE UP (ref 7–23)
CALCIUM SERPL-MCNC: 8.6 MG/DL — SIGNIFICANT CHANGE UP (ref 8.4–10.5)
CHLORIDE SERPL-SCNC: 99 MMOL/L — SIGNIFICANT CHANGE UP (ref 96–108)
CO2 SERPL-SCNC: 30 MMOL/L — SIGNIFICANT CHANGE UP (ref 22–31)
CREAT SERPL-MCNC: 0.89 MG/DL — SIGNIFICANT CHANGE UP (ref 0.5–1.3)
GLUCOSE BLDC GLUCOMTR-MCNC: 101 MG/DL — HIGH (ref 70–99)
GLUCOSE BLDC GLUCOMTR-MCNC: 107 MG/DL — HIGH (ref 70–99)
GLUCOSE BLDC GLUCOMTR-MCNC: 94 MG/DL — SIGNIFICANT CHANGE UP (ref 70–99)
GLUCOSE BLDC GLUCOMTR-MCNC: 95 MG/DL — SIGNIFICANT CHANGE UP (ref 70–99)
GLUCOSE SERPL-MCNC: 115 MG/DL — HIGH (ref 70–99)
HCT VFR BLD CALC: 36.4 % — LOW (ref 39–50)
HGB BLD-MCNC: 12.4 G/DL — LOW (ref 13–17)
MAGNESIUM SERPL-MCNC: 1.8 MG/DL — SIGNIFICANT CHANGE UP (ref 1.6–2.6)
MCHC RBC-ENTMCNC: 29.9 PG — SIGNIFICANT CHANGE UP (ref 27–34)
MCHC RBC-ENTMCNC: 34.1 G/DL — SIGNIFICANT CHANGE UP (ref 32–36)
MCV RBC AUTO: 87.7 FL — SIGNIFICANT CHANGE UP (ref 80–100)
PHOSPHATE SERPL-MCNC: 3.4 MG/DL — SIGNIFICANT CHANGE UP (ref 2.5–4.5)
PLATELET # BLD AUTO: 280 K/UL — SIGNIFICANT CHANGE UP (ref 150–400)
POTASSIUM SERPL-MCNC: 3.9 MMOL/L — SIGNIFICANT CHANGE UP (ref 3.5–5.3)
POTASSIUM SERPL-SCNC: 3.9 MMOL/L — SIGNIFICANT CHANGE UP (ref 3.5–5.3)
PROT SERPL-MCNC: 6.5 G/DL — SIGNIFICANT CHANGE UP (ref 6–8.3)
RBC # BLD: 4.15 M/UL — LOW (ref 4.2–5.8)
RBC # FLD: 14.7 % — SIGNIFICANT CHANGE UP (ref 10.3–16.9)
SODIUM SERPL-SCNC: 140 MMOL/L — SIGNIFICANT CHANGE UP (ref 135–145)
WBC # BLD: 12.6 K/UL — HIGH (ref 3.8–10.5)
WBC # FLD AUTO: 12.6 K/UL — HIGH (ref 3.8–10.5)

## 2018-06-25 PROCEDURE — 99232 SBSQ HOSP IP/OBS MODERATE 35: CPT | Mod: GC

## 2018-06-25 PROCEDURE — 74177 CT ABD & PELVIS W/CONTRAST: CPT | Mod: 26

## 2018-06-25 RX ORDER — DIATRIZOATE MEGLUMINE 180 MG/ML
30 INJECTION, SOLUTION INTRAVESICAL ONCE
Qty: 0 | Refills: 0 | Status: COMPLETED | OUTPATIENT
Start: 2018-06-25 | End: 2018-06-25

## 2018-06-25 RX ORDER — ACETAMINOPHEN 500 MG
1000 TABLET ORAL ONCE
Qty: 0 | Refills: 0 | Status: COMPLETED | OUTPATIENT
Start: 2018-06-25 | End: 2018-06-25

## 2018-06-25 RX ORDER — ONDANSETRON 8 MG/1
4 TABLET, FILM COATED ORAL ONCE
Qty: 0 | Refills: 0 | Status: COMPLETED | OUTPATIENT
Start: 2018-06-25 | End: 2018-06-25

## 2018-06-25 RX ADMIN — SODIUM CHLORIDE 150 MILLILITER(S): 9 INJECTION, SOLUTION INTRAVENOUS at 18:26

## 2018-06-25 RX ADMIN — HYDROMORPHONE HYDROCHLORIDE 0.5 MILLIGRAM(S): 2 INJECTION INTRAMUSCULAR; INTRAVENOUS; SUBCUTANEOUS at 05:12

## 2018-06-25 RX ADMIN — HEPARIN SODIUM 5000 UNIT(S): 5000 INJECTION INTRAVENOUS; SUBCUTANEOUS at 13:40

## 2018-06-25 RX ADMIN — HYDROMORPHONE HYDROCHLORIDE 0.5 MILLIGRAM(S): 2 INJECTION INTRAMUSCULAR; INTRAVENOUS; SUBCUTANEOUS at 18:37

## 2018-06-25 RX ADMIN — Medication 400 MILLIGRAM(S): at 05:30

## 2018-06-25 RX ADMIN — Medication 75 MICROGRAM(S): at 22:41

## 2018-06-25 RX ADMIN — SODIUM CHLORIDE 150 MILLILITER(S): 9 INJECTION, SOLUTION INTRAVENOUS at 03:00

## 2018-06-25 RX ADMIN — PANTOPRAZOLE SODIUM 40 MILLIGRAM(S): 20 TABLET, DELAYED RELEASE ORAL at 11:33

## 2018-06-25 RX ADMIN — HEPARIN SODIUM 5000 UNIT(S): 5000 INJECTION INTRAVENOUS; SUBCUTANEOUS at 22:29

## 2018-06-25 RX ADMIN — HYDROMORPHONE HYDROCHLORIDE 0.5 MILLIGRAM(S): 2 INJECTION INTRAMUSCULAR; INTRAVENOUS; SUBCUTANEOUS at 15:17

## 2018-06-25 RX ADMIN — ONDANSETRON 4 MILLIGRAM(S): 8 TABLET, FILM COATED ORAL at 12:09

## 2018-06-25 RX ADMIN — HEPARIN SODIUM 5000 UNIT(S): 5000 INJECTION INTRAVENOUS; SUBCUTANEOUS at 05:12

## 2018-06-25 RX ADMIN — Medication 400 MILLIGRAM(S): at 23:40

## 2018-06-25 RX ADMIN — HYDROMORPHONE HYDROCHLORIDE 0.5 MILLIGRAM(S): 2 INJECTION INTRAMUSCULAR; INTRAVENOUS; SUBCUTANEOUS at 06:33

## 2018-06-25 RX ADMIN — SODIUM CHLORIDE 150 MILLILITER(S): 9 INJECTION, SOLUTION INTRAVENOUS at 10:21

## 2018-06-25 RX ADMIN — Medication 1000 MILLIGRAM(S): at 06:33

## 2018-06-25 RX ADMIN — HYDROMORPHONE HYDROCHLORIDE 0.5 MILLIGRAM(S): 2 INJECTION INTRAMUSCULAR; INTRAVENOUS; SUBCUTANEOUS at 15:02

## 2018-06-25 RX ADMIN — HYDROMORPHONE HYDROCHLORIDE 0.5 MILLIGRAM(S): 2 INJECTION INTRAMUSCULAR; INTRAVENOUS; SUBCUTANEOUS at 00:00

## 2018-06-25 RX ADMIN — DIATRIZOATE MEGLUMINE 30 MILLILITER(S): 180 INJECTION, SOLUTION INTRAVESICAL at 11:33

## 2018-06-25 NOTE — PROGRESS NOTE ADULT - SUBJECTIVE AND OBJECTIVE BOX
INTERVAL HPI/OVERNIGHT EVENTS: No acute events, AVSS    STATUS POST:  ERCP 6/21    SUBJECTIVE:  Flatus: [ ] YES [ ] NO             Bowel Movement: [ ] YES [ ] NO  Pain (0-10):            Pain Control Adequate: [ ] YES [ ] NO  Nausea: [ ] YES [ ] NO            Vomiting: [ ] YES [ ] NO  Diarrhea: [ ] YES [ ] NO         Constipation: [ ] YES [ ] NO     Chest Pain: [ ] YES [ ] NO    SOB:  [ ] YES [ ] NO    MEDICATIONS  (STANDING):  dextrose 5% + sodium chloride 0.9%. 1000 milliLiter(s) (150 mL/Hr) IV Continuous <Continuous>  heparin  Injectable 5000 Unit(s) SubCutaneous every 8 hours  insulin lispro (HumaLOG) corrective regimen sliding scale.   SubCutaneous every 6 hours  levothyroxine Injectable 75 MICROGram(s) IV Push at bedtime  pantoprazole  Injectable 40 milliGRAM(s) IV Push daily    MEDICATIONS  (PRN):  acetaminophen  IVPB. 1000 milliGRAM(s) IV Intermittent once PRN Moderate Pain (4 - 6)  HYDROmorphone  Injectable 0.5 milliGRAM(s) IV Push every 3 hours PRN Severe Pain (7 - 10)      Vital Signs Last 24 Hrs  T(C): 37.2 (24 Jun 2018 20:30), Max: 38.1 (24 Jun 2018 12:56)  T(F): 98.9 (24 Jun 2018 20:30), Max: 100.5 (24 Jun 2018 12:56)  HR: 70 (24 Jun 2018 20:30) (60 - 70)  BP: 148/90 (24 Jun 2018 20:30) (139/81 - 152/92)  BP(mean): --  RR: 16 (24 Jun 2018 20:30) (15 - 18)  SpO2: 95% (24 Jun 2018 20:30) (94% - 96%)    PHYSICAL EXAM:      Constitutional: A&Ox3    Breasts:    Respiratory: non labored breathing, no respiratory distress    Cardiovascular: NSR, RRR    Gastrointestinal:                 Incision:    Genitourinary:    Extremities: (-) edema                  I&O's Detail    23 Jun 2018 07:01  -  24 Jun 2018 07:00  --------------------------------------------------------  IN:    dextrose 5% + sodium chloride 0.9%.: 3450 mL    IV PiggyBack: 100 mL    Oral Fluid: 400 mL  Total IN: 3950 mL    OUT:    Nasoenteral Tube: 2000 mL    Voided: 1300 mL  Total OUT: 3300 mL    Total NET: 650 mL      24 Jun 2018 07:01  -  25 Jun 2018 03:11  --------------------------------------------------------  IN:    dextrose 5% + sodium chloride 0.9%.: 2250 mL    IV PiggyBack: 650 mL  Total IN: 2900 mL    OUT:    Nasoenteral Tube: 1610 mL    Voided: 550 mL  Total OUT: 2160 mL    Total NET: 740 mL          LABS:                        11.7   11.0  )-----------( 262      ( 24 Jun 2018 05:45 )             35.3     06-24    138  |  101  |  9   ----------------------------<  125<H>  3.4<L>   |  27  |  0.81    Ca    8.3<L>      24 Jun 2018 05:45  Phos  2.7     06-24  Mg     1.8     06-24    TPro  5.8<L>  /  Alb  2.6<L>  /  TBili  0.5  /  DBili  x   /  AST  28  /  ALT  60<H>  /  AlkPhos  66  06-24          RADIOLOGY & ADDITIONAL STUDIES: INTERVAL HPI/OVERNIGHT EVENTS: No acute events, AVSS    STATUS POST:  ERCP 6/21    SUBJECTIVE: Seen at bedside with chief, pain well controlled, denies N/V. admits to BF.    MEDICATIONS  (STANDING):  dextrose 5% + sodium chloride 0.9%. 1000 milliLiter(s) (150 mL/Hr) IV Continuous <Continuous>  heparin  Injectable 5000 Unit(s) SubCutaneous every 8 hours  insulin lispro (HumaLOG) corrective regimen sliding scale.   SubCutaneous every 6 hours  levothyroxine Injectable 75 MICROGram(s) IV Push at bedtime  pantoprazole  Injectable 40 milliGRAM(s) IV Push daily    MEDICATIONS  (PRN):  acetaminophen  IVPB. 1000 milliGRAM(s) IV Intermittent once PRN Moderate Pain (4 - 6)  HYDROmorphone  Injectable 0.5 milliGRAM(s) IV Push every 3 hours PRN Severe Pain (7 - 10)      Vital Signs Last 24 Hrs  T(C): 37.2 (24 Jun 2018 20:30), Max: 38.1 (24 Jun 2018 12:56)  T(F): 98.9 (24 Jun 2018 20:30), Max: 100.5 (24 Jun 2018 12:56)  HR: 70 (24 Jun 2018 20:30) (60 - 70)  BP: 148/90 (24 Jun 2018 20:30) (139/81 - 152/92)  BP(mean): --  RR: 16 (24 Jun 2018 20:30) (15 - 18)  SpO2: 95% (24 Jun 2018 20:30) (94% - 96%)    PHYSICAL EXAM:      Constitutional: A&Ox3    Respiratory: non labored breathing, no respiratory distress    Cardiovascular: NSR, RRR    Gastrointestinal: soft, mildly distended, NT                    Genitourinary: voiding    Extremities: (-) edema                  I&O's Detail    23 Jun 2018 07:01  -  24 Jun 2018 07:00  --------------------------------------------------------  IN:    dextrose 5% + sodium chloride 0.9%.: 3450 mL    IV PiggyBack: 100 mL    Oral Fluid: 400 mL  Total IN: 3950 mL    OUT:    Nasoenteral Tube: 2000 mL    Voided: 1300 mL  Total OUT: 3300 mL    Total NET: 650 mL      24 Jun 2018 07:01  -  25 Jun 2018 03:11  --------------------------------------------------------  IN:    dextrose 5% + sodium chloride 0.9%.: 2250 mL    IV PiggyBack: 650 mL  Total IN: 2900 mL    OUT:    Nasoenteral Tube: 1610 mL    Voided: 550 mL  Total OUT: 2160 mL    Total NET: 740 mL          LABS:                        11.7   11.0  )-----------( 262      ( 24 Jun 2018 05:45 )             35.3     06-24    138  |  101  |  9   ----------------------------<  125<H>  3.4<L>   |  27  |  0.81    Ca    8.3<L>      24 Jun 2018 05:45  Phos  2.7     06-24  Mg     1.8     06-24    TPro  5.8<L>  /  Alb  2.6<L>  /  TBili  0.5  /  DBili  x   /  AST  28  /  ALT  60<H>  /  AlkPhos  66  06-24          RADIOLOGY & ADDITIONAL STUDIES:

## 2018-06-25 NOTE — PROGRESS NOTE ADULT - ASSESSMENT
58M hx duodenitis/gastritis, gallstones, admitted with acute pancreatitis, suspect possible gallstone pancreatitis now s/p ERCP w/ sphincterotomy     NPO/IVF  NGT  DVT ppx  AM labs 58M hx duodenitis/gastritis, gallstones, admitted with acute pancreatitis, suspect possible gallstone pancreatitis now s/p ERCP w/ sphincterotomy     NPO/IVF  NGT pending BF  DVT ppx  AM labs

## 2018-06-25 NOTE — CHART NOTE - NSCHARTNOTEFT_GEN_A_CORE
Admitting Diagnosis:   Patient is a 58y old  Male who presents with a chief complaint of Acute pancreatitis (18 Jun 2018 22:02)      PAST MEDICAL & SURGICAL HISTORY:  PUD (peptic ulcer disease)  No significant past surgical history      Current Nutrition Order: NPO    PO Intake: Good (%) [   ]  Fair (50-75%) [   ] Poor (<25%) [   ] N/A    GI Issues:   C/o mild nausea, no vomiting.   Continues to have bilious output via NGT.   Reports 2 BMs.  Distended per flowsheet.     Pain: No pain reported.     Skin Integrity: WDL per flowsheet.     Labs:   06-25    140  |  99  |  8   ----------------------------<  115<H>  3.9   |  30  |  0.89    Ca    8.6      25 Jun 2018 06:39  Phos  3.4     06-25  Mg     1.8     06-25    TPro  6.5  /  Alb  2.9<L>  /  TBili  0.8  /  DBili  x   /  AST  28  /  ALT  57<H>  /  AlkPhos  79  06-25    CAPILLARY BLOOD GLUCOSE      POCT Blood Glucose.: 101 mg/dL (25 Jun 2018 06:25)  POCT Blood Glucose.: 101 mg/dL (24 Jun 2018 23:33)  POCT Blood Glucose.: 96 mg/dL (24 Jun 2018 16:54)  POCT Blood Glucose.: 97 mg/dL (24 Jun 2018 11:17)      Medications:  MEDICATIONS  (STANDING):  dextrose 5% + sodium chloride 0.9%. 1000 milliLiter(s) (150 mL/Hr) IV Continuous <Continuous>  diatrizoate meglumine/diatrizoate sodium. 30 milliLiter(s) Oral once  heparin  Injectable 5000 Unit(s) SubCutaneous every 8 hours  insulin lispro (HumaLOG) corrective regimen sliding scale.   SubCutaneous every 6 hours  levothyroxine Injectable 75 MICROGram(s) IV Push at bedtime  pantoprazole  Injectable 40 milliGRAM(s) IV Push daily    MEDICATIONS  (PRN):  HYDROmorphone  Injectable 0.5 milliGRAM(s) IV Push every 3 hours PRN Severe Pain (7 - 10)      Weight:  6/23: 224lbs  6/18: 237lbs  Ht:6ft 2inches,IBW:190lbs+/-10%,BMI:30.2,124% of IBW.    Weight Change:   Pt appears to have had a 13lb wt loss since admission. Please taken new wt for accuracy.     Estimated energy needs:   IBW used for calculations as pt >120% of IBW   Nutrient needs based on [facility] standards of care for maintenance in adults.   2152-2583kcal/day (25-30kcal/kg)  86-103g pro/day (1-1.2g/kg)  2152-2583ml/day (25-30ml/kg)    Subjective:   59yo with PMH gastritis/duodenitis presents with epigastric pain, N/V- found to have elevated liver tests and pancreatic enzymes with radiographic evidence of gallstones, borderline dilated CBD -suspected gallstone pancreatitis. Now s/p MRI showing nonobstructing CBD stones and s/p ERCP 6/21 with stone removal and sphincterotomy. Pt seen resting in bed w/ NGT to LIWS. Noted pt has been NPO since admission 6/18. Last 24 hr output was 2160ml. Pt reports having 2 BMs today. Experiencing mild nausea at present, usually well controlled. Pt reports being hungry and eager to eat however is understanding of purpose of NPO diet order. Please see recs below for nutrition support via alternative route.     Previous Nutrition Diagnosis:  inadequate oral intake RT unable to meet needs w/ NPO diet order AEB meeting 0% estimated needs x8 days.     Active [ x  ]  Resolved [   ]    If resolved, new PES:     Goal: Pt to meet >75% estimated needs via most appropriate route. Nutrition support initiated w/in next 24-48 hrs.     Recommendations:  1) If PO not feasible and gut cannot be utilized-recommend alternative route for nutrition support.   2) TPN w/ route per MD: 430g Dex,112g AA,50g 20% lipids (2410kcal, 1.3g pro/kg, GIR 2.77)   Day 1: 150g Dex,112g AA,50g 20% lipids   Day 2: 250g Dex,112g AA,50g 20% lipids   Day 3: 350g Dex,112g AA,50g 20% lipids   Day 4: 430g Dex,112g AA,50g 20% lipids   >> Micronutrients, lytes, fluids per MD discretion.   3) Monitor BG Q6H and replete lytes PRN.   4) monitor bowel function     Education: Pt understanding of current NPO status.    Risk Level: High [  x ] Moderate [   ] Low [   ] Admitting Diagnosis:   Patient is a 58y old  Male who presents with a chief complaint of Acute pancreatitis (18 Jun 2018 22:02)      PAST MEDICAL & SURGICAL HISTORY:  PUD (peptic ulcer disease)  No significant past surgical history      Current Nutrition Order: NPO    PO Intake: Good (%) [   ]  Fair (50-75%) [   ] Poor (<25%) [   ] N/A    GI Issues:   C/o mild nausea, no vomiting.   Continues to have bilious output via NGT.   Reports 2 BMs.  Distended per flowsheet.     Pain: No pain reported.     Skin Integrity: WDL per flowsheet.     Labs:   06-25    140  |  99  |  8   ----------------------------<  115<H>  3.9   |  30  |  0.89    Ca    8.6      25 Jun 2018 06:39  Phos  3.4     06-25  Mg     1.8     06-25    TPro  6.5  /  Alb  2.9<L>  /  TBili  0.8  /  DBili  x   /  AST  28  /  ALT  57<H>  /  AlkPhos  79  06-25    CAPILLARY BLOOD GLUCOSE      POCT Blood Glucose.: 101 mg/dL (25 Jun 2018 06:25)  POCT Blood Glucose.: 101 mg/dL (24 Jun 2018 23:33)  POCT Blood Glucose.: 96 mg/dL (24 Jun 2018 16:54)  POCT Blood Glucose.: 97 mg/dL (24 Jun 2018 11:17)      Medications:  MEDICATIONS  (STANDING):  dextrose 5% + sodium chloride 0.9%. 1000 milliLiter(s) (150 mL/Hr) IV Continuous <Continuous>  diatrizoate meglumine/diatrizoate sodium. 30 milliLiter(s) Oral once  heparin  Injectable 5000 Unit(s) SubCutaneous every 8 hours  insulin lispro (HumaLOG) corrective regimen sliding scale.   SubCutaneous every 6 hours  levothyroxine Injectable 75 MICROGram(s) IV Push at bedtime  pantoprazole  Injectable 40 milliGRAM(s) IV Push daily    MEDICATIONS  (PRN):  HYDROmorphone  Injectable 0.5 milliGRAM(s) IV Push every 3 hours PRN Severe Pain (7 - 10)      Weight:  6/23: 224lbs  6/18: 237lbs  Ht:6ft 2inches,IBW:190lbs+/-10%,BMI:30.2,124% of IBW.    Weight Change:   Pt appears to have had a 13lb wt loss since admission. Please taken new wt for accuracy.     Estimated energy needs:   IBW used for calculations as pt >120% of IBW   Nutrient needs based on [facility] standards of care for maintenance in adults.   2152-2583kcal/day (25-30kcal/kg)  86-103g pro/day (1-1.2g/kg)  2152-2583ml/day (25-30ml/kg)    Subjective:   57yo with PMH gastritis/duodenitis presents with epigastric pain, N/V- found to have elevated liver tests and pancreatic enzymes with radiographic evidence of gallstones, borderline dilated CBD -suspected gallstone pancreatitis. Now s/p MRI showing nonobstructing CBD stones and s/p ERCP 6/21 with stone removal and sphincterotomy. Pt seen resting in bed w/ NGT to LIWS. Noted pt has been NPO since admission 6/18. Last 24 hr output was 2160ml. Pt reports having 2 BMs today. Experiencing mild nausea at present, usually well controlled. Pt reports being hungry and eager to eat however is understanding of purpose of NPO diet order. Please see recs below for nutrition support via alternative route.     Previous Nutrition Diagnosis:  inadequate oral intake RT unable to meet needs w/ NPO diet order AEB meeting 0% estimated needs x8 days.     Active [ x  ]  Resolved [   ]    If resolved, new PES:     Goal: Pt to meet >75% estimated needs via most appropriate route. Nutrition support initiated w/in next 24-48 hrs.     Recommendations:  1) If PO not feasible and gut cannot be utilized-recommend alternative route for nutrition support.   2) TPN w/ route per MD: 430g Dex,112g AA,50g 20% lipids (2410kcal, 1.3g pro/kg, GIR 2.77)   Day 1: 150g Dex,112g AA,50g 20% lipids   Day 2: 250g Dex,112g AA,50g 20% lipids   Day 3: 350g Dex,112g AA,50g 20% lipids   Day 4: 430g Dex,112g AA,50g 20% lipids   >> Micronutrients, lytes, fluids per MD discretion.   3) Monitor BG Q6H and replete lytes PRN.   4) monitor bowel function   team paged 2x to discuss recs and plan for diet advancement     Education: Pt understanding of current NPO status.    Risk Level: High [  x ] Moderate [   ] Low [   ]

## 2018-06-25 NOTE — PROGRESS NOTE ADULT - SUBJECTIVE AND OBJECTIVE BOX
Pt seen and examined. Abd distension greatly improved and had 3 BM today but NG still in draining bilious fluid, 400mL in container. 100.5 temp yesterday.     MEDICATIONS:  MEDICATIONS  (STANDING):  dextrose 5% + sodium chloride 0.9%. 1000 milliLiter(s) (150 mL/Hr) IV Continuous <Continuous>  heparin  Injectable 5000 Unit(s) SubCutaneous every 8 hours  insulin lispro (HumaLOG) corrective regimen sliding scale.   SubCutaneous every 6 hours  levothyroxine Injectable 75 MICROGram(s) IV Push at bedtime  ondansetron Injectable 4 milliGRAM(s) IV Push once  pantoprazole  Injectable 40 milliGRAM(s) IV Push daily    MEDICATIONS  (PRN):  HYDROmorphone  Injectable 0.5 milliGRAM(s) IV Push every 3 hours PRN Severe Pain (7 - 10)      Allergies    No Known Allergies    Intolerances        Vital Signs Last 24 Hrs  T(C): 37.7 (25 Jun 2018 08:39), Max: 38.6 (25 Jun 2018 05:15)  T(F): 99.9 (25 Jun 2018 08:39), Max: 101.4 (25 Jun 2018 05:15)  HR: 61 (25 Jun 2018 08:39) (61 - 72)  BP: 149/87 (25 Jun 2018 08:39) (148/85 - 164/82)  BP(mean): --  RR: 16 (25 Jun 2018 08:39) (16 - 18)  SpO2: 98% (25 Jun 2018 08:39) (94% - 98%)    06-24 @ 07:01  -  06-25 @ 07:00  --------------------------------------------------------  IN: 2900 mL / OUT: 2910 mL / NET: -10 mL        PHYSICAL EXAM:    General: Well developed; well nourished; in no acute distress  HEENT: MMM, conjunctiva and sclera clear  Gastrointestinal: Soft moderately TTP epigastrium nonperitoneal  Skin: Warm and dry. No obvious rash    LABS:      CBC Full  -  ( 25 Jun 2018 06:39 )  WBC Count : 12.6 K/uL  Hemoglobin : 12.4 g/dL  Hematocrit : 36.4 %  Platelet Count - Automated : 280 K/uL  Mean Cell Volume : 87.7 fL  Mean Cell Hemoglobin : 29.9 pg  Mean Cell Hemoglobin Concentration : 34.1 g/dL  Auto Neutrophil # : x  Auto Lymphocyte # : x  Auto Monocyte # : x  Auto Eosinophil # : x  Auto Basophil # : x  Auto Neutrophil % : x  Auto Lymphocyte % : x  Auto Monocyte % : x  Auto Eosinophil % : x  Auto Basophil % : x    06-25    140  |  99  |  8   ----------------------------<  115<H>  3.9   |  30  |  0.89    Ca    8.6      25 Jun 2018 06:39  Phos  3.4     06-25  Mg     1.8     06-25    TPro  6.5  /  Alb  2.9<L>  /  TBili  0.8  /  DBili  x   /  AST  28  /  ALT  57<H>  /  AlkPhos  79  06-25                      RADIOLOGY & ADDITIONAL STUDIES (The following images were personally reviewed):

## 2018-06-25 NOTE — PROGRESS NOTE ADULT - ASSESSMENT
58M with hx hypothyroid on synthroid and recent EGD found to have gastritis/duodenitis presents with epigastric pain, nausea vomiting found to have elevated liver tests and pancreatic enzymes with radiographic evidence of gallstones, borderline dilated CBD up to 6.8mm with gradual tapering distally at the level of the ampulla and no choledocholithiasis seen, as well as moderate peripancreatic fat stranding c/w acute pancreatitis, likely gallstone pancreatitis with no current evidence of cholangitis and improving liver tests now s/p MRI showing nonobstructing CBD stones and s/p ERCP 6/21 with stone removal and sphincterotomy now with new fevers will need to evaluate for necrosis (likely sterile so early) vs abscess or other source of infection  - CT abd/pelvis  - NPO  - continue IVF   - pain mgmt, non-opiates if possible  - monitor bilious drainage  - Replete lytes PRN  - encourage sitting/ambulation  - Continue care as per surgery

## 2018-06-26 LAB
ANION GAP SERPL CALC-SCNC: 9 MMOL/L — SIGNIFICANT CHANGE UP (ref 5–17)
BUN SERPL-MCNC: 7 MG/DL — SIGNIFICANT CHANGE UP (ref 7–23)
CALCIUM SERPL-MCNC: 8.6 MG/DL — SIGNIFICANT CHANGE UP (ref 8.4–10.5)
CHLORIDE SERPL-SCNC: 98 MMOL/L — SIGNIFICANT CHANGE UP (ref 96–108)
CO2 SERPL-SCNC: 32 MMOL/L — HIGH (ref 22–31)
CREAT SERPL-MCNC: 0.81 MG/DL — SIGNIFICANT CHANGE UP (ref 0.5–1.3)
GLUCOSE BLDC GLUCOMTR-MCNC: 104 MG/DL — HIGH (ref 70–99)
GLUCOSE BLDC GLUCOMTR-MCNC: 91 MG/DL — SIGNIFICANT CHANGE UP (ref 70–99)
GLUCOSE BLDC GLUCOMTR-MCNC: 93 MG/DL — SIGNIFICANT CHANGE UP (ref 70–99)
GLUCOSE BLDC GLUCOMTR-MCNC: 95 MG/DL — SIGNIFICANT CHANGE UP (ref 70–99)
GLUCOSE SERPL-MCNC: 96 MG/DL — SIGNIFICANT CHANGE UP (ref 70–99)
HCT VFR BLD CALC: 39 % — SIGNIFICANT CHANGE UP (ref 39–50)
HGB BLD-MCNC: 13.1 G/DL — SIGNIFICANT CHANGE UP (ref 13–17)
MAGNESIUM SERPL-MCNC: 1.8 MG/DL — SIGNIFICANT CHANGE UP (ref 1.6–2.6)
MCHC RBC-ENTMCNC: 29.3 PG — SIGNIFICANT CHANGE UP (ref 27–34)
MCHC RBC-ENTMCNC: 33.6 G/DL — SIGNIFICANT CHANGE UP (ref 32–36)
MCV RBC AUTO: 87.2 FL — SIGNIFICANT CHANGE UP (ref 80–100)
PHOSPHATE SERPL-MCNC: 3.2 MG/DL — SIGNIFICANT CHANGE UP (ref 2.5–4.5)
PLATELET # BLD AUTO: 289 K/UL — SIGNIFICANT CHANGE UP (ref 150–400)
POTASSIUM SERPL-MCNC: 3.3 MMOL/L — LOW (ref 3.5–5.3)
POTASSIUM SERPL-SCNC: 3.3 MMOL/L — LOW (ref 3.5–5.3)
RBC # BLD: 4.47 M/UL — SIGNIFICANT CHANGE UP (ref 4.2–5.8)
RBC # FLD: 15 % — SIGNIFICANT CHANGE UP (ref 10.3–16.9)
SODIUM SERPL-SCNC: 139 MMOL/L — SIGNIFICANT CHANGE UP (ref 135–145)
WBC # BLD: 15.5 K/UL — HIGH (ref 3.8–10.5)
WBC # FLD AUTO: 15.5 K/UL — HIGH (ref 3.8–10.5)

## 2018-06-26 PROCEDURE — 99232 SBSQ HOSP IP/OBS MODERATE 35: CPT | Mod: GC

## 2018-06-26 RX ORDER — ELECTROLYTE SOLUTION,INJ
1 VIAL (ML) INTRAVENOUS
Qty: 0 | Refills: 0 | Status: DISCONTINUED | OUTPATIENT
Start: 2018-06-26 | End: 2018-06-27

## 2018-06-26 RX ORDER — ACETAMINOPHEN 500 MG
1000 TABLET ORAL ONCE
Qty: 0 | Refills: 0 | Status: COMPLETED | OUTPATIENT
Start: 2018-06-26 | End: 2018-06-26

## 2018-06-26 RX ORDER — MAGNESIUM SULFATE 500 MG/ML
1 VIAL (ML) INJECTION ONCE
Qty: 0 | Refills: 0 | Status: COMPLETED | OUTPATIENT
Start: 2018-06-26 | End: 2018-06-26

## 2018-06-26 RX ORDER — POTASSIUM CHLORIDE 20 MEQ
10 PACKET (EA) ORAL
Qty: 0 | Refills: 0 | Status: COMPLETED | OUTPATIENT
Start: 2018-06-26 | End: 2018-06-26

## 2018-06-26 RX ORDER — SODIUM CHLORIDE 9 MG/ML
1000 INJECTION INTRAMUSCULAR; INTRAVENOUS; SUBCUTANEOUS ONCE
Qty: 0 | Refills: 0 | Status: COMPLETED | OUTPATIENT
Start: 2018-06-26 | End: 2018-06-26

## 2018-06-26 RX ADMIN — HEPARIN SODIUM 5000 UNIT(S): 5000 INJECTION INTRAVENOUS; SUBCUTANEOUS at 22:00

## 2018-06-26 RX ADMIN — HYDROMORPHONE HYDROCHLORIDE 0.5 MILLIGRAM(S): 2 INJECTION INTRAMUSCULAR; INTRAVENOUS; SUBCUTANEOUS at 01:30

## 2018-06-26 RX ADMIN — HYDROMORPHONE HYDROCHLORIDE 0.5 MILLIGRAM(S): 2 INJECTION INTRAMUSCULAR; INTRAVENOUS; SUBCUTANEOUS at 21:56

## 2018-06-26 RX ADMIN — Medication 100 MILLIEQUIVALENT(S): at 10:09

## 2018-06-26 RX ADMIN — PANTOPRAZOLE SODIUM 40 MILLIGRAM(S): 20 TABLET, DELAYED RELEASE ORAL at 11:38

## 2018-06-26 RX ADMIN — HYDROMORPHONE HYDROCHLORIDE 0.5 MILLIGRAM(S): 2 INJECTION INTRAMUSCULAR; INTRAVENOUS; SUBCUTANEOUS at 16:53

## 2018-06-26 RX ADMIN — HYDROMORPHONE HYDROCHLORIDE 0.5 MILLIGRAM(S): 2 INJECTION INTRAMUSCULAR; INTRAVENOUS; SUBCUTANEOUS at 10:30

## 2018-06-26 RX ADMIN — SODIUM CHLORIDE 3603.6 MILLILITER(S): 9 INJECTION INTRAMUSCULAR; INTRAVENOUS; SUBCUTANEOUS at 05:48

## 2018-06-26 RX ADMIN — Medication 100 MILLIEQUIVALENT(S): at 11:38

## 2018-06-26 RX ADMIN — HEPARIN SODIUM 5000 UNIT(S): 5000 INJECTION INTRAVENOUS; SUBCUTANEOUS at 13:30

## 2018-06-26 RX ADMIN — HYDROMORPHONE HYDROCHLORIDE 0.5 MILLIGRAM(S): 2 INJECTION INTRAMUSCULAR; INTRAVENOUS; SUBCUTANEOUS at 13:55

## 2018-06-26 RX ADMIN — HYDROMORPHONE HYDROCHLORIDE 0.5 MILLIGRAM(S): 2 INJECTION INTRAMUSCULAR; INTRAVENOUS; SUBCUTANEOUS at 17:15

## 2018-06-26 RX ADMIN — HYDROMORPHONE HYDROCHLORIDE 0.5 MILLIGRAM(S): 2 INJECTION INTRAMUSCULAR; INTRAVENOUS; SUBCUTANEOUS at 21:41

## 2018-06-26 RX ADMIN — HYDROMORPHONE HYDROCHLORIDE 0.5 MILLIGRAM(S): 2 INJECTION INTRAMUSCULAR; INTRAVENOUS; SUBCUTANEOUS at 10:12

## 2018-06-26 RX ADMIN — Medication 75 MICROGRAM(S): at 22:26

## 2018-06-26 RX ADMIN — Medication 100 GRAM(S): at 10:08

## 2018-06-26 RX ADMIN — HYDROMORPHONE HYDROCHLORIDE 0.5 MILLIGRAM(S): 2 INJECTION INTRAMUSCULAR; INTRAVENOUS; SUBCUTANEOUS at 00:20

## 2018-06-26 RX ADMIN — Medication 400 MILLIGRAM(S): at 16:47

## 2018-06-26 RX ADMIN — HYDROMORPHONE HYDROCHLORIDE 0.5 MILLIGRAM(S): 2 INJECTION INTRAMUSCULAR; INTRAVENOUS; SUBCUTANEOUS at 13:33

## 2018-06-26 RX ADMIN — HEPARIN SODIUM 5000 UNIT(S): 5000 INJECTION INTRAVENOUS; SUBCUTANEOUS at 05:48

## 2018-06-26 NOTE — PROGRESS NOTE ADULT - SUBJECTIVE AND OBJECTIVE BOX
Pt seen and examined. Pt states passing gas, having BM, abdominal distension decreasing. COntinues to have NGT output and abdominal pain. Fever overnight.    REVIEW OF SYSTEMS as per HPI       MEDICATIONS:  MEDICATIONS  (STANDING):  dextrose 5% + sodium chloride 0.9%. 1000 milliLiter(s) (150 mL/Hr) IV Continuous <Continuous>  heparin  Injectable 5000 Unit(s) SubCutaneous every 8 hours  insulin lispro (HumaLOG) corrective regimen sliding scale.   SubCutaneous every 6 hours  levothyroxine Injectable 75 MICROGram(s) IV Push at bedtime  pantoprazole  Injectable 40 milliGRAM(s) IV Push daily  Parenteral Nutrition - Adult 1 Each (67 mL/Hr) TPN Continuous <Continuous>    MEDICATIONS  (PRN):  HYDROmorphone  Injectable 0.5 milliGRAM(s) IV Push every 3 hours PRN Severe Pain (7 - 10)      Allergies    No Known Allergies    Intolerances        Vital Signs Last 24 Hrs  T(C): 38.4 (26 Jun 2018 16:38), Max: 38.6 (25 Jun 2018 23:30)  T(F): 101.1 (26 Jun 2018 16:38), Max: 101.4 (25 Jun 2018 23:30)  HR: 69 (26 Jun 2018 16:11) (69 - 74)  BP: 155/87 (26 Jun 2018 16:11) (128/77 - 171/89)  BP(mean): --  RR: 17 (26 Jun 2018 16:11) (16 - 17)  SpO2: 96% (26 Jun 2018 16:11) (95% - 97%)    06-25 @ 07:01 - 06-26 @ 07:00  --------------------------------------------------------  IN: 3450 mL / OUT: 4000 mL / NET: -550 mL    06-26 @ 07:01 - 06-26 @ 19:44  --------------------------------------------------------  IN: 2650 mL / OUT: 800 mL / NET: 1850 mL        PHYSICAL EXAM:    General: Well developed; well nourished; in no acute distress  HEENT: MMM, conjunctiva and sclera clear  Gastrointestinal: Soft moderately distended tympanic TTP epigastrium non peritoneal  Skin: Warm and dry. No obvious rash    LABS:      CBC Full  -  ( 26 Jun 2018 07:26 )  WBC Count : 15.5 K/uL  Hemoglobin : 13.1 g/dL  Hematocrit : 39.0 %  Platelet Count - Automated : 289 K/uL  Mean Cell Volume : 87.2 fL  Mean Cell Hemoglobin : 29.3 pg  Mean Cell Hemoglobin Concentration : 33.6 g/dL  Auto Neutrophil # : x  Auto Lymphocyte # : x  Auto Monocyte # : x  Auto Eosinophil # : x  Auto Basophil # : x  Auto Neutrophil % : x  Auto Lymphocyte % : x  Auto Monocyte % : x  Auto Eosinophil % : x  Auto Basophil % : x    06-26    139  |  98  |  7   ----------------------------<  96  3.3<L>   |  32<H>  |  0.81    Ca    8.6      26 Jun 2018 07:26  Phos  3.2     06-26  Mg     1.8     06-26    TPro  6.5  /  Alb  2.9<L>  /  TBili  0.8  /  DBili  x   /  AST  28  /  ALT  57<H>  /  AlkPhos  79  06-25                      RADIOLOGY & ADDITIONAL STUDIES (The following images were personally reviewed):

## 2018-06-26 NOTE — PROGRESS NOTE ADULT - SUBJECTIVE AND OBJECTIVE BOX
ovn: temp of 101.4. HARSHA.  6/25: CT-worsening pancreatitis- (worsening pancreatic head edema vs early pancreatic necrosis) and worsening dilatation of small bowel loops    58M hx duodenitis/gastritis, gallstones, admitted with acute pancreatitis, suspect possible gallstone pancreatitis now s/p ERCP w/ sphincterotomy     NPO/IVF  NGT  DVT ppx  AM labs ovn: temp of 101.4. HARSHA.  6/25: CT-worsening pancreatitis- (worsening pancreatic head edema vs early pancreatic necrosis) and worsening dilatation of small bowel loops    SUBJECTIVE: Febrile overnight to 101.4F. No other events overnight.       Vital Signs Last 24 Hrs  T(C): 36.9 (26 Jun 2018 05:00), Max: 38.6 (25 Jun 2018 23:30)  T(F): 98.5 (26 Jun 2018 05:00), Max: 101.4 (25 Jun 2018 23:30)  HR: 71 (26 Jun 2018 05:00) (60 - 74)  BP: 128/77 (26 Jun 2018 05:00) (128/77 - 171/89)  BP(mean): --  RR: 16 (26 Jun 2018 05:00) (16 - 17)  SpO2: 97% (26 Jun 2018 05:00) (93% - 98%)    General: NAD, resting comfortably in bed  Pulm: Nonlabored breathing, no respiratory distress  Abd: soft, NT/ND.  Extrem: WWP, no edema, SCDs in place      LABS:                        13.1   15.5  )-----------( 289      ( 26 Jun 2018 07:26 )             39.0     06-26    139  |  98  |  7   ----------------------------<  96  3.3<L>   |  32<H>  |  0.81    Ca    8.6      26 Jun 2018 07:26  Phos  3.2     06-26  Mg     1.8     06-26    TPro  6.5  /  Alb  2.9<L>  /  TBili  0.8  /  DBili  x   /  AST  28  /  ALT  57<H>  /  AlkPhos  79  06-25          < from: CT Abdomen and Pelvis w/ Oral Cont and w/ IV Cont (06.25.18 @ 14:44) >  IMPRESSION:  Worsening acute pancreatitis. Probable peripancreatic necrosis.  Further   decreased heterogeneous enhancement of the head of the pancreas which may   represent worsening pancreatic head edema from acute pancreatitis versus   early pancreatic necrosis, close follow-up recommended. No fluid   collections.    Cholelithiasis.    Worsening dilatation of small bowel loops probably representing small   bowel ileus from the adjacent pancreatitis. Follow-up recommended.

## 2018-06-26 NOTE — PROGRESS NOTE ADULT - ASSESSMENT
58M with hx hypothyroid on synthroid and recent EGD found to have gastritis/duodenitis presents with epigastric pain, nausea vomiting found to have elevated liver tests and pancreatic enzymes with radiographic evidence of gallstones, borderline dilated CBD up to 6.8mm with gradual tapering distally at the level of the ampulla and no choledocholithiasis seen, as well as moderate peripancreatic fat stranding c/w acute pancreatitis, likely gallstone pancreatitis with no current evidence of cholangitis and improving liver tests now s/p MRI showing nonobstructing CBD stones and s/p ERCP 6/21 with stone removal and sphincterotomy now with new fevers s/p CT showing persistent pancreatitis and possible necrosis  - NPO  - continue IVF   - pain mgmt, non-opiates if possible  - monitor bilious drainage  - Replete lytes PRN  - parenteral nutrition in setting of ileus  - encourage sitting/ambulation  - Continue care as per surgery

## 2018-06-26 NOTE — CONSULT NOTE ADULT - SUBJECTIVE AND OBJECTIVE BOX
Surgery requested a peripherally inserted central venous catheter to accommodate TPN. The history of gallstone pancreatitis, gastritis and duodenitis is noted along with the cxr and  data. Ultrasound   guidance will be used for the suitable basilic/ brachial veins. The procedure and indications were explained for consent.

## 2018-06-27 LAB
ALBUMIN SERPL ELPH-MCNC: 2.8 G/DL — LOW (ref 3.3–5)
ALP SERPL-CCNC: 79 U/L — SIGNIFICANT CHANGE UP (ref 40–120)
ALT FLD-CCNC: 36 U/L — SIGNIFICANT CHANGE UP (ref 10–45)
ANION GAP SERPL CALC-SCNC: 10 MMOL/L — SIGNIFICANT CHANGE UP (ref 5–17)
AST SERPL-CCNC: 20 U/L — SIGNIFICANT CHANGE UP (ref 10–40)
BILIRUB SERPL-MCNC: 1 MG/DL — SIGNIFICANT CHANGE UP (ref 0.2–1.2)
BUN SERPL-MCNC: 6 MG/DL — LOW (ref 7–23)
CALCIUM SERPL-MCNC: 8.3 MG/DL — LOW (ref 8.4–10.5)
CHLORIDE SERPL-SCNC: 98 MMOL/L — SIGNIFICANT CHANGE UP (ref 96–108)
CO2 SERPL-SCNC: 29 MMOL/L — SIGNIFICANT CHANGE UP (ref 22–31)
CREAT SERPL-MCNC: 0.77 MG/DL — SIGNIFICANT CHANGE UP (ref 0.5–1.3)
GLUCOSE BLDC GLUCOMTR-MCNC: 104 MG/DL — HIGH (ref 70–99)
GLUCOSE BLDC GLUCOMTR-MCNC: 104 MG/DL — HIGH (ref 70–99)
GLUCOSE BLDC GLUCOMTR-MCNC: 105 MG/DL — HIGH (ref 70–99)
GLUCOSE BLDC GLUCOMTR-MCNC: 94 MG/DL — SIGNIFICANT CHANGE UP (ref 70–99)
GLUCOSE SERPL-MCNC: 115 MG/DL — HIGH (ref 70–99)
HCT VFR BLD CALC: 34.5 % — LOW (ref 39–50)
HGB BLD-MCNC: 11.9 G/DL — LOW (ref 13–17)
MAGNESIUM SERPL-MCNC: 1.8 MG/DL — SIGNIFICANT CHANGE UP (ref 1.6–2.6)
MCHC RBC-ENTMCNC: 29.6 PG — SIGNIFICANT CHANGE UP (ref 27–34)
MCHC RBC-ENTMCNC: 34.5 G/DL — SIGNIFICANT CHANGE UP (ref 32–36)
MCV RBC AUTO: 85.8 FL — SIGNIFICANT CHANGE UP (ref 80–100)
PHOSPHATE SERPL-MCNC: 2.5 MG/DL — SIGNIFICANT CHANGE UP (ref 2.5–4.5)
PLATELET # BLD AUTO: 256 K/UL — SIGNIFICANT CHANGE UP (ref 150–400)
POTASSIUM SERPL-MCNC: 3.3 MMOL/L — LOW (ref 3.5–5.3)
POTASSIUM SERPL-SCNC: 3.3 MMOL/L — LOW (ref 3.5–5.3)
PROT SERPL-MCNC: 6.1 G/DL — SIGNIFICANT CHANGE UP (ref 6–8.3)
RBC # BLD: 4.02 M/UL — LOW (ref 4.2–5.8)
RBC # FLD: 14.5 % — SIGNIFICANT CHANGE UP (ref 10.3–16.9)
SODIUM SERPL-SCNC: 137 MMOL/L — SIGNIFICANT CHANGE UP (ref 135–145)
WBC # BLD: 15.9 K/UL — HIGH (ref 3.8–10.5)
WBC # FLD AUTO: 15.9 K/UL — HIGH (ref 3.8–10.5)

## 2018-06-27 PROCEDURE — 99232 SBSQ HOSP IP/OBS MODERATE 35: CPT | Mod: GC

## 2018-06-27 PROCEDURE — 71045 X-RAY EXAM CHEST 1 VIEW: CPT | Mod: 26

## 2018-06-27 RX ORDER — I.V. FAT EMULSION 20 G/100ML
0.46 EMULSION INTRAVENOUS
Qty: 50 | Refills: 0 | Status: DISCONTINUED | OUTPATIENT
Start: 2018-06-27 | End: 2018-06-27

## 2018-06-27 RX ORDER — ACETAMINOPHEN 500 MG
1000 TABLET ORAL ONCE
Qty: 0 | Refills: 0 | Status: COMPLETED | OUTPATIENT
Start: 2018-06-27 | End: 2018-06-27

## 2018-06-27 RX ORDER — I.V. FAT EMULSION 20 G/100ML
50 EMULSION INTRAVENOUS
Qty: 5375 | Refills: 0 | Status: DISCONTINUED | OUTPATIENT
Start: 2018-06-27 | End: 2018-06-27

## 2018-06-27 RX ORDER — SODIUM CHLORIDE 9 MG/ML
500 INJECTION INTRAMUSCULAR; INTRAVENOUS; SUBCUTANEOUS ONCE
Qty: 0 | Refills: 0 | Status: COMPLETED | OUTPATIENT
Start: 2018-06-27 | End: 2018-06-27

## 2018-06-27 RX ORDER — ELECTROLYTE SOLUTION,INJ
1 VIAL (ML) INTRAVENOUS
Qty: 0 | Refills: 0 | Status: DISCONTINUED | OUTPATIENT
Start: 2018-06-27 | End: 2018-06-27

## 2018-06-27 RX ORDER — SODIUM CHLORIDE 9 MG/ML
1000 INJECTION INTRAMUSCULAR; INTRAVENOUS; SUBCUTANEOUS
Qty: 0 | Refills: 0 | Status: DISCONTINUED | OUTPATIENT
Start: 2018-06-27 | End: 2018-06-28

## 2018-06-27 RX ADMIN — HYDROMORPHONE HYDROCHLORIDE 0.5 MILLIGRAM(S): 2 INJECTION INTRAMUSCULAR; INTRAVENOUS; SUBCUTANEOUS at 14:06

## 2018-06-27 RX ADMIN — HEPARIN SODIUM 5000 UNIT(S): 5000 INJECTION INTRAVENOUS; SUBCUTANEOUS at 06:00

## 2018-06-27 RX ADMIN — SODIUM CHLORIDE 150 MILLILITER(S): 9 INJECTION INTRAMUSCULAR; INTRAVENOUS; SUBCUTANEOUS at 02:44

## 2018-06-27 RX ADMIN — HYDROMORPHONE HYDROCHLORIDE 0.5 MILLIGRAM(S): 2 INJECTION INTRAMUSCULAR; INTRAVENOUS; SUBCUTANEOUS at 17:45

## 2018-06-27 RX ADMIN — HYDROMORPHONE HYDROCHLORIDE 0.5 MILLIGRAM(S): 2 INJECTION INTRAMUSCULAR; INTRAVENOUS; SUBCUTANEOUS at 14:31

## 2018-06-27 RX ADMIN — HYDROMORPHONE HYDROCHLORIDE 0.5 MILLIGRAM(S): 2 INJECTION INTRAMUSCULAR; INTRAVENOUS; SUBCUTANEOUS at 22:15

## 2018-06-27 RX ADMIN — HYDROMORPHONE HYDROCHLORIDE 0.5 MILLIGRAM(S): 2 INJECTION INTRAMUSCULAR; INTRAVENOUS; SUBCUTANEOUS at 21:57

## 2018-06-27 RX ADMIN — Medication 1 EACH: at 01:36

## 2018-06-27 RX ADMIN — HYDROMORPHONE HYDROCHLORIDE 0.5 MILLIGRAM(S): 2 INJECTION INTRAMUSCULAR; INTRAVENOUS; SUBCUTANEOUS at 01:32

## 2018-06-27 RX ADMIN — Medication 100 UNIT(S): at 11:13

## 2018-06-27 RX ADMIN — HYDROMORPHONE HYDROCHLORIDE 0.5 MILLIGRAM(S): 2 INJECTION INTRAMUSCULAR; INTRAVENOUS; SUBCUTANEOUS at 01:45

## 2018-06-27 RX ADMIN — HEPARIN SODIUM 5000 UNIT(S): 5000 INJECTION INTRAVENOUS; SUBCUTANEOUS at 13:38

## 2018-06-27 RX ADMIN — HYDROMORPHONE HYDROCHLORIDE 0.5 MILLIGRAM(S): 2 INJECTION INTRAMUSCULAR; INTRAVENOUS; SUBCUTANEOUS at 18:00

## 2018-06-27 RX ADMIN — HYDROMORPHONE HYDROCHLORIDE 0.5 MILLIGRAM(S): 2 INJECTION INTRAMUSCULAR; INTRAVENOUS; SUBCUTANEOUS at 05:35

## 2018-06-27 RX ADMIN — HYDROMORPHONE HYDROCHLORIDE 0.5 MILLIGRAM(S): 2 INJECTION INTRAMUSCULAR; INTRAVENOUS; SUBCUTANEOUS at 05:50

## 2018-06-27 RX ADMIN — SODIUM CHLORIDE 1801.8 MILLILITER(S): 9 INJECTION INTRAMUSCULAR; INTRAVENOUS; SUBCUTANEOUS at 07:02

## 2018-06-27 RX ADMIN — HEPARIN SODIUM 5000 UNIT(S): 5000 INJECTION INTRAVENOUS; SUBCUTANEOUS at 21:57

## 2018-06-27 RX ADMIN — PANTOPRAZOLE SODIUM 40 MILLIGRAM(S): 20 TABLET, DELAYED RELEASE ORAL at 11:13

## 2018-06-27 RX ADMIN — Medication 400 MILLIGRAM(S): at 19:57

## 2018-06-27 RX ADMIN — Medication 75 MICROGRAM(S): at 22:08

## 2018-06-27 RX ADMIN — Medication 400 MILLIGRAM(S): at 07:58

## 2018-06-27 RX ADMIN — I.V. FAT EMULSION 20.6 GM/KG/DAY: 20 EMULSION INTRAVENOUS at 21:57

## 2018-06-27 NOTE — PROCEDURE NOTE - NSPROCDETAILS_GEN_ALL_CORE
supine position/location identified, draped/prepped, sterile technique used/sterile dressing applied/sterile technique, catheter placed/ultrasound guidance

## 2018-06-27 NOTE — PROGRESS NOTE ADULT - SUBJECTIVE AND OBJECTIVE BOX
ovn: PICC placed. CXR confirms proper cavoatrial placement. TPN started  6/26: PICC by Dr. Roman, TPN ordered, 101.1 t.max    58M hx duodenitis/gastritis, gallstones, admitted with acute pancreatitis, suspect possible gallstone pancreatitis now s/p ERCP w/ sphincterotomy     NPO/IVF  NGT  DVT ppx  AM labs ovn: PICC placed. CXR confirms proper cavoatrial placement. TPN started  6/26: PICC by Dr. Roman, TPN ordered, 101.1 t.max      SUBJECTIVE: Pt states abdominal pain is unchanged. admits to having BF. Denies N/V      MEDICATIONS  (STANDING):  heparin  flush 100 Units/mL Injectable 100 Unit(s) IV Push every other day  heparin  Injectable 5000 Unit(s) SubCutaneous every 8 hours  insulin lispro (HumaLOG) corrective regimen sliding scale.   SubCutaneous every 6 hours  levothyroxine Injectable 75 MICROGram(s) IV Push at bedtime  pantoprazole  Injectable 40 milliGRAM(s) IV Push daily  Parenteral Nutrition - Adult 1 Each (67 mL/Hr) TPN Continuous <Continuous>  sodium chloride 0.9%. 1000 milliLiter(s) (150 mL/Hr) IV Continuous <Continuous>    MEDICATIONS  (PRN):  HYDROmorphone  Injectable 0.5 milliGRAM(s) IV Push every 3 hours PRN Severe Pain (7 - 10)      Vital Signs Last 24 Hrs  T(C): 38.3 (27 Jun 2018 05:40), Max: 38.4 (26 Jun 2018 16:38)  T(F): 100.9 (27 Jun 2018 05:40), Max: 101.1 (26 Jun 2018 16:38)  HR: 70 (27 Jun 2018 05:40) (59 - 74)  BP: 160/77 (27 Jun 2018 05:40) (141/91 - 164/92)  BP(mean): --  RR: 17 (27 Jun 2018 05:40) (16 - 17)  SpO2: 95% (27 Jun 2018 05:40) (95% - 96%)    PHYSICAL EXAM:      Constitutional: A&Ox3    Respiratory: non labored breathing, no respiratory distress    Cardiovascular: NSR, RRR    Gastrointestinal: Soft mildly distended, tender in epigastic, no rebound or guarding. NGT in         Genitourinary: Voiding     Extremities: (-) edema                  I&O's Detail    26 Jun 2018 07:01  -  27 Jun 2018 07:00  --------------------------------------------------------  IN:    dextrose 5% + sodium chloride 0.9%: 2550 mL    IV PiggyBack: 400 mL    Oral Fluid: 600 mL    sodium chloride 0.9%.: 1400 mL    Solution: 100 mL    Solution: 50 mL    TPN (Total Parenteral Nutrition): 435 mL  Total IN: 5535 mL    OUT:    Nasoenteral Tube: 1200 mL    Voided: 600 mL  Total OUT: 1800 mL    Total NET: 3735 mL      27 Jun 2018 07:01  -  27 Jun 2018 08:09  --------------------------------------------------------  IN:    sodium chloride 0.9%.: 150 mL    TPN (Total Parenteral Nutrition): 67 mL  Total IN: 217 mL    OUT:  Total OUT: 0 mL    Total NET: 217 mL          LABS:                        11.9   15.9  )-----------( 256      ( 27 Jun 2018 07:40 )             34.5     06-27    137  |  98  |  6<L>  ----------------------------<  115<H>  3.3<L>   |  29  |  0.77    Ca    8.3<L>      27 Jun 2018 07:40  Phos  2.5     06-27  Mg     1.8     06-27    TPro  6.1  /  Alb  2.8<L>  /  TBili  1.0  /  DBili  x   /  AST  20  /  ALT  36  /  AlkPhos  79  06-27          RADIOLOGY & ADDITIONAL STUDIES:

## 2018-06-27 NOTE — PROGRESS NOTE ADULT - ASSESSMENT
58M with hx hypothyroid on synthroid and recent EGD found to have gastritis/duodenitis presents with epigastric pain, nausea vomiting found to have elevated liver tests and pancreatic enzymes with radiographic evidence of gallstones, borderline dilated CBD up to 6.8mm with gradual tapering distally at the level of the ampulla and no choledocholithiasis seen, as well as moderate peripancreatic fat stranding c/w acute pancreatitis, likely gallstone pancreatitis with no current evidence of cholangitis and improving liver tests now s/p MRI showing nonobstructing CBD stones and s/p ERCP 6/21 with stone removal and sphincterotomy now with new fevers s/p CT showing persistent pancreatitis and possible necrosis  - NPO  - continue IVF   - pain mgmt, non-opiates if possible  - monitor bilious drainage  - Replete lytes PRN  - parenteral nutrition in setting of ileus and NGT to LIWS  - encourage sitting/ambulation  - Continue care as per surgery

## 2018-06-27 NOTE — PROCEDURE NOTE - ESTIMATED BLOOD LOSS
minimal home w/ home PT/Pt is functionally at baseline level of function and not in need of skilled PT, will no longer follow

## 2018-06-27 NOTE — PROGRESS NOTE ADULT - ASSESSMENT
58M hx duodenitis/gastritis, gallstones, admitted with acute pancreatitis, suspect possible gallstone pancreatitis now s/p ERCP w/ sphincterotomy     NPO/IVF  NGT to [facility]ICKY  DVT ppx  AM labs

## 2018-06-28 LAB
ALBUMIN SERPL ELPH-MCNC: 2.5 G/DL — LOW (ref 3.3–5)
ALP SERPL-CCNC: 68 U/L — SIGNIFICANT CHANGE UP (ref 40–120)
ALT FLD-CCNC: 26 U/L — SIGNIFICANT CHANGE UP (ref 10–45)
ANION GAP SERPL CALC-SCNC: 8 MMOL/L — SIGNIFICANT CHANGE UP (ref 5–17)
ANION GAP SERPL CALC-SCNC: 9 MMOL/L — SIGNIFICANT CHANGE UP (ref 5–17)
AST SERPL-CCNC: 20 U/L — SIGNIFICANT CHANGE UP (ref 10–40)
BILIRUB SERPL-MCNC: 0.8 MG/DL — SIGNIFICANT CHANGE UP (ref 0.2–1.2)
BUN SERPL-MCNC: 6 MG/DL — LOW (ref 7–23)
BUN SERPL-MCNC: 7 MG/DL — SIGNIFICANT CHANGE UP (ref 7–23)
CALCIUM SERPL-MCNC: 8 MG/DL — LOW (ref 8.4–10.5)
CALCIUM SERPL-MCNC: 8.4 MG/DL — SIGNIFICANT CHANGE UP (ref 8.4–10.5)
CHLORIDE SERPL-SCNC: 101 MMOL/L — SIGNIFICANT CHANGE UP (ref 96–108)
CHLORIDE SERPL-SCNC: 99 MMOL/L — SIGNIFICANT CHANGE UP (ref 96–108)
CO2 SERPL-SCNC: 28 MMOL/L — SIGNIFICANT CHANGE UP (ref 22–31)
CO2 SERPL-SCNC: 29 MMOL/L — SIGNIFICANT CHANGE UP (ref 22–31)
CREAT SERPL-MCNC: 0.78 MG/DL — SIGNIFICANT CHANGE UP (ref 0.5–1.3)
CREAT SERPL-MCNC: 0.81 MG/DL — SIGNIFICANT CHANGE UP (ref 0.5–1.3)
GLUCOSE BLDC GLUCOMTR-MCNC: 102 MG/DL — HIGH (ref 70–99)
GLUCOSE BLDC GLUCOMTR-MCNC: 112 MG/DL — HIGH (ref 70–99)
GLUCOSE BLDC GLUCOMTR-MCNC: 116 MG/DL — HIGH (ref 70–99)
GLUCOSE BLDC GLUCOMTR-MCNC: 98 MG/DL — SIGNIFICANT CHANGE UP (ref 70–99)
GLUCOSE SERPL-MCNC: 107 MG/DL — HIGH (ref 70–99)
GLUCOSE SERPL-MCNC: 121 MG/DL — HIGH (ref 70–99)
HCT VFR BLD CALC: 33.4 % — LOW (ref 39–50)
HGB BLD-MCNC: 11.3 G/DL — LOW (ref 13–17)
MAGNESIUM SERPL-MCNC: 1.9 MG/DL — SIGNIFICANT CHANGE UP (ref 1.6–2.6)
MCHC RBC-ENTMCNC: 29.8 PG — SIGNIFICANT CHANGE UP (ref 27–34)
MCHC RBC-ENTMCNC: 33.8 G/DL — SIGNIFICANT CHANGE UP (ref 32–36)
MCV RBC AUTO: 88.1 FL — SIGNIFICANT CHANGE UP (ref 80–100)
PHOSPHATE SERPL-MCNC: 2.2 MG/DL — LOW (ref 2.5–4.5)
PLATELET # BLD AUTO: 259 K/UL — SIGNIFICANT CHANGE UP (ref 150–400)
POTASSIUM SERPL-MCNC: 2.9 MMOL/L — CRITICAL LOW (ref 3.5–5.3)
POTASSIUM SERPL-MCNC: 3.7 MMOL/L — SIGNIFICANT CHANGE UP (ref 3.5–5.3)
POTASSIUM SERPL-SCNC: 2.9 MMOL/L — CRITICAL LOW (ref 3.5–5.3)
POTASSIUM SERPL-SCNC: 3.7 MMOL/L — SIGNIFICANT CHANGE UP (ref 3.5–5.3)
PROT SERPL-MCNC: 5.6 G/DL — LOW (ref 6–8.3)
RBC # BLD: 3.79 M/UL — LOW (ref 4.2–5.8)
RBC # FLD: 14.8 % — SIGNIFICANT CHANGE UP (ref 10.3–16.9)
SODIUM SERPL-SCNC: 135 MMOL/L — SIGNIFICANT CHANGE UP (ref 135–145)
SODIUM SERPL-SCNC: 139 MMOL/L — SIGNIFICANT CHANGE UP (ref 135–145)
WBC # BLD: 15 K/UL — HIGH (ref 3.8–10.5)
WBC # FLD AUTO: 15 K/UL — HIGH (ref 3.8–10.5)

## 2018-06-28 PROCEDURE — 93010 ELECTROCARDIOGRAM REPORT: CPT

## 2018-06-28 PROCEDURE — 99232 SBSQ HOSP IP/OBS MODERATE 35: CPT | Mod: GC

## 2018-06-28 RX ORDER — ELECTROLYTE SOLUTION,INJ
1 VIAL (ML) INTRAVENOUS
Qty: 0 | Refills: 0 | Status: DISCONTINUED | OUTPATIENT
Start: 2018-06-28 | End: 2018-06-28

## 2018-06-28 RX ORDER — POTASSIUM PHOSPHATE, MONOBASIC POTASSIUM PHOSPHATE, DIBASIC 236; 224 MG/ML; MG/ML
15 INJECTION, SOLUTION INTRAVENOUS ONCE
Qty: 0 | Refills: 0 | Status: COMPLETED | OUTPATIENT
Start: 2018-06-28 | End: 2018-06-28

## 2018-06-28 RX ORDER — SODIUM CHLORIDE 9 MG/ML
1000 INJECTION INTRAMUSCULAR; INTRAVENOUS; SUBCUTANEOUS
Qty: 0 | Refills: 0 | Status: DISCONTINUED | OUTPATIENT
Start: 2018-06-28 | End: 2018-06-28

## 2018-06-28 RX ORDER — I.V. FAT EMULSION 20 G/100ML
0.46 EMULSION INTRAVENOUS
Qty: 50 | Refills: 0 | Status: DISCONTINUED | OUTPATIENT
Start: 2018-06-28 | End: 2018-06-28

## 2018-06-28 RX ORDER — SODIUM CHLORIDE 9 MG/ML
1000 INJECTION, SOLUTION INTRAVENOUS
Qty: 0 | Refills: 0 | Status: DISCONTINUED | OUTPATIENT
Start: 2018-06-28 | End: 2018-07-03

## 2018-06-28 RX ORDER — POTASSIUM CHLORIDE 20 MEQ
10 PACKET (EA) ORAL
Qty: 0 | Refills: 0 | Status: COMPLETED | OUTPATIENT
Start: 2018-06-28 | End: 2018-06-28

## 2018-06-28 RX ORDER — ACETAMINOPHEN 500 MG
1000 TABLET ORAL ONCE
Qty: 0 | Refills: 0 | Status: COMPLETED | OUTPATIENT
Start: 2018-06-28 | End: 2018-06-28

## 2018-06-28 RX ORDER — SODIUM CHLORIDE 9 MG/ML
1000 INJECTION INTRAMUSCULAR; INTRAVENOUS; SUBCUTANEOUS ONCE
Qty: 0 | Refills: 0 | Status: COMPLETED | OUTPATIENT
Start: 2018-06-28 | End: 2018-06-28

## 2018-06-28 RX ORDER — SODIUM CHLORIDE 9 MG/ML
1000 INJECTION, SOLUTION INTRAVENOUS
Qty: 0 | Refills: 0 | Status: DISCONTINUED | OUTPATIENT
Start: 2018-06-28 | End: 2018-06-28

## 2018-06-28 RX ADMIN — HYDROMORPHONE HYDROCHLORIDE 0.5 MILLIGRAM(S): 2 INJECTION INTRAMUSCULAR; INTRAVENOUS; SUBCUTANEOUS at 22:38

## 2018-06-28 RX ADMIN — Medication 75 MICROGRAM(S): at 22:40

## 2018-06-28 RX ADMIN — Medication 100 MILLIEQUIVALENT(S): at 09:05

## 2018-06-28 RX ADMIN — HYDROMORPHONE HYDROCHLORIDE 0.5 MILLIGRAM(S): 2 INJECTION INTRAMUSCULAR; INTRAVENOUS; SUBCUTANEOUS at 12:46

## 2018-06-28 RX ADMIN — Medication 400 MILLIGRAM(S): at 19:24

## 2018-06-28 RX ADMIN — HYDROMORPHONE HYDROCHLORIDE 0.5 MILLIGRAM(S): 2 INJECTION INTRAMUSCULAR; INTRAVENOUS; SUBCUTANEOUS at 23:00

## 2018-06-28 RX ADMIN — POTASSIUM PHOSPHATE, MONOBASIC POTASSIUM PHOSPHATE, DIBASIC 62.5 MILLIMOLE(S): 236; 224 INJECTION, SOLUTION INTRAVENOUS at 12:31

## 2018-06-28 RX ADMIN — Medication 67 EACH: at 17:16

## 2018-06-28 RX ADMIN — HYDROMORPHONE HYDROCHLORIDE 0.5 MILLIGRAM(S): 2 INJECTION INTRAMUSCULAR; INTRAVENOUS; SUBCUTANEOUS at 18:51

## 2018-06-28 RX ADMIN — HYDROMORPHONE HYDROCHLORIDE 0.5 MILLIGRAM(S): 2 INJECTION INTRAMUSCULAR; INTRAVENOUS; SUBCUTANEOUS at 01:51

## 2018-06-28 RX ADMIN — HYDROMORPHONE HYDROCHLORIDE 0.5 MILLIGRAM(S): 2 INJECTION INTRAMUSCULAR; INTRAVENOUS; SUBCUTANEOUS at 09:06

## 2018-06-28 RX ADMIN — SODIUM CHLORIDE 3603.6 MILLILITER(S): 9 INJECTION INTRAMUSCULAR; INTRAVENOUS; SUBCUTANEOUS at 05:41

## 2018-06-28 RX ADMIN — HYDROMORPHONE HYDROCHLORIDE 0.5 MILLIGRAM(S): 2 INJECTION INTRAMUSCULAR; INTRAVENOUS; SUBCUTANEOUS at 16:12

## 2018-06-28 RX ADMIN — PANTOPRAZOLE SODIUM 40 MILLIGRAM(S): 20 TABLET, DELAYED RELEASE ORAL at 09:12

## 2018-06-28 RX ADMIN — Medication 100 MILLIEQUIVALENT(S): at 08:00

## 2018-06-28 RX ADMIN — Medication 400 MILLIGRAM(S): at 10:04

## 2018-06-28 RX ADMIN — HYDROMORPHONE HYDROCHLORIDE 0.5 MILLIGRAM(S): 2 INJECTION INTRAMUSCULAR; INTRAVENOUS; SUBCUTANEOUS at 12:31

## 2018-06-28 RX ADMIN — HYDROMORPHONE HYDROCHLORIDE 0.5 MILLIGRAM(S): 2 INJECTION INTRAMUSCULAR; INTRAVENOUS; SUBCUTANEOUS at 09:20

## 2018-06-28 RX ADMIN — HEPARIN SODIUM 5000 UNIT(S): 5000 INJECTION INTRAVENOUS; SUBCUTANEOUS at 05:25

## 2018-06-28 RX ADMIN — HYDROMORPHONE HYDROCHLORIDE 0.5 MILLIGRAM(S): 2 INJECTION INTRAMUSCULAR; INTRAVENOUS; SUBCUTANEOUS at 15:57

## 2018-06-28 RX ADMIN — HYDROMORPHONE HYDROCHLORIDE 0.5 MILLIGRAM(S): 2 INJECTION INTRAMUSCULAR; INTRAVENOUS; SUBCUTANEOUS at 05:40

## 2018-06-28 RX ADMIN — Medication 100 MILLIEQUIVALENT(S): at 07:34

## 2018-06-28 RX ADMIN — HYDROMORPHONE HYDROCHLORIDE 0.5 MILLIGRAM(S): 2 INJECTION INTRAMUSCULAR; INTRAVENOUS; SUBCUTANEOUS at 02:06

## 2018-06-28 RX ADMIN — HYDROMORPHONE HYDROCHLORIDE 0.5 MILLIGRAM(S): 2 INJECTION INTRAMUSCULAR; INTRAVENOUS; SUBCUTANEOUS at 19:10

## 2018-06-28 RX ADMIN — HYDROMORPHONE HYDROCHLORIDE 0.5 MILLIGRAM(S): 2 INJECTION INTRAMUSCULAR; INTRAVENOUS; SUBCUTANEOUS at 05:25

## 2018-06-28 NOTE — CHART NOTE - NSCHARTNOTEFT_GEN_A_CORE
Admitting Diagnosis:   Patient is a 58y old  Male who presents with a chief complaint of Acute pancreatitis (18 Jun 2018 22:02)      PAST MEDICAL & SURGICAL HISTORY:  PUD (peptic ulcer disease)  No significant past surgical history      Current Nutrition Order: NPO  TPN: 200g Dex, 80g AA, 50g lipids (1500kcal, 0.9g pro/kg IBW, GIR 1.29)  PO Intake: Good (%) [   ]  Fair (50-75%) [   ] Poor (<25%) [   ] N/A    GI Issues:   C/o mild nausea, no vomiting.   Continues to have bilious output via NGT.   Reports 2 BMs.  Distended per flowsheet.     Pain: No pain reported.     Skin Integrity: WDL per flowsheet.     Labs:   06-28    139  |  101  |  7   ----------------------------<  107<H>  3.7   |  29  |  0.78    Ca    8.4      28 Jun 2018 12:51  Phos  2.2     06-28  Mg     1.9     06-28    TPro  5.6<L>  /  Alb  2.5<L>  /  TBili  0.8  /  DBili  x   /  AST  20  /  ALT  26  /  AlkPhos  68  06-28    CAPILLARY BLOOD GLUCOSE      POCT Blood Glucose.: 102 mg/dL (28 Jun 2018 12:12)  POCT Blood Glucose.: 116 mg/dL (28 Jun 2018 07:11)  POCT Blood Glucose.: 112 mg/dL (28 Jun 2018 00:06)  POCT Blood Glucose.: 105 mg/dL (27 Jun 2018 20:51)  POCT Blood Glucose.: 104 mg/dL (27 Jun 2018 19:14)      Medications:  MEDICATIONS  (STANDING):  fat emulsion (Plant Based) 20% Infusion 0.46 Gm/kG/Day (20.604 mL/Hr) IV Continuous <Continuous>  heparin  flush 100 Units/mL Injectable 100 Unit(s) IV Push every other day  heparin  Injectable 5000 Unit(s) SubCutaneous every 8 hours  insulin lispro (HumaLOG) corrective regimen sliding scale.   SubCutaneous every 6 hours  lactated ringers. 1000 milliLiter(s) (75 mL/Hr) IV Continuous <Continuous>  levothyroxine Injectable 75 MICROGram(s) IV Push at bedtime  pantoprazole  Injectable 40 milliGRAM(s) IV Push daily  Parenteral Nutrition - Adult 1 Each (67 mL/Hr) TPN Continuous <Continuous>  Parenteral Nutrition - Adult 1 Each (67 mL/Hr) TPN Continuous <Continuous>    MEDICATIONS  (PRN):  HYDROmorphone  Injectable 0.5 milliGRAM(s) IV Push every 3 hours PRN Severe Pain (7 - 10)      Weight:  6/23: 224lbs  6/18: 237lbs  Ht:6ft 2inches,IBW:190lbs+/-10%,BMI:30.2,124% of IBW.    Weight Change:   Pt appears to have had a 13lb wt loss since admission. Please taken new wt for accuracy.     Estimated energy needs:   IBW used for calculations as pt >120% of IBW   Nutrient needs based on [facility] standards of care for maintenance in adults.   2152-2583kcal/day (25-30kcal/kg)  86-103g pro/day (1-1.2g/kg)  2152-2583ml/day (25-30ml/kg)    Subjective:   57yo M w/ h/o duodenitis/gastritis, gallstones, admitted w/ acute pancreatitis, suspect possible gallstone pancreatitis now s/p ERCP w/ stone removal and sphinterotomy. Pt now now with new fevers s/p CT showing persistent pancreatitis and possible necrosis. Pt seen resting in bed. Pt seen resting in bed. Currently NPO w/ NGT to LIWS. Receiving TPN support via PICC. Ordered for 200g Dex, 80g AA, 50g lipids (1500kcal, 0.9g pro/kg IBW, GIR 1.29). Pt understanding of receving kcal/pro via PN. c/o intermittant nausea over the last 3 days. Noted wt discrepancy in EMR- pt states UBW is ~236lbs and has been weighing himself when OOBA in the halls. Most recent wt documented to be 224lbs (6/23). RD to follow to assess adequate of PN support.     Previous Nutrition Diagnosis:  inadequate oral intake RT unable to meet needs w/ NPO diet order AEB meeting 0% estimated needs x8 days.     Active [ x  ]  Resolved [   ]    If resolved, new PES: inadequate kcal/pro intake RT parenteral infusion insufficient to meet EER AEB pt meeting <75% estimated needs    Goal: Pt to meet >75% estimated needs via most appropriate route. Nutrition support initiated w/in next 24-48 hrs.     Recommendations:  1) Recommend increasing macros: 430g Dex,112g AA,50g 20% lipids (2410kcal, 1.3g pro/kg, GIR 2.77)   Day 1: 150g Dex,112g AA,50g 20% lipids   Day 2: 250g Dex,112g AA,50g 20% lipids   Day 3: 350g Dex,112g AA,50g 20% lipids   Day 4: 430g Dex,112g AA,50g 20% lipids   >> Micronutrients, lytes, fluids per MD discretion.   3) Monitor BG Q6H and replete lytes PRN.   4) monitor bowel function   5) trend weights    Education: Pt understanding of current NPO status.    Risk Level: High [  x ] Moderate [   ] Low [   ]. Admitting Diagnosis:   Patient is a 58y old  Male who presents with a chief complaint of Acute pancreatitis (18 Jun 2018 22:02)      PAST MEDICAL & SURGICAL HISTORY:  PUD (peptic ulcer disease)  No significant past surgical history      Current Nutrition Order: NPO  TPN: 200g Dex, 80g AA, 50g lipids (1500kcal, 0.9g pro/kg IBW, GIR 1.29)  PO Intake: Good (%) [   ]  Fair (50-75%) [   ] Poor (<25%) [   ] N/A    GI Issues:   C/o mild nausea, no vomiting.   Continues to have output via NGT.   Distended per flowsheet.     Pain: No pain reported.     Skin Integrity: WDL per flowsheet.     Labs:   06-28    139  |  101  |  7   ----------------------------<  107<H>  3.7   |  29  |  0.78    Ca    8.4      28 Jun 2018 12:51  Phos  2.2     06-28  Mg     1.9     06-28    TPro  5.6<L>  /  Alb  2.5<L>  /  TBili  0.8  /  DBili  x   /  AST  20  /  ALT  26  /  AlkPhos  68  06-28    CAPILLARY BLOOD GLUCOSE      POCT Blood Glucose.: 102 mg/dL (28 Jun 2018 12:12)  POCT Blood Glucose.: 116 mg/dL (28 Jun 2018 07:11)  POCT Blood Glucose.: 112 mg/dL (28 Jun 2018 00:06)  POCT Blood Glucose.: 105 mg/dL (27 Jun 2018 20:51)  POCT Blood Glucose.: 104 mg/dL (27 Jun 2018 19:14)      Medications:  MEDICATIONS  (STANDING):  fat emulsion (Plant Based) 20% Infusion 0.46 Gm/kG/Day (20.604 mL/Hr) IV Continuous <Continuous>  heparin  flush 100 Units/mL Injectable 100 Unit(s) IV Push every other day  heparin  Injectable 5000 Unit(s) SubCutaneous every 8 hours  insulin lispro (HumaLOG) corrective regimen sliding scale.   SubCutaneous every 6 hours  lactated ringers. 1000 milliLiter(s) (75 mL/Hr) IV Continuous <Continuous>  levothyroxine Injectable 75 MICROGram(s) IV Push at bedtime  pantoprazole  Injectable 40 milliGRAM(s) IV Push daily  Parenteral Nutrition - Adult 1 Each (67 mL/Hr) TPN Continuous <Continuous>  Parenteral Nutrition - Adult 1 Each (67 mL/Hr) TPN Continuous <Continuous>    MEDICATIONS  (PRN):  HYDROmorphone  Injectable 0.5 milliGRAM(s) IV Push every 3 hours PRN Severe Pain (7 - 10)      Weight:  6/23: 224lbs  6/18: 237lbs  Ht:6ft 2inches,IBW:190lbs+/-10%,BMI:30.2,124% of IBW.    Weight Change:   Pt appears to have had a 13lb wt loss since admission. Please taken new wt for accuracy.     Estimated energy needs:   IBW used for calculations as pt >120% of IBW   Nutrient needs based on [facility] standards of care for maintenance in adults.   2152-2583kcal/day (25-30kcal/kg)  86-103g pro/day (1-1.2g/kg)  2152-2583ml/day (25-30ml/kg)    Subjective:   57yo M w/ h/o duodenitis/gastritis, gallstones, admitted w/ acute pancreatitis, suspect possible gallstone pancreatitis now s/p ERCP w/ stone removal and sphinterotomy. Pt now now with new fevers s/p CT showing persistent pancreatitis and possible necrosis. Pt seen resting in bed. Pt seen resting in bed. Currently NPO w/ NGT to LIWS. Receiving TPN support via PICC. Ordered for 200g Dex, 80g AA, 50g lipids (1500kcal, 0.9g pro/kg IBW, GIR 1.29). Pt understanding of receving kcal/pro via PN. c/o intermittant nausea over the last 3 days. Noted wt discrepancy in EMR- pt states UBW is ~236lbs and has been weighing himself when OOBA in the halls. Most recent wt documented to be 224lbs (6/23). RD to follow to assess adequate of PN support.     Previous Nutrition Diagnosis:  inadequate oral intake RT unable to meet needs w/ NPO diet order AEB meeting 0% estimated needs x8 days.     Active [ x  ]  Resolved [   ]    If resolved, new PES: inadequate kcal/pro intake RT parenteral infusion insufficient to meet EER AEB pt meeting <75% estimated needs    Goal: Pt to meet >75% estimated needs via most appropriate route. Nutrition support initiated w/in next 24-48 hrs.     Recommendations:  1) Recommend increasing macros: 430g Dex,112g AA,50g 20% lipids (2410kcal, 1.3g pro/kg, GIR 2.77)   Day 1: 150g Dex,112g AA,50g 20% lipids   Day 2: 250g Dex,112g AA,50g 20% lipids   Day 3: 350g Dex,112g AA,50g 20% lipids   Day 4: 430g Dex,112g AA,50g 20% lipids   >> Micronutrients, lytes, fluids per MD discretion.   3) Monitor BG Q6H and replete lytes PRN.   4) monitor bowel function   5) trend weights    Education: Pt understanding of current NPO status.    Risk Level: High [  x ] Moderate [   ] Low [   ]. Admitting Diagnosis:   Patient is a 58y old  Male who presents with a chief complaint of Acute pancreatitis (18 Jun 2018 22:02)      PAST MEDICAL & SURGICAL HISTORY:  PUD (peptic ulcer disease)  No significant past surgical history      Current Nutrition Order: NPO  TPN: 200g Dex, 80g AA, 50g lipids (1500kcal, 0.9g pro/kg IBW, GIR 1.29)  PO Intake: Good (%) [   ]  Fair (50-75%) [   ] Poor (<25%) [   ] N/A    GI Issues:   C/o mild nausea, no vomiting.   Continues to have output via NGT.   Distended per flowsheet.     Pain: No pain reported.     Skin Integrity: WDL per flowsheet.     Labs:   06-28    139  |  101  |  7   ----------------------------<  107<H>  3.7   |  29  |  0.78    Ca    8.4      28 Jun 2018 12:51  Phos  2.2     06-28  Mg     1.9     06-28    TPro  5.6<L>  /  Alb  2.5<L>  /  TBili  0.8  /  DBili  x   /  AST  20  /  ALT  26  /  AlkPhos  68  06-28    CAPILLARY BLOOD GLUCOSE      POCT Blood Glucose.: 102 mg/dL (28 Jun 2018 12:12)  POCT Blood Glucose.: 116 mg/dL (28 Jun 2018 07:11)  POCT Blood Glucose.: 112 mg/dL (28 Jun 2018 00:06)  POCT Blood Glucose.: 105 mg/dL (27 Jun 2018 20:51)  POCT Blood Glucose.: 104 mg/dL (27 Jun 2018 19:14)      Medications:  MEDICATIONS  (STANDING):  fat emulsion (Plant Based) 20% Infusion 0.46 Gm/kG/Day (20.604 mL/Hr) IV Continuous <Continuous>  heparin  flush 100 Units/mL Injectable 100 Unit(s) IV Push every other day  heparin  Injectable 5000 Unit(s) SubCutaneous every 8 hours  insulin lispro (HumaLOG) corrective regimen sliding scale.   SubCutaneous every 6 hours  lactated ringers. 1000 milliLiter(s) (75 mL/Hr) IV Continuous <Continuous>  levothyroxine Injectable 75 MICROGram(s) IV Push at bedtime  pantoprazole  Injectable 40 milliGRAM(s) IV Push daily  Parenteral Nutrition - Adult 1 Each (67 mL/Hr) TPN Continuous <Continuous>  Parenteral Nutrition - Adult 1 Each (67 mL/Hr) TPN Continuous <Continuous>    MEDICATIONS  (PRN):  HYDROmorphone  Injectable 0.5 milliGRAM(s) IV Push every 3 hours PRN Severe Pain (7 - 10)      Weight:  6/23: 224lbs  6/18: 237lbs  Ht:6ft 2inches,IBW:190lbs+/-10%,BMI:30.2,124% of IBW.    Weight Change:   Pt appears to have had a 13lb wt loss since admission. Please taken new wt for accuracy.     Estimated energy needs:   IBW used for calculations as pt >120% of IBW   Nutrient needs based on [facility] standards of care for maintenance in adults.   2152-2583kcal/day (25-30kcal/kg)  86-103g pro/day (1-1.2g/kg)  2152-2583ml/day (25-30ml/kg)    Subjective:   59yo M w/ h/o duodenitis/gastritis, gallstones, admitted w/ acute pancreatitis, suspect possible gallstone pancreatitis now s/p ERCP w/ stone removal and sphinterotomy. Pt now now with new fevers s/p CT showing persistent pancreatitis and possible necrosis. Pt seen resting in bed. Pt seen resting in bed. Currently NPO w/ NGT to LIWS. Receiving TPN support via PICC. Ordered for 200g Dex, 80g AA, 50g lipids (1500kcal, 0.9g pro/kg IBW, GIR 1.29). Pt understanding of receving kcal/pro via PN. c/o intermittant nausea over the last 3 days. Noted wt discrepancy in EMR- pt states UBW is ~236lbs and has been weighing himself when OOBA in the halls. Most recent wt documented to be 224lbs (6/23). RD to follow to assess adequate of PN support.     Previous Nutrition Diagnosis:  inadequate oral intake RT unable to meet needs w/ NPO diet order AEB meeting 0% estimated needs x8 days.     Active [ x  ]  Resolved [   ]    If resolved, new PES: inadequate kcal/pro intake RT parenteral infusion insufficient to meet EER AEB pt meeting <75% estimated needs    Goal: Pt to meet >75% estimated needs via most appropriate route. Nutrition support initiated w/in next 24-48 hrs.     Recommendations:  1) Recommend increasing macros: 430g Dex,112g AA,50g 20% lipids (2410kcal, 1.3g pro/kg, GIR 2.77)   Day 1: 150g Dex,112g AA,50g 20% lipids   Day 2: 250g Dex,112g AA,50g 20% lipids   Day 3: 350g Dex,112g AA,50g 20% lipids   Day 4: 430g Dex,112g AA,50g 20% lipids   >> Micronutrients, lytes, fluids per MD discretion.   3) Monitor BG Q6H and replete lytes PRN.   4) monitor bowel function   5) trend weights  Recs discussed w/ team- discussed advancement of dextrose to 430 over next 2 days.    Education: Pt understanding of current NPO status.    Risk Level: High [  x ] Moderate [   ] Low [   ].

## 2018-06-28 NOTE — PROGRESS NOTE ADULT - ASSESSMENT
58M with hx hypothyroid on synthroid and recent EGD found to have gastritis/duodenitis presents with epigastric pain, nausea vomiting found to have elevated liver tests and pancreatic enzymes with radiographic evidence of gallstones, borderline dilated CBD up to 6.8mm with gradual tapering distally at the level of the ampulla and no choledocholithiasis seen, as well as moderate peripancreatic fat stranding c/w acute pancreatitis, likely gallstone pancreatitis with no current evidence of cholangitis and improving liver tests now s/p MRI showing nonobstructing CBD stones and s/p ERCP 6/21 with stone removal and sphincterotomy now with new fevers s/p CT showing persistent pancreatitis and possible necrosis  - NPO  - continue IVF   - pain mgmt, non-opiates if possible  - monitor bilious drainage  - Replete lytes PRN  - parenteral nutrition in setting of ileus and NGT to LIWS  - encourage sitting/ambulation  - consider CT guided FNA of the necrosis to evaluate for infection. Infection can occur early in the course of necrotizing pancreatitis, but it is more often seen late in the clinical course (after 10 days)  - Continue care as per surgery

## 2018-06-28 NOTE — PROGRESS NOTE ADULT - SUBJECTIVE AND OBJECTIVE BOX
ovn: epigastric tenderness unchanged in quality or intensity. Fever of 101.1, responded to IV tyl.    58M hx duodenitis/gastritis, gallstones, admitted with acute pancreatitis, suspect possible gallstone pancreatitis now s/p ERCP w/ sphincterotomy   NPO/IVF/TPN  NGT  OOBA/IS  SCDs/SQH  AM labs ovn: epigastric tenderness unchanged in quality or intensity. Fever of 101.1, responded to IV tyl      SUBJECTIVE:  Patient seen and examined bedside by chief resident.    heparin  flush 100 Units/mL Injectable 100 Unit(s) IV Push every other day  heparin  Injectable 5000 Unit(s) SubCutaneous every 8 hours      Vital Signs Last 24 Hrs  T(C): 37.3 (28 Jun 2018 08:15), Max: 38.4 (27 Jun 2018 20:00)  T(F): 99.1 (28 Jun 2018 08:15), Max: 101.1 (27 Jun 2018 20:00)  HR: 71 (28 Jun 2018 08:15) (64 - 71)  BP: 165/92 (28 Jun 2018 08:15) (132/75 - 165/101)  BP(mean): --  RR: 16 (28 Jun 2018 08:15) (16 - 64)  SpO2: 96% (28 Jun 2018 08:15) (96% - 97%)  I&O's Detail    27 Jun 2018 07:01  -  28 Jun 2018 07:00  --------------------------------------------------------  IN:    sodium chloride 0.9%: 1800 mL    Sodium Chloride 0.9% IV Bolus: 1000 mL    TPN (Total Parenteral Nutrition): 804 mL  Total IN: 3604 mL    OUT:    Nasoenteral Tube: 2065 mL    Voided: 1800 mL  Total OUT: 3865 mL    Total NET: -261 mL      28 Jun 2018 07:01  -  28 Jun 2018 09:43  --------------------------------------------------------  IN:  Total IN: 0 mL    OUT:    Voided: 400 mL  Total OUT: 400 mL    Total NET: -400 mL          General: NAD, resting comfortably in bed  C/V: NSR  Pulm: Nonlabored breathing, no respiratory distress  Abd: soft, NT/ND, NGT with continued  high output  Extrem: WWP, no edema, SCDs in place        LABS:                        11.3   15.0  )-----------( 259      ( 28 Jun 2018 06:39 )             33.4     06-28    135  |  99  |  6<L>  ----------------------------<  121<H>  2.9<LL>   |  28  |  0.81    Ca    8.0<L>      28 Jun 2018 06:38  Phos  2.2     06-28  Mg     1.9     06-28    TPro  5.6<L>  /  Alb  2.5<L>  /  TBili  0.8  /  DBili  x   /  AST  20  /  ALT  26  /  AlkPhos  68  06-28

## 2018-06-28 NOTE — PROGRESS NOTE ADULT - SUBJECTIVE AND OBJECTIVE BOX
Pt seen and examined.  Tmax 101, 9/10 pain in the entire abdomen. Passing flatus, 8-10x bowel movements last 24 hrs.       MEDICATIONS:  MEDICATIONS  (STANDING):  fat emulsion (Plant Based) 20% Infusion 0.46 Gm/kG/Day (20.604 mL/Hr) IV Continuous <Continuous>  heparin  flush 100 Units/mL Injectable 100 Unit(s) IV Push every other day  heparin  Injectable 5000 Unit(s) SubCutaneous every 8 hours  insulin lispro (HumaLOG) corrective regimen sliding scale.   SubCutaneous every 6 hours  lactated ringers. 1000 milliLiter(s) (75 mL/Hr) IV Continuous <Continuous>  levothyroxine Injectable 75 MICROGram(s) IV Push at bedtime  pantoprazole  Injectable 40 milliGRAM(s) IV Push daily  Parenteral Nutrition - Adult 1 Each (67 mL/Hr) TPN Continuous <Continuous>  Parenteral Nutrition - Adult 1 Each (67 mL/Hr) TPN Continuous <Continuous>    MEDICATIONS  (PRN):  HYDROmorphone  Injectable 0.5 milliGRAM(s) IV Push every 3 hours PRN Severe Pain (7 - 10)      Allergies    No Known Allergies    Intolerances        Vital Signs Last 24 Hrs  T(C): 36.7 (28 Jun 2018 12:08), Max: 38.4 (27 Jun 2018 20:00)  T(F): 98 (28 Jun 2018 12:08), Max: 101.1 (27 Jun 2018 20:00)  HR: 60 (28 Jun 2018 12:08) (60 - 71)  BP: 136/79 (28 Jun 2018 12:08) (136/79 - 165/101)  BP(mean): --  RR: 17 (28 Jun 2018 12:08) (16 - 18)  SpO2: 97% (28 Jun 2018 12:08) (96% - 97%)    06-27 @ 07:01  -  06-28 @ 07:00  --------------------------------------------------------  IN: 3604 mL / OUT: 3865 mL / NET: -261 mL    06-28 @ 07:01 - 06-28 @ 15:06  --------------------------------------------------------  IN: 2310.4 mL / OUT: 500 mL / NET: 1810.4 mL        PHYSICAL EXAM:    General: Well developed; well nourished; in no acute distress  HEENT: MMM, conjunctiva and sclera clear  Gastrointestinal: Soft non-tender mildly distended; Normal bowel sounds; No hepatosplenomegaly  Skin: Warm and dry. No obvious rash    LABS:      CBC Full  -  ( 28 Jun 2018 06:39 )  WBC Count : 15.0 K/uL  Hemoglobin : 11.3 g/dL  Hematocrit : 33.4 %  Platelet Count - Automated : 259 K/uL  Mean Cell Volume : 88.1 fL  Mean Cell Hemoglobin : 29.8 pg  Mean Cell Hemoglobin Concentration : 33.8 g/dL  Auto Neutrophil # : x  Auto Lymphocyte # : x  Auto Monocyte # : x  Auto Eosinophil # : x  Auto Basophil # : x  Auto Neutrophil % : x  Auto Lymphocyte % : x  Auto Monocyte % : x  Auto Eosinophil % : x  Auto Basophil % : x    06-28    139  |  101  |  7   ----------------------------<  107<H>  3.7   |  29  |  0.78    Ca    8.4      28 Jun 2018 12:51  Phos  2.2     06-28  Mg     1.9     06-28    TPro  5.6<L>  /  Alb  2.5<L>  /  TBili  0.8  /  DBili  x   /  AST  20  /  ALT  26  /  AlkPhos  68  06-28                      RADIOLOGY & ADDITIONAL STUDIES (The following images were personally reviewed):

## 2018-06-28 NOTE — PROGRESS NOTE ADULT - ASSESSMENT
58M hx duodenitis/gastritis, gallstones, admitted with acute pancreatitis, suspect possible gallstone pancreatitis now s/p ERCP w/ sphincterotomy   NPO/IVF/TPN  NGT  OOBA/IS  SCDs/SQH  AM labs

## 2018-06-29 LAB
ALBUMIN SERPL ELPH-MCNC: 2.8 G/DL — LOW (ref 3.3–5)
ALP SERPL-CCNC: 76 U/L — SIGNIFICANT CHANGE UP (ref 40–120)
ALT FLD-CCNC: 28 U/L — SIGNIFICANT CHANGE UP (ref 10–45)
ANION GAP SERPL CALC-SCNC: 14 MMOL/L — SIGNIFICANT CHANGE UP (ref 5–17)
AST SERPL-CCNC: 25 U/L — SIGNIFICANT CHANGE UP (ref 10–40)
BILIRUB SERPL-MCNC: 1 MG/DL — SIGNIFICANT CHANGE UP (ref 0.2–1.2)
BUN SERPL-MCNC: 8 MG/DL — SIGNIFICANT CHANGE UP (ref 7–23)
CALCIUM SERPL-MCNC: 8.8 MG/DL — SIGNIFICANT CHANGE UP (ref 8.4–10.5)
CHLORIDE SERPL-SCNC: 98 MMOL/L — SIGNIFICANT CHANGE UP (ref 96–108)
CO2 SERPL-SCNC: 25 MMOL/L — SIGNIFICANT CHANGE UP (ref 22–31)
CREAT SERPL-MCNC: 0.71 MG/DL — SIGNIFICANT CHANGE UP (ref 0.5–1.3)
GLUCOSE BLDC GLUCOMTR-MCNC: 101 MG/DL — HIGH (ref 70–99)
GLUCOSE BLDC GLUCOMTR-MCNC: 114 MG/DL — HIGH (ref 70–99)
GLUCOSE BLDC GLUCOMTR-MCNC: 124 MG/DL — HIGH (ref 70–99)
GLUCOSE BLDC GLUCOMTR-MCNC: 96 MG/DL — SIGNIFICANT CHANGE UP (ref 70–99)
GLUCOSE BLDC GLUCOMTR-MCNC: 96 MG/DL — SIGNIFICANT CHANGE UP (ref 70–99)
GLUCOSE BLDC GLUCOMTR-MCNC: 99 MG/DL — SIGNIFICANT CHANGE UP (ref 70–99)
GLUCOSE SERPL-MCNC: 126 MG/DL — HIGH (ref 70–99)
HCT VFR BLD CALC: 34.3 % — LOW (ref 39–50)
HGB BLD-MCNC: 11.6 G/DL — LOW (ref 13–17)
MAGNESIUM SERPL-MCNC: 1.9 MG/DL — SIGNIFICANT CHANGE UP (ref 1.6–2.6)
MCHC RBC-ENTMCNC: 29 PG — SIGNIFICANT CHANGE UP (ref 27–34)
MCHC RBC-ENTMCNC: 33.8 G/DL — SIGNIFICANT CHANGE UP (ref 32–36)
MCV RBC AUTO: 85.8 FL — SIGNIFICANT CHANGE UP (ref 80–100)
PHOSPHATE SERPL-MCNC: 2.9 MG/DL — SIGNIFICANT CHANGE UP (ref 2.5–4.5)
PLATELET # BLD AUTO: 337 K/UL — SIGNIFICANT CHANGE UP (ref 150–400)
POTASSIUM SERPL-MCNC: 3.8 MMOL/L — SIGNIFICANT CHANGE UP (ref 3.5–5.3)
POTASSIUM SERPL-SCNC: 3.8 MMOL/L — SIGNIFICANT CHANGE UP (ref 3.5–5.3)
PROT SERPL-MCNC: 6.5 G/DL — SIGNIFICANT CHANGE UP (ref 6–8.3)
RBC # BLD: 4 M/UL — LOW (ref 4.2–5.8)
RBC # FLD: 14.9 % — SIGNIFICANT CHANGE UP (ref 10.3–16.9)
SODIUM SERPL-SCNC: 137 MMOL/L — SIGNIFICANT CHANGE UP (ref 135–145)
WBC # BLD: 16.1 K/UL — HIGH (ref 3.8–10.5)
WBC # FLD AUTO: 16.1 K/UL — HIGH (ref 3.8–10.5)

## 2018-06-29 PROCEDURE — 99232 SBSQ HOSP IP/OBS MODERATE 35: CPT | Mod: GC

## 2018-06-29 PROCEDURE — 74177 CT ABD & PELVIS W/CONTRAST: CPT | Mod: 26

## 2018-06-29 RX ORDER — LIDOCAINE HCL 20 MG/ML
5 VIAL (ML) INJECTION ONCE
Qty: 0 | Refills: 0 | Status: COMPLETED | OUTPATIENT
Start: 2018-06-29 | End: 2018-06-29

## 2018-06-29 RX ORDER — IOHEXOL 300 MG/ML
30 INJECTION, SOLUTION INTRAVENOUS ONCE
Qty: 0 | Refills: 0 | Status: COMPLETED | OUTPATIENT
Start: 2018-06-29 | End: 2018-06-29

## 2018-06-29 RX ORDER — ELECTROLYTE SOLUTION,INJ
1 VIAL (ML) INTRAVENOUS
Qty: 0 | Refills: 0 | Status: DISCONTINUED | OUTPATIENT
Start: 2018-06-29 | End: 2018-06-29

## 2018-06-29 RX ORDER — ACETAMINOPHEN 500 MG
1000 TABLET ORAL ONCE
Qty: 0 | Refills: 0 | Status: COMPLETED | OUTPATIENT
Start: 2018-06-29 | End: 2018-06-29

## 2018-06-29 RX ORDER — LIDOCAINE HCL 20 MG/ML
5 VIAL (ML) INJECTION ONCE
Qty: 0 | Refills: 0 | Status: COMPLETED | OUTPATIENT
Start: 2018-06-29 | End: 2018-06-30

## 2018-06-29 RX ADMIN — Medication 75 MICROGRAM(S): at 22:35

## 2018-06-29 RX ADMIN — HYDROMORPHONE HYDROCHLORIDE 0.5 MILLIGRAM(S): 2 INJECTION INTRAMUSCULAR; INTRAVENOUS; SUBCUTANEOUS at 13:10

## 2018-06-29 RX ADMIN — HYDROMORPHONE HYDROCHLORIDE 0.5 MILLIGRAM(S): 2 INJECTION INTRAMUSCULAR; INTRAVENOUS; SUBCUTANEOUS at 06:06

## 2018-06-29 RX ADMIN — HYDROMORPHONE HYDROCHLORIDE 0.5 MILLIGRAM(S): 2 INJECTION INTRAMUSCULAR; INTRAVENOUS; SUBCUTANEOUS at 22:36

## 2018-06-29 RX ADMIN — HYDROMORPHONE HYDROCHLORIDE 0.5 MILLIGRAM(S): 2 INJECTION INTRAMUSCULAR; INTRAVENOUS; SUBCUTANEOUS at 19:24

## 2018-06-29 RX ADMIN — HEPARIN SODIUM 5000 UNIT(S): 5000 INJECTION INTRAVENOUS; SUBCUTANEOUS at 13:00

## 2018-06-29 RX ADMIN — HYDROMORPHONE HYDROCHLORIDE 0.5 MILLIGRAM(S): 2 INJECTION INTRAMUSCULAR; INTRAVENOUS; SUBCUTANEOUS at 15:58

## 2018-06-29 RX ADMIN — Medication 400 MILLIGRAM(S): at 17:48

## 2018-06-29 RX ADMIN — HYDROMORPHONE HYDROCHLORIDE 0.5 MILLIGRAM(S): 2 INJECTION INTRAMUSCULAR; INTRAVENOUS; SUBCUTANEOUS at 12:59

## 2018-06-29 RX ADMIN — HEPARIN SODIUM 5000 UNIT(S): 5000 INJECTION INTRAVENOUS; SUBCUTANEOUS at 22:34

## 2018-06-29 RX ADMIN — IOHEXOL 30 MILLILITER(S): 300 INJECTION, SOLUTION INTRAVENOUS at 13:00

## 2018-06-29 RX ADMIN — HYDROMORPHONE HYDROCHLORIDE 0.5 MILLIGRAM(S): 2 INJECTION INTRAMUSCULAR; INTRAVENOUS; SUBCUTANEOUS at 10:05

## 2018-06-29 RX ADMIN — Medication 1 EACH: at 17:40

## 2018-06-29 RX ADMIN — HYDROMORPHONE HYDROCHLORIDE 0.5 MILLIGRAM(S): 2 INJECTION INTRAMUSCULAR; INTRAVENOUS; SUBCUTANEOUS at 19:36

## 2018-06-29 RX ADMIN — HYDROMORPHONE HYDROCHLORIDE 0.5 MILLIGRAM(S): 2 INJECTION INTRAMUSCULAR; INTRAVENOUS; SUBCUTANEOUS at 09:54

## 2018-06-29 RX ADMIN — PANTOPRAZOLE SODIUM 40 MILLIGRAM(S): 20 TABLET, DELAYED RELEASE ORAL at 12:59

## 2018-06-29 RX ADMIN — HYDROMORPHONE HYDROCHLORIDE 0.5 MILLIGRAM(S): 2 INJECTION INTRAMUSCULAR; INTRAVENOUS; SUBCUTANEOUS at 06:21

## 2018-06-29 RX ADMIN — HYDROMORPHONE HYDROCHLORIDE 0.5 MILLIGRAM(S): 2 INJECTION INTRAMUSCULAR; INTRAVENOUS; SUBCUTANEOUS at 16:38

## 2018-06-29 RX ADMIN — HYDROMORPHONE HYDROCHLORIDE 0.5 MILLIGRAM(S): 2 INJECTION INTRAMUSCULAR; INTRAVENOUS; SUBCUTANEOUS at 22:59

## 2018-06-29 NOTE — PROGRESS NOTE ADULT - ASSESSMENT
58M hx duodenitis/gastritis, gallstones, admitted with acute pancreatitis, suspect possible gallstone pancreatitis now s/p ERCP w/ sphincterotomy   NPO/IVF/TPN  NGT  OOBA/IS  SCDs/SQH  AM labs  CT

## 2018-06-29 NOTE — PROGRESS NOTE ADULT - SUBJECTIVE AND OBJECTIVE BOX
ovn: 500 mL NGT output during 12 hr daytime shift. Decreased epigastric tenderness. Multiple BM during the day.  ovn, 600 in 24hrs.    58M hx duodenitis/gastritis, gallstones, admitted with acute pancreatitis, suspect possible gallstone pancreatitis now s/p ERCP w/ sphincterotomy   NPO/IVF/TPN  NGT  OOBA/IS  SCDs/SQH  AM labs ovn: 500 mL NGT output during 12 hr daytime shift. Decreased epigastric tenderness. Multiple BM during the day.  ovn, 600 in 24hrs.    SUBJECTIVE: HARSHA. Decreased NGT ovn. Denies n/v.    Vital Signs Last 24 Hrs  T(C): 38 (29 Jun 2018 16:45), Max: 38.4 (28 Jun 2018 19:10)  T(F): 100.4 (29 Jun 2018 16:45), Max: 101.1 (28 Jun 2018 19:10)  HR: 67 (29 Jun 2018 16:45) (63 - 76)  BP: 151/83 (29 Jun 2018 16:45) (143/91 - 166/93)  BP(mean): --  RR: 18 (29 Jun 2018 16:45) (15 - 18)  SpO2: 95% (29 Jun 2018 16:45) (95% - 96%)    General: NAD, resting comfortably in bed  Pulm: Nonlabored breathing, no respiratory distress  Abd: soft, NT, obese    LABS:                        11.6   16.1  )-----------( 337      ( 29 Jun 2018 07:15 )             34.3     06-29    137  |  98  |  8   ----------------------------<  126<H>  3.8   |  25  |  0.71    Ca    8.8      29 Jun 2018 07:11  Phos  2.9     06-29  Mg     1.9     06-29    TPro  6.5  /  Alb  2.8<L>  /  TBili  1.0  /  DBili  x   /  AST  25  /  ALT  28  /  AlkPhos  76  06-29

## 2018-06-29 NOTE — PROGRESS NOTE ADULT - SUBJECTIVE AND OBJECTIVE BOX
Pt seen and examined. Tmax 101.1 in last 24hrs. Decreased NGT output. Continues to have abdominal pain.        MEDICATIONS:  MEDICATIONS  (STANDING):  heparin  flush 100 Units/mL Injectable 100 Unit(s) IV Push every other day  heparin  Injectable 5000 Unit(s) SubCutaneous every 8 hours  insulin lispro (HumaLOG) corrective regimen sliding scale.   SubCutaneous every 6 hours  lactated ringers. 1000 milliLiter(s) (75 mL/Hr) IV Continuous <Continuous>  levothyroxine Injectable 75 MICROGram(s) IV Push at bedtime  pantoprazole  Injectable 40 milliGRAM(s) IV Push daily  Parenteral Nutrition - Adult 1 Each (67 mL/Hr) TPN Continuous <Continuous>  Parenteral Nutrition - Adult 1 Each (67 mL/Hr) TPN Continuous <Continuous>    MEDICATIONS  (PRN):  HYDROmorphone  Injectable 0.5 milliGRAM(s) IV Push every 3 hours PRN Severe Pain (7 - 10)      Allergies    No Known Allergies    Intolerances        Vital Signs Last 24 Hrs  T(C): 37.2 (29 Jun 2018 08:07), Max: 38.4 (28 Jun 2018 19:10)  T(F): 99 (29 Jun 2018 08:07), Max: 101.1 (28 Jun 2018 19:10)  HR: 72 (29 Jun 2018 08:07) (63 - 76)  BP: 145/87 (29 Jun 2018 08:07) (145/87 - 166/93)  BP(mean): --  RR: 15 (29 Jun 2018 08:07) (15 - 17)  SpO2: 95% (29 Jun 2018 08:07) (95% - 98%)    06-28 @ 07:01 - 06-29 @ 07:00  --------------------------------------------------------  IN: 4824.4 mL / OUT: 2400 mL / NET: 2424.4 mL    06-29 @ 07:01 - 06-29 @ 13:34  --------------------------------------------------------  IN: 284 mL / OUT: 300 mL / NET: -16 mL        PHYSICAL EXAM:    General: Well developed; well nourished; in no acute distress  HEENT: MMM, conjunctiva and sclera clear  Gastrointestinal: Soft moderately distended NT (after dilaudid)  Skin: Warm and dry. No obvious rash    LABS:      CBC Full  -  ( 29 Jun 2018 07:15 )  WBC Count : 16.1 K/uL  Hemoglobin : 11.6 g/dL  Hematocrit : 34.3 %  Platelet Count - Automated : 337 K/uL  Mean Cell Volume : 85.8 fL  Mean Cell Hemoglobin : 29.0 pg  Mean Cell Hemoglobin Concentration : 33.8 g/dL  Auto Neutrophil # : x  Auto Lymphocyte # : x  Auto Monocyte # : x  Auto Eosinophil # : x  Auto Basophil # : x  Auto Neutrophil % : x  Auto Lymphocyte % : x  Auto Monocyte % : x  Auto Eosinophil % : x  Auto Basophil % : x    06-29    137  |  98  |  8   ----------------------------<  126<H>  3.8   |  25  |  0.71    Ca    8.8      29 Jun 2018 07:11  Phos  2.9     06-29  Mg     1.9     06-29    TPro  6.5  /  Alb  2.8<L>  /  TBili  1.0  /  DBili  x   /  AST  25  /  ALT  28  /  AlkPhos  76  06-29                      RADIOLOGY & ADDITIONAL STUDIES (The following images were personally reviewed):

## 2018-06-30 LAB
ALBUMIN SERPL ELPH-MCNC: 2.6 G/DL — LOW (ref 3.3–5)
ALP SERPL-CCNC: 79 U/L — SIGNIFICANT CHANGE UP (ref 40–120)
ALT FLD-CCNC: 31 U/L — SIGNIFICANT CHANGE UP (ref 10–45)
ANION GAP SERPL CALC-SCNC: 11 MMOL/L — SIGNIFICANT CHANGE UP (ref 5–17)
AST SERPL-CCNC: 42 U/L — HIGH (ref 10–40)
BILIRUB SERPL-MCNC: 1.1 MG/DL — SIGNIFICANT CHANGE UP (ref 0.2–1.2)
BUN SERPL-MCNC: 12 MG/DL — SIGNIFICANT CHANGE UP (ref 7–23)
CALCIUM SERPL-MCNC: 8.3 MG/DL — LOW (ref 8.4–10.5)
CHLORIDE SERPL-SCNC: 96 MMOL/L — SIGNIFICANT CHANGE UP (ref 96–108)
CO2 SERPL-SCNC: 26 MMOL/L — SIGNIFICANT CHANGE UP (ref 22–31)
CREAT SERPL-MCNC: 0.79 MG/DL — SIGNIFICANT CHANGE UP (ref 0.5–1.3)
GLUCOSE BLDC GLUCOMTR-MCNC: 104 MG/DL — HIGH (ref 70–99)
GLUCOSE BLDC GLUCOMTR-MCNC: 111 MG/DL — HIGH (ref 70–99)
GLUCOSE BLDC GLUCOMTR-MCNC: 91 MG/DL — SIGNIFICANT CHANGE UP (ref 70–99)
GLUCOSE BLDC GLUCOMTR-MCNC: 99 MG/DL — SIGNIFICANT CHANGE UP (ref 70–99)
GLUCOSE SERPL-MCNC: 110 MG/DL — HIGH (ref 70–99)
HCT VFR BLD CALC: 34.4 % — LOW (ref 39–50)
HGB BLD-MCNC: 11.5 G/DL — LOW (ref 13–17)
MAGNESIUM SERPL-MCNC: 2 MG/DL — SIGNIFICANT CHANGE UP (ref 1.6–2.6)
MCHC RBC-ENTMCNC: 29.3 PG — SIGNIFICANT CHANGE UP (ref 27–34)
MCHC RBC-ENTMCNC: 33.4 G/DL — SIGNIFICANT CHANGE UP (ref 32–36)
MCV RBC AUTO: 87.8 FL — SIGNIFICANT CHANGE UP (ref 80–100)
PHOSPHATE SERPL-MCNC: 3.3 MG/DL — SIGNIFICANT CHANGE UP (ref 2.5–4.5)
PLATELET # BLD AUTO: 405 K/UL — HIGH (ref 150–400)
POTASSIUM SERPL-MCNC: 4.6 MMOL/L — SIGNIFICANT CHANGE UP (ref 3.5–5.3)
POTASSIUM SERPL-SCNC: 4.6 MMOL/L — SIGNIFICANT CHANGE UP (ref 3.5–5.3)
PROT SERPL-MCNC: 6.6 G/DL — SIGNIFICANT CHANGE UP (ref 6–8.3)
RBC # BLD: 3.92 M/UL — LOW (ref 4.2–5.8)
RBC # FLD: 14.8 % — SIGNIFICANT CHANGE UP (ref 10.3–16.9)
SODIUM SERPL-SCNC: 133 MMOL/L — LOW (ref 135–145)
WBC # BLD: 14.7 K/UL — HIGH (ref 3.8–10.5)
WBC # FLD AUTO: 14.7 K/UL — HIGH (ref 3.8–10.5)

## 2018-06-30 RX ORDER — I.V. FAT EMULSION 20 G/100ML
0.47 EMULSION INTRAVENOUS
Qty: 50 | Refills: 0 | Status: DISCONTINUED | OUTPATIENT
Start: 2018-06-30 | End: 2018-06-30

## 2018-06-30 RX ORDER — HYDROMORPHONE HYDROCHLORIDE 2 MG/ML
0.5 INJECTION INTRAMUSCULAR; INTRAVENOUS; SUBCUTANEOUS
Qty: 0 | Refills: 0 | Status: DISCONTINUED | OUTPATIENT
Start: 2018-06-30 | End: 2018-07-06

## 2018-06-30 RX ORDER — ELECTROLYTE SOLUTION,INJ
1 VIAL (ML) INTRAVENOUS
Qty: 0 | Refills: 0 | Status: DISCONTINUED | OUTPATIENT
Start: 2018-06-30 | End: 2018-06-30

## 2018-06-30 RX ADMIN — HYDROMORPHONE HYDROCHLORIDE 0.5 MILLIGRAM(S): 2 INJECTION INTRAMUSCULAR; INTRAVENOUS; SUBCUTANEOUS at 22:36

## 2018-06-30 RX ADMIN — Medication 1 EACH: at 17:36

## 2018-06-30 RX ADMIN — HYDROMORPHONE HYDROCHLORIDE 0.5 MILLIGRAM(S): 2 INJECTION INTRAMUSCULAR; INTRAVENOUS; SUBCUTANEOUS at 16:02

## 2018-06-30 RX ADMIN — HYDROMORPHONE HYDROCHLORIDE 0.5 MILLIGRAM(S): 2 INJECTION INTRAMUSCULAR; INTRAVENOUS; SUBCUTANEOUS at 10:00

## 2018-06-30 RX ADMIN — HYDROMORPHONE HYDROCHLORIDE 0.5 MILLIGRAM(S): 2 INJECTION INTRAMUSCULAR; INTRAVENOUS; SUBCUTANEOUS at 09:53

## 2018-06-30 RX ADMIN — HYDROMORPHONE HYDROCHLORIDE 0.5 MILLIGRAM(S): 2 INJECTION INTRAMUSCULAR; INTRAVENOUS; SUBCUTANEOUS at 16:15

## 2018-06-30 RX ADMIN — HYDROMORPHONE HYDROCHLORIDE 0.5 MILLIGRAM(S): 2 INJECTION INTRAMUSCULAR; INTRAVENOUS; SUBCUTANEOUS at 06:05

## 2018-06-30 RX ADMIN — Medication 75 MICROGRAM(S): at 22:35

## 2018-06-30 RX ADMIN — SODIUM CHLORIDE 75 MILLILITER(S): 9 INJECTION, SOLUTION INTRAVENOUS at 22:36

## 2018-06-30 RX ADMIN — HYDROMORPHONE HYDROCHLORIDE 0.5 MILLIGRAM(S): 2 INJECTION INTRAMUSCULAR; INTRAVENOUS; SUBCUTANEOUS at 19:25

## 2018-06-30 RX ADMIN — HEPARIN SODIUM 5000 UNIT(S): 5000 INJECTION INTRAVENOUS; SUBCUTANEOUS at 22:35

## 2018-06-30 RX ADMIN — HYDROMORPHONE HYDROCHLORIDE 0.5 MILLIGRAM(S): 2 INJECTION INTRAMUSCULAR; INTRAVENOUS; SUBCUTANEOUS at 13:15

## 2018-06-30 RX ADMIN — HYDROMORPHONE HYDROCHLORIDE 0.5 MILLIGRAM(S): 2 INJECTION INTRAMUSCULAR; INTRAVENOUS; SUBCUTANEOUS at 05:48

## 2018-06-30 RX ADMIN — I.V. FAT EMULSION 20.87 GM/KG/DAY: 20 EMULSION INTRAVENOUS at 22:35

## 2018-06-30 RX ADMIN — PANTOPRAZOLE SODIUM 40 MILLIGRAM(S): 20 TABLET, DELAYED RELEASE ORAL at 11:31

## 2018-06-30 RX ADMIN — HYDROMORPHONE HYDROCHLORIDE 0.5 MILLIGRAM(S): 2 INJECTION INTRAMUSCULAR; INTRAVENOUS; SUBCUTANEOUS at 13:02

## 2018-06-30 RX ADMIN — HYDROMORPHONE HYDROCHLORIDE 0.5 MILLIGRAM(S): 2 INJECTION INTRAMUSCULAR; INTRAVENOUS; SUBCUTANEOUS at 23:21

## 2018-06-30 NOTE — PROGRESS NOTE ADULT - SUBJECTIVE AND OBJECTIVE BOX
On interval followup, the left arm PICC site is clean, dry, non-inflamed and non-tender.  T 99 range. Notes, cultures and studies are followed.

## 2018-06-30 NOTE — PROGRESS NOTE ADULT - ASSESSMENT
58M with hx hypothyroid on synthroid and recent EGD found to have gastritis/duodenitis presents with epigastric pain, nausea vomiting found to have elevated liver tests and pancreatic enzymes with radiographic evidence of gallstones, borderline dilated CBD up to 6.8mm with gradual tapering distally at the level of the ampulla and no choledocholithiasis seen, as well as moderate peripancreatic fat stranding c/w acute pancreatitis, likely gallstone pancreatitis with no current evidence of cholangitis and improving liver tests now s/p MRI showing nonobstructing CBD stones and s/p ERCP 6/21 with stone removal and sphincterotomy now with new fevers s/p CT showing persistent pancreatitis and possible necrosis  - NPO  - continue IVF   - pain mgmt, non-opiates if possible  - Replete lytes PRN  - parenteral nutrition in setting of ileus, however, would consider enteral nutrition with clear liquid diet vs post-pyloric tube feeds @ 10 cc/hr  - encourage sitting/ambulation  - Fever curve downtrending and leukocytosis resolving, if recurrent evidence of SIRS would consider CT guided FNA to evaluate for infectious necrotizing pancreatitis  - Continue care as per surgery

## 2018-06-30 NOTE — CHART NOTE - NSCHARTNOTEFT_GEN_A_CORE
Patient examined at bedside. Patient was resting without acute distress. He denies nausea or emesis, but reports mild 2/10 pain in LLQ. He had 1 further BM (loose consistency).     Patient's abdomen is distended, but unchanged from last physical exam. Mild epigastric and LLQ tenderness, otherwise nontender. No guarding or rebound.     Patient continuing to refuse NGT at this time due to lack of nausea or emesis, but is amenable to NGT placement should he have emesis and/or increased nausea.

## 2018-06-30 NOTE — PROGRESS NOTE ADULT - SUBJECTIVE AND OBJECTIVE BOX
ovn: NGT removed by patient. Prior, NGT output was 50 mL during the daytime. Vitals are stable. Patient's abdomen is distended, but unchanged over past several days, but soft. Mild epigastric tenderness, unchanged from AM; otherwise nontender. No guarding or rebound. CT reviewed with radiologist: no significant changes from prior CT. CT shows peripancreatic fat stranding (inflammation), but no fluid collection or signs of necrosis. Loops of bowel remain dilated compatred to prior CT. BMx3     58M hx duodenitis/gastritis, gallstones, admitted with acute pancreatitis, suspect possible gallstone pancreatitis now s/p ERCP w/ sphincterotomy   NPO/IVF/TPN  NGT  OOBA/IS  SCDs/SQH  AM labs SUBJECTIVE: Patient removed NGT ovn. BMx3.      Vital Signs Last 24 Hrs  T(C): 37.4 (30 Jun 2018 05:00), Max: 38 (29 Jun 2018 16:45)  T(F): 99.3 (30 Jun 2018 05:00), Max: 100.4 (29 Jun 2018 16:45)  HR: 76 (30 Jun 2018 05:00) (58 - 76)  BP: 166/93 (30 Jun 2018 05:00) (132/61 - 166/93)  BP(mean): --  RR: 18 (30 Jun 2018 05:00) (17 - 18)  SpO2: 95% (30 Jun 2018 05:00) (95% - 98%)    General: NAD, resting comfortably in bed  Pulm: Nonlabored breathing, no respiratory distress  Abd: softly distended, NT    LABS:                        11.6   16.1  )-----------( 337      ( 29 Jun 2018 07:15 )             34.3     06-29    137  |  98  |  8   ----------------------------<  126<H>  3.8   |  25  |  0.71    Ca    8.8      29 Jun 2018 07:11  Phos  2.9     06-29  Mg     1.9     06-29    TPro  6.5  /  Alb  2.8<L>  /  TBili  1.0  /  DBili  x   /  AST  25  /  ALT  28  /  AlkPhos  76  06-29

## 2018-06-30 NOTE — PROGRESS NOTE ADULT - SUBJECTIVE AND OBJECTIVE BOX
Pt seen and examined at bedside. Pt states he accidentally removed NGT overnight. Has had multiple BMs overnight. Reports persistent abdominal pain, denies nausea or vomiting.     Allergies    No Known Allergies    MEDICATIONS:  MEDICATIONS  (STANDING):  fat emulsion (Plant Based) 20% Infusion 0.466 Gm/kG/Day (20.873 mL/Hr) IV Continuous <Continuous>  heparin  flush 100 Units/mL Injectable 100 Unit(s) IV Push every other day  heparin  Injectable 5000 Unit(s) SubCutaneous every 8 hours  insulin lispro (HumaLOG) corrective regimen sliding scale.   SubCutaneous every 6 hours  lactated ringers. 1000 milliLiter(s) (75 mL/Hr) IV Continuous <Continuous>  levothyroxine Injectable 75 MICROGram(s) IV Push at bedtime  pantoprazole  Injectable 40 milliGRAM(s) IV Push daily  Parenteral Nutrition - Adult 1 Each (67 mL/Hr) TPN Continuous <Continuous>  Parenteral Nutrition - Adult 1 Each (67 mL/Hr) TPN Continuous <Continuous>    MEDICATIONS  (PRN):  HYDROmorphone  Injectable 0.5 milliGRAM(s) IV Push every 3 hours PRN Severe Pain (7 - 10)    Vital Signs Last 24 Hrs  T(C): 37.4 (30 Jun 2018 08:47), Max: 38 (29 Jun 2018 16:45)  T(F): 99.3 (30 Jun 2018 08:47), Max: 100.4 (29 Jun 2018 16:45)  HR: 72 (30 Jun 2018 08:47) (58 - 76)  BP: 146/84 (30 Jun 2018 08:47) (132/61 - 166/93)  BP(mean): --  RR: 17 (30 Jun 2018 08:47) (17 - 18)  SpO2: 96% (30 Jun 2018 08:47) (95% - 98%)    06-29 @ 07:01  -  06-30 @ 07:00  --------------------------------------------------------  IN: 3747 mL / OUT: 1000 mL / NET: 2747 mL    PHYSICAL EXAM:    General: Well developed; well nourished; in no acute distress  HEENT: MMM, conjunctiva and sclera clear  Gastrointestinal: Soft non-tender moderately distended;  No rebound or guarding  Skin: Warm and dry. No obvious rash    LABS:                        11.5   14.7  )-----------( 405      ( 30 Jun 2018 09:22 )             34.4     06-30    133<L>  |  96  |  12  ----------------------------<  110<H>  4.6   |  26  |  0.79    Ca    8.3<L>      30 Jun 2018 09:22  Phos  3.3     06-30  Mg     2.0     06-30    TPro  6.6  /  Alb  2.6<L>  /  TBili  1.1  /  DBili  x   /  AST  42<H>  /  ALT  31  /  AlkPhos  79  06-30    RADIOLOGY & ADDITIONAL STUDIES:  CT Abdomen and Pelvis w/ Oral Cont and w/ IV Cont (06.29.18 @ 16:53)  Findings:A feeding tube is present. It terminates in the distal   esophagus. Bibasilar atelectasis is present. There is no pericardial   effusion.    As on the preceding CT scan there are multiple hypodense lesions   throughout the liver. The gallbladder is incompletely distended. Several   small radiopaque densities are seen in the gallbladder. There is no intra   or extrahepatic biliary ductal dilatation. No intraductal stone is   identified.    The pancreas appears edematous and there is infiltration in the   surrounding soft tissues. No drainable collection is identified. The   extent of inflammatory change particularly near the tail is slightly   increased from the previous examination. Inflammatory changes extend to   the greater curvature of the stomach and portions of the proximal small   bowel. Spleen is within normal limits.    There is no adrenal mass. No hydronephrosis is present. There are   nonobstructing right renal calculi unchanged from the prior study. An   exophytic right renal cyst is without significant change.    In comparison to the previous examination there is increased distention   of several loops of small bowel in the upper mid abdomen measuring up to   5 cm. There is passage of contrast into the more distal small bowel   suggesting ileus rather than obstruction. However an evolving or partial   obstruction cannot be excluded. There is no evidence of diverticulitis or   appendicitis. No free air is present in the abdomen.    Mild calcification is present in the aorta and iliacs. The IVC appears   unremarkable. There is no free fluid in the pelvis. The bladder is within   normal limits. Bilateral fat-containing inguinal hernias are present.    Reactive appearing lymph nodes are present adjacent the pancreas and   mary hepatis.    No acute osseous injury is identified.    Impression:     Findings consistent with acute pancreatitis and mild increase in extent   of inflammation when compared to the prior study.    Cholelithiasis.    Increased distention of small bowel passage of contrast distally   suggesting an ileus. Evolving obstruction is considered less likely but   cannot be excluded.    Nonobstructive right-sided renal stones.

## 2018-06-30 NOTE — PROGRESS NOTE ADULT - ASSESSMENT
58M hx duodenitis/gastritis, gallstones, admitted with acute pancreatitis, suspect possible gallstone pancreatitis now s/p ERCP w/ sphincterotomy   NPO/IVF/TPN  OOBA/IS  SCDs/SQH  AM labs

## 2018-06-30 NOTE — CHART NOTE - NSCHARTNOTEFT_GEN_A_CORE
Patient examined at bedside. Patient was sleeping comfortably. He denied any nausea or emesis. He had 3 bowel movements since last visit.     Vitals are stable. Patient's abdomen is distended, but unchanged from last physical exam. Mild epigastric tenderness, otherwise nontender. No guarding or rebound.

## 2018-07-01 LAB
ALBUMIN SERPL ELPH-MCNC: 3 G/DL — LOW (ref 3.3–5)
ALP SERPL-CCNC: 72 U/L — SIGNIFICANT CHANGE UP (ref 40–120)
ALT FLD-CCNC: 35 U/L — SIGNIFICANT CHANGE UP (ref 10–45)
ANION GAP SERPL CALC-SCNC: 11 MMOL/L — SIGNIFICANT CHANGE UP (ref 5–17)
AST SERPL-CCNC: 33 U/L — SIGNIFICANT CHANGE UP (ref 10–40)
BILIRUB SERPL-MCNC: 0.9 MG/DL — SIGNIFICANT CHANGE UP (ref 0.2–1.2)
BUN SERPL-MCNC: 13 MG/DL — SIGNIFICANT CHANGE UP (ref 7–23)
CALCIUM SERPL-MCNC: 9.2 MG/DL — SIGNIFICANT CHANGE UP (ref 8.4–10.5)
CHLORIDE SERPL-SCNC: 96 MMOL/L — SIGNIFICANT CHANGE UP (ref 96–108)
CO2 SERPL-SCNC: 27 MMOL/L — SIGNIFICANT CHANGE UP (ref 22–31)
CREAT SERPL-MCNC: 0.76 MG/DL — SIGNIFICANT CHANGE UP (ref 0.5–1.3)
GLUCOSE BLDC GLUCOMTR-MCNC: 100 MG/DL — HIGH (ref 70–99)
GLUCOSE BLDC GLUCOMTR-MCNC: 107 MG/DL — HIGH (ref 70–99)
GLUCOSE BLDC GLUCOMTR-MCNC: 113 MG/DL — HIGH (ref 70–99)
GLUCOSE SERPL-MCNC: 120 MG/DL — HIGH (ref 70–99)
HCT VFR BLD CALC: 37.3 % — LOW (ref 39–50)
HGB BLD-MCNC: 12.6 G/DL — LOW (ref 13–17)
MAGNESIUM SERPL-MCNC: 2.2 MG/DL — SIGNIFICANT CHANGE UP (ref 1.6–2.6)
MCHC RBC-ENTMCNC: 30.1 PG — SIGNIFICANT CHANGE UP (ref 27–34)
MCHC RBC-ENTMCNC: 33.8 G/DL — SIGNIFICANT CHANGE UP (ref 32–36)
MCV RBC AUTO: 89 FL — SIGNIFICANT CHANGE UP (ref 80–100)
PHOSPHATE SERPL-MCNC: 3.5 MG/DL — SIGNIFICANT CHANGE UP (ref 2.5–4.5)
PLATELET # BLD AUTO: 482 K/UL — HIGH (ref 150–400)
POTASSIUM SERPL-MCNC: 4.9 MMOL/L — SIGNIFICANT CHANGE UP (ref 3.5–5.3)
POTASSIUM SERPL-SCNC: 4.9 MMOL/L — SIGNIFICANT CHANGE UP (ref 3.5–5.3)
PROT SERPL-MCNC: 7 G/DL — SIGNIFICANT CHANGE UP (ref 6–8.3)
RBC # BLD: 4.19 M/UL — LOW (ref 4.2–5.8)
RBC # FLD: 14.9 % — SIGNIFICANT CHANGE UP (ref 10.3–16.9)
SODIUM SERPL-SCNC: 134 MMOL/L — LOW (ref 135–145)
WBC # BLD: 13.9 K/UL — HIGH (ref 3.8–10.5)
WBC # FLD AUTO: 13.9 K/UL — HIGH (ref 3.8–10.5)

## 2018-07-01 RX ORDER — ELECTROLYTE SOLUTION,INJ
1 VIAL (ML) INTRAVENOUS
Qty: 0 | Refills: 0 | Status: DISCONTINUED | OUTPATIENT
Start: 2018-07-01 | End: 2018-07-01

## 2018-07-01 RX ADMIN — HYDROMORPHONE HYDROCHLORIDE 0.5 MILLIGRAM(S): 2 INJECTION INTRAMUSCULAR; INTRAVENOUS; SUBCUTANEOUS at 13:15

## 2018-07-01 RX ADMIN — HEPARIN SODIUM 5000 UNIT(S): 5000 INJECTION INTRAVENOUS; SUBCUTANEOUS at 06:43

## 2018-07-01 RX ADMIN — PANTOPRAZOLE SODIUM 40 MILLIGRAM(S): 20 TABLET, DELAYED RELEASE ORAL at 13:28

## 2018-07-01 RX ADMIN — HYDROMORPHONE HYDROCHLORIDE 0.5 MILLIGRAM(S): 2 INJECTION INTRAMUSCULAR; INTRAVENOUS; SUBCUTANEOUS at 18:08

## 2018-07-01 RX ADMIN — HEPARIN SODIUM 5000 UNIT(S): 5000 INJECTION INTRAVENOUS; SUBCUTANEOUS at 13:28

## 2018-07-01 RX ADMIN — HYDROMORPHONE HYDROCHLORIDE 0.5 MILLIGRAM(S): 2 INJECTION INTRAMUSCULAR; INTRAVENOUS; SUBCUTANEOUS at 07:22

## 2018-07-01 RX ADMIN — Medication 100 UNIT(S): at 13:28

## 2018-07-01 RX ADMIN — Medication 75 MICROGRAM(S): at 21:39

## 2018-07-01 RX ADMIN — HYDROMORPHONE HYDROCHLORIDE 0.5 MILLIGRAM(S): 2 INJECTION INTRAMUSCULAR; INTRAVENOUS; SUBCUTANEOUS at 14:02

## 2018-07-01 RX ADMIN — HEPARIN SODIUM 5000 UNIT(S): 5000 INJECTION INTRAVENOUS; SUBCUTANEOUS at 21:39

## 2018-07-01 RX ADMIN — HYDROMORPHONE HYDROCHLORIDE 0.5 MILLIGRAM(S): 2 INJECTION INTRAMUSCULAR; INTRAVENOUS; SUBCUTANEOUS at 06:43

## 2018-07-01 RX ADMIN — HYDROMORPHONE HYDROCHLORIDE 0.5 MILLIGRAM(S): 2 INJECTION INTRAMUSCULAR; INTRAVENOUS; SUBCUTANEOUS at 17:12

## 2018-07-01 RX ADMIN — HYDROMORPHONE HYDROCHLORIDE 0.5 MILLIGRAM(S): 2 INJECTION INTRAMUSCULAR; INTRAVENOUS; SUBCUTANEOUS at 09:49

## 2018-07-01 RX ADMIN — HYDROMORPHONE HYDROCHLORIDE 0.5 MILLIGRAM(S): 2 INJECTION INTRAMUSCULAR; INTRAVENOUS; SUBCUTANEOUS at 10:37

## 2018-07-01 RX ADMIN — HYDROMORPHONE HYDROCHLORIDE 0.5 MILLIGRAM(S): 2 INJECTION INTRAMUSCULAR; INTRAVENOUS; SUBCUTANEOUS at 20:49

## 2018-07-01 RX ADMIN — HYDROMORPHONE HYDROCHLORIDE 0.5 MILLIGRAM(S): 2 INJECTION INTRAMUSCULAR; INTRAVENOUS; SUBCUTANEOUS at 20:39

## 2018-07-01 NOTE — PROGRESS NOTE ADULT - ASSESSMENT
58M hx duodenitis/gastritis, gallstones, admitted with acute pancreatitis, suspect possible gallstone pancreatitis now s/p ERCP w/ sphincterotomy     NPO with Ice chips /IVF/TPN  OOBA/IS  SCDs/SQH  AM labs

## 2018-07-01 NOTE — PROGRESS NOTE ADULT - SUBJECTIVE AND OBJECTIVE BOX
SUBJECTIVE: Pt seen and examined at bedside. He denies N/V. He reports passing flatus but denies BM. Patient removed NG tube overnight.     heparin  flush 100 Units/mL Injectable 100 Unit(s) IV Push every other day  heparin  Injectable 5000 Unit(s) SubCutaneous every 8 hours      Vital Signs Last 24 Hrs  T(C): 36.8 (01 Jul 2018 14:33), Max: 37.5 (01 Jul 2018 05:00)  T(F): 98.3 (01 Jul 2018 14:33), Max: 99.5 (01 Jul 2018 05:00)  HR: 67 (01 Jul 2018 14:33) (63 - 70)  BP: 136/84 (01 Jul 2018 14:33) (116/77 - 159/93)  BP(mean): --  RR: 20 (01 Jul 2018 14:33) (17 - 20)  SpO2: 97% (01 Jul 2018 14:33) (94% - 98%)    General: NAD, resting comfortably in bed  Pulm: Nonlabored breathing, no respiratory distress  Abd: soft, NT/ND.        LABS:                        12.6   13.9  )-----------( 482      ( 01 Jul 2018 06:48 )             37.3     07-01    134<L>  |  96  |  13  ----------------------------<  120<H>  4.9   |  27  |  0.76    Ca    9.2      01 Jul 2018 06:48  Phos  3.5     07-01  Mg     2.2     07-01    TPro  7.0  /  Alb  3.0<L>  /  TBili  0.9  /  DBili  x   /  AST  33  /  ALT  35  /  AlkPhos  72  07-01

## 2018-07-02 LAB
ALBUMIN SERPL ELPH-MCNC: 3.3 G/DL — SIGNIFICANT CHANGE UP (ref 3.3–5)
ALBUMIN SERPL ELPH-MCNC: 3.4 G/DL — SIGNIFICANT CHANGE UP (ref 3.3–5)
ALBUMIN SERPL ELPH-MCNC: 3.4 G/DL — SIGNIFICANT CHANGE UP (ref 3.3–5)
ALP SERPL-CCNC: 76 U/L — SIGNIFICANT CHANGE UP (ref 40–120)
ALP SERPL-CCNC: 93 U/L — SIGNIFICANT CHANGE UP (ref 40–120)
ALP SERPL-CCNC: 93 U/L — SIGNIFICANT CHANGE UP (ref 40–120)
ALT FLD-CCNC: 49 U/L — HIGH (ref 10–45)
ALT FLD-CCNC: 58 U/L — HIGH (ref 10–45)
ALT FLD-CCNC: 58 U/L — HIGH (ref 10–45)
ANION GAP SERPL CALC-SCNC: 15 MMOL/L — SIGNIFICANT CHANGE UP (ref 5–17)
ANION GAP SERPL CALC-SCNC: 16 MMOL/L — SIGNIFICANT CHANGE UP (ref 5–17)
APPEARANCE UR: CLEAR — SIGNIFICANT CHANGE UP
AST SERPL-CCNC: 43 U/L — HIGH (ref 10–40)
AST SERPL-CCNC: 49 U/L — HIGH (ref 10–40)
AST SERPL-CCNC: 49 U/L — HIGH (ref 10–40)
BILIRUB DIRECT SERPL-MCNC: 0.5 MG/DL — HIGH (ref 0–0.2)
BILIRUB INDIRECT FLD-MCNC: 0.9 MG/DL — SIGNIFICANT CHANGE UP (ref 0.2–1)
BILIRUB SERPL-MCNC: 1.2 MG/DL — SIGNIFICANT CHANGE UP (ref 0.2–1.2)
BILIRUB SERPL-MCNC: 1.4 MG/DL — HIGH (ref 0.2–1.2)
BILIRUB SERPL-MCNC: 1.4 MG/DL — HIGH (ref 0.2–1.2)
BILIRUB UR-MCNC: NEGATIVE — SIGNIFICANT CHANGE UP
BUN SERPL-MCNC: 14 MG/DL — SIGNIFICANT CHANGE UP (ref 7–23)
BUN SERPL-MCNC: 14 MG/DL — SIGNIFICANT CHANGE UP (ref 7–23)
CALCIUM SERPL-MCNC: 9 MG/DL — SIGNIFICANT CHANGE UP (ref 8.4–10.5)
CALCIUM SERPL-MCNC: 9.1 MG/DL — SIGNIFICANT CHANGE UP (ref 8.4–10.5)
CHLORIDE SERPL-SCNC: 88 MMOL/L — LOW (ref 96–108)
CHLORIDE SERPL-SCNC: 91 MMOL/L — LOW (ref 96–108)
CO2 SERPL-SCNC: 24 MMOL/L — SIGNIFICANT CHANGE UP (ref 22–31)
CO2 SERPL-SCNC: 27 MMOL/L — SIGNIFICANT CHANGE UP (ref 22–31)
COLOR SPEC: YELLOW — SIGNIFICANT CHANGE UP
CREAT SERPL-MCNC: 0.88 MG/DL — SIGNIFICANT CHANGE UP (ref 0.5–1.3)
CREAT SERPL-MCNC: 0.95 MG/DL — SIGNIFICANT CHANGE UP (ref 0.5–1.3)
DIFF PNL FLD: NEGATIVE — SIGNIFICANT CHANGE UP
GLUCOSE BLDC GLUCOMTR-MCNC: 102 MG/DL — HIGH (ref 70–99)
GLUCOSE BLDC GLUCOMTR-MCNC: 103 MG/DL — HIGH (ref 70–99)
GLUCOSE BLDC GLUCOMTR-MCNC: 128 MG/DL — HIGH (ref 70–99)
GLUCOSE BLDC GLUCOMTR-MCNC: 91 MG/DL — SIGNIFICANT CHANGE UP (ref 70–99)
GLUCOSE BLDC GLUCOMTR-MCNC: 98 MG/DL — SIGNIFICANT CHANGE UP (ref 70–99)
GLUCOSE SERPL-MCNC: 82 MG/DL — SIGNIFICANT CHANGE UP (ref 70–99)
GLUCOSE SERPL-MCNC: 96 MG/DL — SIGNIFICANT CHANGE UP (ref 70–99)
GLUCOSE UR QL: NEGATIVE — SIGNIFICANT CHANGE UP
HCT VFR BLD CALC: 36.7 % — LOW (ref 39–50)
HGB BLD-MCNC: 12 G/DL — LOW (ref 13–17)
KETONES UR-MCNC: NEGATIVE — SIGNIFICANT CHANGE UP
LEUKOCYTE ESTERASE UR-ACNC: NEGATIVE — SIGNIFICANT CHANGE UP
MAGNESIUM SERPL-MCNC: 2.2 MG/DL — SIGNIFICANT CHANGE UP (ref 1.6–2.6)
MCHC RBC-ENTMCNC: 29.6 PG — SIGNIFICANT CHANGE UP (ref 27–34)
MCHC RBC-ENTMCNC: 32.7 G/DL — SIGNIFICANT CHANGE UP (ref 32–36)
MCV RBC AUTO: 90.4 FL — SIGNIFICANT CHANGE UP (ref 80–100)
NITRITE UR-MCNC: NEGATIVE — SIGNIFICANT CHANGE UP
PH UR: 8.5 — HIGH (ref 5–8)
PHOSPHATE SERPL-MCNC: 3.3 MG/DL — SIGNIFICANT CHANGE UP (ref 2.5–4.5)
PLATELET # BLD AUTO: 523 K/UL — HIGH (ref 150–400)
POTASSIUM SERPL-MCNC: 5 MMOL/L — SIGNIFICANT CHANGE UP (ref 3.5–5.3)
POTASSIUM SERPL-MCNC: 5.1 MMOL/L — SIGNIFICANT CHANGE UP (ref 3.5–5.3)
POTASSIUM SERPL-SCNC: 5 MMOL/L — SIGNIFICANT CHANGE UP (ref 3.5–5.3)
POTASSIUM SERPL-SCNC: 5.1 MMOL/L — SIGNIFICANT CHANGE UP (ref 3.5–5.3)
PROT SERPL-MCNC: 7.3 G/DL — SIGNIFICANT CHANGE UP (ref 6–8.3)
PROT SERPL-MCNC: 7.9 G/DL — SIGNIFICANT CHANGE UP (ref 6–8.3)
PROT SERPL-MCNC: 7.9 G/DL — SIGNIFICANT CHANGE UP (ref 6–8.3)
PROT UR-MCNC: NEGATIVE MG/DL — SIGNIFICANT CHANGE UP
RBC # BLD: 4.06 M/UL — LOW (ref 4.2–5.8)
RBC # FLD: 14.8 % — SIGNIFICANT CHANGE UP (ref 10.3–16.9)
SODIUM SERPL-SCNC: 128 MMOL/L — LOW (ref 135–145)
SODIUM SERPL-SCNC: 133 MMOL/L — LOW (ref 135–145)
SP GR SPEC: 1.01 — SIGNIFICANT CHANGE UP (ref 1–1.03)
UROBILINOGEN FLD QL: 2 E.U./DL
WBC # BLD: 11.8 K/UL — HIGH (ref 3.8–10.5)
WBC # FLD AUTO: 11.8 K/UL — HIGH (ref 3.8–10.5)

## 2018-07-02 PROCEDURE — 71045 X-RAY EXAM CHEST 1 VIEW: CPT | Mod: 26

## 2018-07-02 RX ORDER — ACETAMINOPHEN 500 MG
1000 TABLET ORAL ONCE
Qty: 0 | Refills: 0 | Status: COMPLETED | OUTPATIENT
Start: 2018-07-02 | End: 2018-07-02

## 2018-07-02 RX ORDER — I.V. FAT EMULSION 20 G/100ML
0.47 EMULSION INTRAVENOUS
Qty: 50 | Refills: 0 | Status: DISCONTINUED | OUTPATIENT
Start: 2018-07-02 | End: 2018-07-02

## 2018-07-02 RX ORDER — ACETAMINOPHEN 500 MG
975 TABLET ORAL ONCE
Qty: 0 | Refills: 0 | Status: COMPLETED | OUTPATIENT
Start: 2018-07-02 | End: 2018-07-02

## 2018-07-02 RX ORDER — ELECTROLYTE SOLUTION,INJ
1 VIAL (ML) INTRAVENOUS
Qty: 0 | Refills: 0 | Status: DISCONTINUED | OUTPATIENT
Start: 2018-07-02 | End: 2018-07-02

## 2018-07-02 RX ADMIN — HYDROMORPHONE HYDROCHLORIDE 0.5 MILLIGRAM(S): 2 INJECTION INTRAMUSCULAR; INTRAVENOUS; SUBCUTANEOUS at 04:33

## 2018-07-02 RX ADMIN — I.V. FAT EMULSION 31.24 GM/KG/DAY: 20 EMULSION INTRAVENOUS at 21:50

## 2018-07-02 RX ADMIN — HYDROMORPHONE HYDROCHLORIDE 0.5 MILLIGRAM(S): 2 INJECTION INTRAMUSCULAR; INTRAVENOUS; SUBCUTANEOUS at 21:59

## 2018-07-02 RX ADMIN — Medication 400 MILLIGRAM(S): at 18:48

## 2018-07-02 RX ADMIN — HYDROMORPHONE HYDROCHLORIDE 0.5 MILLIGRAM(S): 2 INJECTION INTRAMUSCULAR; INTRAVENOUS; SUBCUTANEOUS at 07:53

## 2018-07-02 RX ADMIN — HYDROMORPHONE HYDROCHLORIDE 0.5 MILLIGRAM(S): 2 INJECTION INTRAMUSCULAR; INTRAVENOUS; SUBCUTANEOUS at 00:27

## 2018-07-02 RX ADMIN — HYDROMORPHONE HYDROCHLORIDE 0.5 MILLIGRAM(S): 2 INJECTION INTRAMUSCULAR; INTRAVENOUS; SUBCUTANEOUS at 11:29

## 2018-07-02 RX ADMIN — HYDROMORPHONE HYDROCHLORIDE 0.5 MILLIGRAM(S): 2 INJECTION INTRAMUSCULAR; INTRAVENOUS; SUBCUTANEOUS at 18:51

## 2018-07-02 RX ADMIN — HYDROMORPHONE HYDROCHLORIDE 0.5 MILLIGRAM(S): 2 INJECTION INTRAMUSCULAR; INTRAVENOUS; SUBCUTANEOUS at 14:21

## 2018-07-02 RX ADMIN — Medication 75 MICROGRAM(S): at 21:50

## 2018-07-02 RX ADMIN — PANTOPRAZOLE SODIUM 40 MILLIGRAM(S): 20 TABLET, DELAYED RELEASE ORAL at 14:14

## 2018-07-02 RX ADMIN — HYDROMORPHONE HYDROCHLORIDE 0.5 MILLIGRAM(S): 2 INJECTION INTRAMUSCULAR; INTRAVENOUS; SUBCUTANEOUS at 14:04

## 2018-07-02 RX ADMIN — HEPARIN SODIUM 5000 UNIT(S): 5000 INJECTION INTRAVENOUS; SUBCUTANEOUS at 21:50

## 2018-07-02 RX ADMIN — Medication 975 MILLIGRAM(S): at 09:02

## 2018-07-02 RX ADMIN — HYDROMORPHONE HYDROCHLORIDE 0.5 MILLIGRAM(S): 2 INJECTION INTRAMUSCULAR; INTRAVENOUS; SUBCUTANEOUS at 07:43

## 2018-07-02 RX ADMIN — HYDROMORPHONE HYDROCHLORIDE 0.5 MILLIGRAM(S): 2 INJECTION INTRAMUSCULAR; INTRAVENOUS; SUBCUTANEOUS at 19:18

## 2018-07-02 RX ADMIN — HYDROMORPHONE HYDROCHLORIDE 0.5 MILLIGRAM(S): 2 INJECTION INTRAMUSCULAR; INTRAVENOUS; SUBCUTANEOUS at 04:43

## 2018-07-02 RX ADMIN — HYDROMORPHONE HYDROCHLORIDE 0.5 MILLIGRAM(S): 2 INJECTION INTRAMUSCULAR; INTRAVENOUS; SUBCUTANEOUS at 00:35

## 2018-07-02 RX ADMIN — HYDROMORPHONE HYDROCHLORIDE 0.5 MILLIGRAM(S): 2 INJECTION INTRAMUSCULAR; INTRAVENOUS; SUBCUTANEOUS at 10:50

## 2018-07-02 RX ADMIN — HEPARIN SODIUM 5000 UNIT(S): 5000 INJECTION INTRAVENOUS; SUBCUTANEOUS at 14:14

## 2018-07-02 RX ADMIN — HEPARIN SODIUM 5000 UNIT(S): 5000 INJECTION INTRAVENOUS; SUBCUTANEOUS at 05:14

## 2018-07-02 RX ADMIN — HYDROMORPHONE HYDROCHLORIDE 0.5 MILLIGRAM(S): 2 INJECTION INTRAMUSCULAR; INTRAVENOUS; SUBCUTANEOUS at 21:50

## 2018-07-02 NOTE — CHART NOTE - NSCHARTNOTEFT_GEN_A_CORE
Admitting Diagnosis:   Patient is a 58y old  Male who presents with a chief complaint of Acute pancreatitis (18 Jun 2018 22:02)      PAST MEDICAL & SURGICAL HISTORY:  PUD (peptic ulcer disease)  No significant past surgical history      Current Nutrition Order: NPO  TPN: 200g Dex, 80g AA, 50g lipids (1500kcal, 0.9g pro/kg IBW, GIR 1.29)  -Dex & AA never adjusted since last recs made to team.  PO Intake: Good (%) [   ]  Fair (50-75%) [   ] Poor (<25%) [   ] N/A    GI Issues:   Denies N/V.   Pt removed NGT o/n  Distension however pt reports it is going down.  No pain/discomfort after ice chips.    Pain:  c/o abdominal pain -finds relief w/ pain meds.    Skin Integrity: WDL per flowsheet.     Labs:   07-02    133<L>  |  91<L>  |  14  ----------------------------<  82  5.1   |  27  |  0.88    Ca    9.1      02 Jul 2018 10:01  Phos  3.3     07-02  Mg     2.2     07-02    TPro  7.3  /  Alb  3.3  /  TBili  1.2  /  DBili  x   /  AST  43<H>  /  ALT  49<H>  /  AlkPhos  76  07-02    CAPILLARY BLOOD GLUCOSE      POCT Blood Glucose.: 102 mg/dL (02 Jul 2018 06:50)  POCT Blood Glucose.: 103 mg/dL (02 Jul 2018 00:13)  POCT Blood Glucose.: 107 mg/dL (01 Jul 2018 18:42)  POCT Blood Glucose.: 100 mg/dL (01 Jul 2018 12:08)      Medications:  MEDICATIONS  (STANDING):  fat emulsion (Plant Based) 20% Infusion 0.465 Gm/kG/Day (31.242 mL/Hr) IV Continuous <Continuous>  heparin  flush 100 Units/mL Injectable 100 Unit(s) IV Push every other day  heparin  Injectable 5000 Unit(s) SubCutaneous every 8 hours  insulin lispro (HumaLOG) corrective regimen sliding scale.   SubCutaneous every 6 hours  lactated ringers. 1000 milliLiter(s) (75 mL/Hr) IV Continuous <Continuous>  levothyroxine Injectable 75 MICROGram(s) IV Push at bedtime  pantoprazole  Injectable 40 milliGRAM(s) IV Push daily  Parenteral Nutrition - Adult 1 Each (67 mL/Hr) TPN Continuous <Continuous>  Parenteral Nutrition - Adult 1 Each (67 mL/Hr) TPN Continuous <Continuous>    MEDICATIONS  (PRN):  HYDROmorphone  Injectable 0.5 milliGRAM(s) IV Push every 3 hours PRN Severe Pain (7 - 10)      Weight:  6/23: 224lbs  6/18: 237lbs  Ht:6ft 2inches,IBW:190lbs+/-10%,BMI:30.2,124% of IBW.    Weight Change:   Pt appears to have had a 13lb wt loss since admission. Please taken new wt for accuracy.     Estimated energy needs:   IBW used for calculations as pt >120% of IBW   Nutrient needs based on [facility] standards of care for maintenance in adults.   2152-2583kcal/day (25-30kcal/kg)  86-103g pro/day (1-1.2g/kg)  2152-2583ml/day (25-30ml/kg)    Subjective:   57yo M w/ h/o duodenitis/gastritis, gallstones, admitted w/ acute pancreatitis, suspect possible gallstone pancreatitis now s/p ERCP w/ stone removal and sphincterotomy. Pt now now with new fevers s/p CT showing persistent pancreatitis and possible necrosis. Spiked fever again this am. Pt seen OOBA in halls. Continues to be NPO. NGT was pulled out o/n. Denies N/V. States distension is going down. Current TPN order is insufficient for meeting estimated needs -discussed recs to increase AA & Dex at previous f/u. No BM, passing flatus. Continues to be understanding of receiving kcal/pro via PN. If pt can tolerate PO recommend trialing clears and assessing tolerance. PN ordered in setting of ileus.  Please consider post-pyloric enteral feeds w/ resolution of ileus to avoid pancreatic stimulation.    Previous Nutrition Diagnosis:   inadequate kcal/pro intake RT parenteral infusion insufficient to meet EER AEB pt meeting <75% estimated needs    Active [ x  ]  Resolved [   ]  -Dex & AA never adjusted since last recs made to team.    If resolved, new PES:     Goal: Pt to meet >75% estimated needs via most appropriate route. Nutrition support initiated w/in next 24-48 hrs.     Recommendations:  1) Recommend increasing macros: 430g Dex,112g AA,50g 20% lipids (2410kcal, 1.3g pro/kg, GIR 2.77)   Day 1: 150g Dex,112g AA,50g 20% lipids   Day 2: 250g Dex,112g AA,50g 20% lipids   Day 3: 350g Dex,112g AA,50g 20% lipids   Day 4: 430g Dex,112g AA,50g 20% lipids   >> Micronutrients, lytes, fluids per MD discretion.   2)  Please consider post-pyloric enteral feeds w/ resolution of ileus to avoid pancreatic stimulation.  3) Monitor BG Q6H and replete lytes PRN.   4) monitor bowel function   5) trend weights  Recs discussed w/ team- discussed advancement of dextrose to 430 over next 2 days.    Education: Pt understanding of current NPO status.    Risk Level: High [  x ] Moderate [   ] Low [   ]. Admitting Diagnosis:   Patient is a 58y old  Male who presents with a chief complaint of Acute pancreatitis (18 Jun 2018 22:02)      PAST MEDICAL & SURGICAL HISTORY:  PUD (peptic ulcer disease)  No significant past surgical history      Current Nutrition Order: NPO  TPN: 200g Dex, 80g AA, 50g lipids (1500kcal, 0.9g pro/kg IBW, GIR 1.29)  -Dex & AA never adjusted since last recs made to team.  PO Intake: Good (%) [   ]  Fair (50-75%) [   ] Poor (<25%) [   ] N/A    GI Issues:   Denies N/V.   Pt removed NGT o/n  Distension however pt reports it is going down.  No pain/discomfort after ice chips.    Pain:  c/o abdominal pain -finds relief w/ pain meds.    Skin Integrity: WDL per flowsheet.     Labs:   07-02    133<L>  |  91<L>  |  14  ----------------------------<  82  5.1   |  27  |  0.88    Ca    9.1      02 Jul 2018 10:01  Phos  3.3     07-02  Mg     2.2     07-02    TPro  7.3  /  Alb  3.3  /  TBili  1.2  /  DBili  x   /  AST  43<H>  /  ALT  49<H>  /  AlkPhos  76  07-02    CAPILLARY BLOOD GLUCOSE      POCT Blood Glucose.: 102 mg/dL (02 Jul 2018 06:50)  POCT Blood Glucose.: 103 mg/dL (02 Jul 2018 00:13)  POCT Blood Glucose.: 107 mg/dL (01 Jul 2018 18:42)  POCT Blood Glucose.: 100 mg/dL (01 Jul 2018 12:08)      Medications:  MEDICATIONS  (STANDING):  fat emulsion (Plant Based) 20% Infusion 0.465 Gm/kG/Day (31.242 mL/Hr) IV Continuous <Continuous>  heparin  flush 100 Units/mL Injectable 100 Unit(s) IV Push every other day  heparin  Injectable 5000 Unit(s) SubCutaneous every 8 hours  insulin lispro (HumaLOG) corrective regimen sliding scale.   SubCutaneous every 6 hours  lactated ringers. 1000 milliLiter(s) (75 mL/Hr) IV Continuous <Continuous>  levothyroxine Injectable 75 MICROGram(s) IV Push at bedtime  pantoprazole  Injectable 40 milliGRAM(s) IV Push daily  Parenteral Nutrition - Adult 1 Each (67 mL/Hr) TPN Continuous <Continuous>  Parenteral Nutrition - Adult 1 Each (67 mL/Hr) TPN Continuous <Continuous>    MEDICATIONS  (PRN):  HYDROmorphone  Injectable 0.5 milliGRAM(s) IV Push every 3 hours PRN Severe Pain (7 - 10)      Weight:  6/23: 224lbs  6/18: 237lbs  Ht:6ft 2inches,IBW:190lbs+/-10%,BMI:30.2,124% of IBW.    Weight Change:   Pt appears to have had a 13lb wt loss since admission. Please taken new wt for accuracy.     Estimated energy needs:   IBW used for calculations as pt >120% of IBW   Nutrient needs based on [facility] standards of care for maintenance in adults.   2152-2583kcal/day (25-30kcal/kg)  86-103g pro/day (1-1.2g/kg)  2152-2583ml/day (25-30ml/kg)    Subjective:   57yo M w/ h/o duodenitis/gastritis, gallstones, admitted w/ acute pancreatitis, suspect possible gallstone pancreatitis now s/p ERCP w/ stone removal and sphincterotomy. Pt now now with new fevers s/p CT showing persistent pancreatitis and possible necrosis. Spiked fever again this am. Pt seen OOBA in halls. Continues to be NPO. NGT was pulled out o/n. Denies N/V. States distension is going down. Current TPN order is insufficient for meeting estimated needs -discussed recs to increase AA & Dex at previous f/u. No BM, passing flatus. Continues to be understanding of receiving kcal/pro via PN. If pt can tolerate PO recommend trialing clears and assessing tolerance. PN ordered in setting of ileus.  Please consider post-pyloric enteral feeds w/ resolution of ileus to avoid pancreatic stimulation.    Previous Nutrition Diagnosis:   inadequate kcal/pro intake RT parenteral infusion insufficient to meet EER AEB pt meeting <75% estimated needs    Active [ x  ]  Resolved [   ]  -Dex & AA never adjusted since last recs made to team.    If resolved, new PES:     Goal: Pt to meet >75% estimated needs via most appropriate route. Nutrition support initiated w/in next 24-48 hrs.     Recommendations:  1) Recommend increasing macros: 430g Dex,112g AA,50g 20% lipids (2410kcal, 1.3g pro/kg, GIR 2.77)   Day 1: 150g Dex,112g AA,50g 20% lipids   Day 2: 250g Dex,112g AA,50g 20% lipids   Day 3: 350g Dex,112g AA,50g 20% lipids   Day 4: 430g Dex,112g AA,50g 20% lipids   >> Micronutrients, lytes, fluids per MD discretion.   2) Please consider post-pyloric enteral feeds w/ resolution of ileus to avoid pancreatic stimulation. Or trickle feeds to utilize gut.   3) Monitor BG Q6H and replete lytes PRN.   4) monitor bowel function   5) trend weights  Team paged to discuss recs    Education: Pt understanding of current NPO status.    Risk Level: High [  x ] Moderate [   ] Low [   ]. Admitting Diagnosis:   Patient is a 58y old  Male who presents with a chief complaint of Acute pancreatitis (18 Jun 2018 22:02)      PAST MEDICAL & SURGICAL HISTORY:  PUD (peptic ulcer disease)  No significant past surgical history      Current Nutrition Order: NPO  TPN: 200g Dex, 80g AA, 50g lipids (1500kcal, 0.9g pro/kg IBW, GIR 1.29)  -Dex & AA never adjusted since last recs made to team.  PO Intake: Good (%) [   ]  Fair (50-75%) [   ] Poor (<25%) [   ] N/A    GI Issues:   Denies N/V.   Pt removed NGT o/n  Distension however pt reports it is going down.  No pain/discomfort after ice chips.    Pain:  c/o abdominal pain -finds relief w/ pain meds.    Skin Integrity: WDL per flowsheet.     Labs:   07-02    133<L>  |  91<L>  |  14  ----------------------------<  82  5.1   |  27  |  0.88    Ca    9.1      02 Jul 2018 10:01  Phos  3.3     07-02  Mg     2.2     07-02    TPro  7.3  /  Alb  3.3  /  TBili  1.2  /  DBili  x   /  AST  43<H>  /  ALT  49<H>  /  AlkPhos  76  07-02    CAPILLARY BLOOD GLUCOSE      POCT Blood Glucose.: 102 mg/dL (02 Jul 2018 06:50)  POCT Blood Glucose.: 103 mg/dL (02 Jul 2018 00:13)  POCT Blood Glucose.: 107 mg/dL (01 Jul 2018 18:42)  POCT Blood Glucose.: 100 mg/dL (01 Jul 2018 12:08)      Medications:  MEDICATIONS  (STANDING):  fat emulsion (Plant Based) 20% Infusion 0.465 Gm/kG/Day (31.242 mL/Hr) IV Continuous <Continuous>  heparin  flush 100 Units/mL Injectable 100 Unit(s) IV Push every other day  heparin  Injectable 5000 Unit(s) SubCutaneous every 8 hours  insulin lispro (HumaLOG) corrective regimen sliding scale.   SubCutaneous every 6 hours  lactated ringers. 1000 milliLiter(s) (75 mL/Hr) IV Continuous <Continuous>  levothyroxine Injectable 75 MICROGram(s) IV Push at bedtime  pantoprazole  Injectable 40 milliGRAM(s) IV Push daily  Parenteral Nutrition - Adult 1 Each (67 mL/Hr) TPN Continuous <Continuous>  Parenteral Nutrition - Adult 1 Each (67 mL/Hr) TPN Continuous <Continuous>    MEDICATIONS  (PRN):  HYDROmorphone  Injectable 0.5 milliGRAM(s) IV Push every 3 hours PRN Severe Pain (7 - 10)      Weight:  6/23: 224lbs  6/18: 237lbs  Ht:6ft 2inches,IBW:190lbs+/-10%,BMI:30.2,124% of IBW.    Weight Change:   Pt appears to have had a 13lb wt loss since admission. Please taken new wt for accuracy.     Estimated energy needs:   IBW used for calculations as pt >120% of IBW   Nutrient needs based on [facility] standards of care for maintenance in adults.   2152-2583kcal/day (25-30kcal/kg)  86-103g pro/day (1-1.2g/kg)  2152-2583ml/day (25-30ml/kg)    Subjective:   59yo M w/ h/o duodenitis/gastritis, gallstones, admitted w/ acute pancreatitis, suspect possible gallstone pancreatitis now s/p ERCP w/ stone removal and sphincterotomy. Pt now now with new fevers s/p CT showing persistent pancreatitis and possible necrosis. Spiked fever again this am. Pt seen OOBA in halls. Continues to be NPO. NGT was pulled out o/n. Denies N/V. States distension is going down. Current TPN order is insufficient for meeting estimated needs -discussed recs to increase AA & Dex at previous f/u. No BM, passing flatus. Continues to be understanding of receiving kcal/pro via PN. If pt can tolerate PO recommend trialing clears and assessing tolerance. PN ordered in setting of ileus.  Please consider post-pyloric enteral feeds w/ resolution of ileus to avoid pancreatic stimulation.    Previous Nutrition Diagnosis:   inadequate kcal/pro intake RT parenteral infusion insufficient to meet EER AEB pt meeting <75% estimated needs    Active [ x  ]  Resolved [   ]  -Dex & AA never adjusted since last recs made to team.    If resolved, new PES:     Goal: Pt to meet >75% estimated needs via most appropriate route. Nutrition support initiated w/in next 24-48 hrs.     Recommendations:  1) Recommend increasing macros: 430g Dex,112g AA,50g 20% lipids (2410kcal, 1.3g pro/kg, GIR 2.77)   Day 1: 150g Dex,112g AA,50g 20% lipids   Day 2: 250g Dex,112g AA,50g 20% lipids   Day 3: 350g Dex,112g AA,50g 20% lipids   Day 4: 430g Dex,112g AA,50g 20% lipids   >> Micronutrients, lytes, fluids per MD discretion.   2) Please consider post-pyloric enteral feeds w/ resolution of ileus to avoid pancreatic stimulation. Or trickle feeds to utilize gut.   3) Monitor BG Q6H and replete lytes PRN.   4) monitor bowel function   5) trend weights  Discussed w/ team.    Education: Pt understanding of current NPO status.    Risk Level: High [  x ] Moderate [   ] Low [   ].

## 2018-07-02 NOTE — PROGRESS NOTE ADULT - ASSESSMENT
58M hx duodenitis/gastritis, gallstones, admitted with acute pancreatitis, suspect possible gallstone pancreatitis now s/p ERCP w/ sphincterotomy     NPO w/ sips  IVF/TPN  OOBA/IS  SCDs/SQH  AM labs

## 2018-07-02 NOTE — PROGRESS NOTE ADULT - SUBJECTIVE AND OBJECTIVE BOX
SUBJECTIVE: Denies any complaints, tolerating sips of water without N/V. Pain well controlled     MEDICATIONS  (STANDING):  fat emulsion (Plant Based) 20% Infusion 0.465 Gm/kG/Day (31.242 mL/Hr) IV Continuous <Continuous>  heparin  flush 100 Units/mL Injectable 100 Unit(s) IV Push every other day  heparin  Injectable 5000 Unit(s) SubCutaneous every 8 hours  insulin lispro (HumaLOG) corrective regimen sliding scale.   SubCutaneous every 6 hours  lactated ringers. 1000 milliLiter(s) (75 mL/Hr) IV Continuous <Continuous>  levothyroxine Injectable 75 MICROGram(s) IV Push at bedtime  pantoprazole  Injectable 40 milliGRAM(s) IV Push daily  Parenteral Nutrition - Adult 1 Each (67 mL/Hr) TPN Continuous <Continuous>  Parenteral Nutrition - Adult 1 Each (67 mL/Hr) TPN Continuous <Continuous>    MEDICATIONS  (PRN):  HYDROmorphone  Injectable 0.5 milliGRAM(s) IV Push every 3 hours PRN Severe Pain (7 - 10)      Vital Signs Last 24 Hrs  T(C): 38.7 (02 Jul 2018 09:00), Max: 38.7 (02 Jul 2018 09:00)  T(F): 101.7 (02 Jul 2018 09:00), Max: 101.7 (02 Jul 2018 09:00)  HR: 71 (02 Jul 2018 08:06) (67 - 80)  BP: 147/87 (02 Jul 2018 08:06) (123/82 - 147/87)  BP(mean): --  RR: 17 (02 Jul 2018 08:06) (17 - 20)  SpO2: 97% (02 Jul 2018 08:06) (94% - 97%)    PHYSICAL EXAM:      Constitutional: A&Ox3    Respiratory: non labored breathing, no respiratory distress    Cardiovascular: NSR, RRR    Gastrointestinal: soft, mildly distended, mildly TTP diffusely       Genitourinary: voiding     Extremities: (-) edema                  I&O's Detail    01 Jul 2018 07:01  -  02 Jul 2018 07:00  --------------------------------------------------------  IN:    lactated ringers.: 1725 mL    TPN (Total Parenteral Nutrition): 1541 mL  Total IN: 3266 mL    OUT:  Total OUT: 0 mL    Total NET: 3266 mL      02 Jul 2018 07:01  -  02 Jul 2018 11:20  --------------------------------------------------------  IN:    lactated ringers.: 225 mL    Oral Fluid: 40 mL    TPN (Total Parenteral Nutrition): 201 mL  Total IN: 466 mL    OUT:    Voided: 850 mL  Total OUT: 850 mL    Total NET: -384 mL          LABS:                        12.0   11.8  )-----------( 523      ( 02 Jul 2018 10:01 )             36.7     07-02    133<L>  |  91<L>  |  14  ----------------------------<  82  5.1   |  27  |  0.88    Ca    9.1      02 Jul 2018 10:01  Phos  3.3     07-02  Mg     2.2     07-02    TPro  7.3  /  Alb  3.3  /  TBili  1.2  /  DBili  x   /  AST  43<H>  /  ALT  49<H>  /  AlkPhos  76  07-02          RADIOLOGY & ADDITIONAL STUDIES:

## 2018-07-03 LAB
ALBUMIN SERPL ELPH-MCNC: 3.4 G/DL — SIGNIFICANT CHANGE UP (ref 3.3–5)
ALP SERPL-CCNC: 94 U/L — SIGNIFICANT CHANGE UP (ref 40–120)
ALT FLD-CCNC: 60 U/L — HIGH (ref 10–45)
ANION GAP SERPL CALC-SCNC: 15 MMOL/L — SIGNIFICANT CHANGE UP (ref 5–17)
AST SERPL-CCNC: 41 U/L — HIGH (ref 10–40)
BILIRUB SERPL-MCNC: 0.8 MG/DL — SIGNIFICANT CHANGE UP (ref 0.2–1.2)
BUN SERPL-MCNC: 18 MG/DL — SIGNIFICANT CHANGE UP (ref 7–23)
CALCIUM SERPL-MCNC: 9.4 MG/DL — SIGNIFICANT CHANGE UP (ref 8.4–10.5)
CHLORIDE SERPL-SCNC: 92 MMOL/L — LOW (ref 96–108)
CO2 SERPL-SCNC: 23 MMOL/L — SIGNIFICANT CHANGE UP (ref 22–31)
CREAT SERPL-MCNC: 1.02 MG/DL — SIGNIFICANT CHANGE UP (ref 0.5–1.3)
GLUCOSE BLDC GLUCOMTR-MCNC: 107 MG/DL — HIGH (ref 70–99)
GLUCOSE BLDC GLUCOMTR-MCNC: 111 MG/DL — HIGH (ref 70–99)
GLUCOSE BLDC GLUCOMTR-MCNC: 116 MG/DL — HIGH (ref 70–99)
GLUCOSE BLDC GLUCOMTR-MCNC: 126 MG/DL — HIGH (ref 70–99)
GLUCOSE SERPL-MCNC: 146 MG/DL — HIGH (ref 70–99)
HCT VFR BLD CALC: 39.4 % — SIGNIFICANT CHANGE UP (ref 39–50)
HGB BLD-MCNC: 12.8 G/DL — LOW (ref 13–17)
MAGNESIUM SERPL-MCNC: 2.3 MG/DL — SIGNIFICANT CHANGE UP (ref 1.6–2.6)
MCHC RBC-ENTMCNC: 29.2 PG — SIGNIFICANT CHANGE UP (ref 27–34)
MCHC RBC-ENTMCNC: 32.5 G/DL — SIGNIFICANT CHANGE UP (ref 32–36)
MCV RBC AUTO: 90 FL — SIGNIFICANT CHANGE UP (ref 80–100)
PHOSPHATE SERPL-MCNC: 2.6 MG/DL — SIGNIFICANT CHANGE UP (ref 2.5–4.5)
PLATELET # BLD AUTO: 637 K/UL — HIGH (ref 150–400)
POTASSIUM SERPL-MCNC: 4.7 MMOL/L — SIGNIFICANT CHANGE UP (ref 3.5–5.3)
POTASSIUM SERPL-SCNC: 4.7 MMOL/L — SIGNIFICANT CHANGE UP (ref 3.5–5.3)
PROT SERPL-MCNC: 8 G/DL — SIGNIFICANT CHANGE UP (ref 6–8.3)
RBC # BLD: 4.38 M/UL — SIGNIFICANT CHANGE UP (ref 4.2–5.8)
RBC # FLD: 14.5 % — SIGNIFICANT CHANGE UP (ref 10.3–16.9)
SODIUM SERPL-SCNC: 130 MMOL/L — LOW (ref 135–145)
WBC # BLD: 12.9 K/UL — HIGH (ref 3.8–10.5)
WBC # FLD AUTO: 12.9 K/UL — HIGH (ref 3.8–10.5)

## 2018-07-03 PROCEDURE — 74019 RADEX ABDOMEN 2 VIEWS: CPT | Mod: 26

## 2018-07-03 PROCEDURE — 74177 CT ABD & PELVIS W/CONTRAST: CPT | Mod: 26

## 2018-07-03 RX ORDER — I.V. FAT EMULSION 20 G/100ML
0.47 EMULSION INTRAVENOUS
Qty: 50 | Refills: 0 | Status: DISCONTINUED | OUTPATIENT
Start: 2018-07-03 | End: 2018-07-03

## 2018-07-03 RX ORDER — ELECTROLYTE SOLUTION,INJ
1 VIAL (ML) INTRAVENOUS
Qty: 0 | Refills: 0 | Status: DISCONTINUED | OUTPATIENT
Start: 2018-07-03 | End: 2018-07-03

## 2018-07-03 RX ORDER — SODIUM CHLORIDE 9 MG/ML
1000 INJECTION INTRAMUSCULAR; INTRAVENOUS; SUBCUTANEOUS
Qty: 0 | Refills: 0 | Status: DISCONTINUED | OUTPATIENT
Start: 2018-07-03 | End: 2018-07-06

## 2018-07-03 RX ORDER — IOHEXOL 300 MG/ML
30 INJECTION, SOLUTION INTRAVENOUS ONCE
Qty: 0 | Refills: 0 | Status: COMPLETED | OUTPATIENT
Start: 2018-07-03 | End: 2018-07-03

## 2018-07-03 RX ORDER — ACETAMINOPHEN 500 MG
1000 TABLET ORAL ONCE
Qty: 0 | Refills: 0 | Status: COMPLETED | OUTPATIENT
Start: 2018-07-03 | End: 2018-07-03

## 2018-07-03 RX ORDER — ONDANSETRON 8 MG/1
4 TABLET, FILM COATED ORAL ONCE
Qty: 0 | Refills: 0 | Status: COMPLETED | OUTPATIENT
Start: 2018-07-03 | End: 2018-07-03

## 2018-07-03 RX ADMIN — Medication 400 MILLIGRAM(S): at 06:49

## 2018-07-03 RX ADMIN — ONDANSETRON 4 MILLIGRAM(S): 8 TABLET, FILM COATED ORAL at 03:15

## 2018-07-03 RX ADMIN — HEPARIN SODIUM 5000 UNIT(S): 5000 INJECTION INTRAVENOUS; SUBCUTANEOUS at 21:26

## 2018-07-03 RX ADMIN — HYDROMORPHONE HYDROCHLORIDE 0.5 MILLIGRAM(S): 2 INJECTION INTRAMUSCULAR; INTRAVENOUS; SUBCUTANEOUS at 15:05

## 2018-07-03 RX ADMIN — HYDROMORPHONE HYDROCHLORIDE 0.5 MILLIGRAM(S): 2 INJECTION INTRAMUSCULAR; INTRAVENOUS; SUBCUTANEOUS at 05:23

## 2018-07-03 RX ADMIN — HYDROMORPHONE HYDROCHLORIDE 0.5 MILLIGRAM(S): 2 INJECTION INTRAMUSCULAR; INTRAVENOUS; SUBCUTANEOUS at 01:01

## 2018-07-03 RX ADMIN — HYDROMORPHONE HYDROCHLORIDE 0.5 MILLIGRAM(S): 2 INJECTION INTRAMUSCULAR; INTRAVENOUS; SUBCUTANEOUS at 08:14

## 2018-07-03 RX ADMIN — HYDROMORPHONE HYDROCHLORIDE 0.5 MILLIGRAM(S): 2 INJECTION INTRAMUSCULAR; INTRAVENOUS; SUBCUTANEOUS at 11:45

## 2018-07-03 RX ADMIN — HYDROMORPHONE HYDROCHLORIDE 0.5 MILLIGRAM(S): 2 INJECTION INTRAMUSCULAR; INTRAVENOUS; SUBCUTANEOUS at 01:16

## 2018-07-03 RX ADMIN — HYDROMORPHONE HYDROCHLORIDE 0.5 MILLIGRAM(S): 2 INJECTION INTRAMUSCULAR; INTRAVENOUS; SUBCUTANEOUS at 18:02

## 2018-07-03 RX ADMIN — HYDROMORPHONE HYDROCHLORIDE 0.5 MILLIGRAM(S): 2 INJECTION INTRAMUSCULAR; INTRAVENOUS; SUBCUTANEOUS at 11:27

## 2018-07-03 RX ADMIN — HYDROMORPHONE HYDROCHLORIDE 0.5 MILLIGRAM(S): 2 INJECTION INTRAMUSCULAR; INTRAVENOUS; SUBCUTANEOUS at 08:30

## 2018-07-03 RX ADMIN — Medication 1 EACH: at 17:49

## 2018-07-03 RX ADMIN — HYDROMORPHONE HYDROCHLORIDE 0.5 MILLIGRAM(S): 2 INJECTION INTRAMUSCULAR; INTRAVENOUS; SUBCUTANEOUS at 14:47

## 2018-07-03 RX ADMIN — HYDROMORPHONE HYDROCHLORIDE 0.5 MILLIGRAM(S): 2 INJECTION INTRAMUSCULAR; INTRAVENOUS; SUBCUTANEOUS at 21:30

## 2018-07-03 RX ADMIN — HEPARIN SODIUM 5000 UNIT(S): 5000 INJECTION INTRAVENOUS; SUBCUTANEOUS at 13:11

## 2018-07-03 RX ADMIN — Medication 100 UNIT(S): at 11:27

## 2018-07-03 RX ADMIN — SODIUM CHLORIDE 75 MILLILITER(S): 9 INJECTION INTRAMUSCULAR; INTRAVENOUS; SUBCUTANEOUS at 06:49

## 2018-07-03 RX ADMIN — IOHEXOL 30 MILLILITER(S): 300 INJECTION, SOLUTION INTRAVENOUS at 16:48

## 2018-07-03 RX ADMIN — HYDROMORPHONE HYDROCHLORIDE 0.5 MILLIGRAM(S): 2 INJECTION INTRAMUSCULAR; INTRAVENOUS; SUBCUTANEOUS at 05:13

## 2018-07-03 RX ADMIN — HYDROMORPHONE HYDROCHLORIDE 0.5 MILLIGRAM(S): 2 INJECTION INTRAMUSCULAR; INTRAVENOUS; SUBCUTANEOUS at 22:00

## 2018-07-03 RX ADMIN — HEPARIN SODIUM 5000 UNIT(S): 5000 INJECTION INTRAVENOUS; SUBCUTANEOUS at 05:13

## 2018-07-03 RX ADMIN — SODIUM CHLORIDE 75 MILLILITER(S): 9 INJECTION INTRAMUSCULAR; INTRAVENOUS; SUBCUTANEOUS at 13:11

## 2018-07-03 RX ADMIN — Medication 75 MICROGRAM(S): at 21:28

## 2018-07-03 RX ADMIN — HYDROMORPHONE HYDROCHLORIDE 0.5 MILLIGRAM(S): 2 INJECTION INTRAMUSCULAR; INTRAVENOUS; SUBCUTANEOUS at 18:17

## 2018-07-03 RX ADMIN — PANTOPRAZOLE SODIUM 40 MILLIGRAM(S): 20 TABLET, DELAYED RELEASE ORAL at 11:26

## 2018-07-03 NOTE — PROGRESS NOTE ADULT - SUBJECTIVE AND OBJECTIVE BOX
On interval followup, the left arm PICC site is clean, dry, non-inflamed and non-tender. Afebrile. Notes, cultures and progress are followed.

## 2018-07-03 NOTE — PROGRESS NOTE ADULT - ASSESSMENT
58M hx duodenitis/gastritis, gallstones, admitted with acute pancreatitis, suspect possible gallstone pancreatitis now s/p ERCP w/ sphincterotomy complicated SBO     NPO  IVF/TPN  OOBA/IS  SCDs/SQH  AM labs

## 2018-07-03 NOTE — PROGRESS NOTE ADULT - SUBJECTIVE AND OBJECTIVE BOX
SUBJECTIVE: States feels better then last night. admits to increase abdominal distension without N/V. admits to BF. Pain well controlled      MEDICATIONS  (STANDING):  fat emulsion (Plant Based) 20% Infusion 0.465 Gm/kG/Day (31.242 mL/Hr) IV Continuous <Continuous>  heparin  flush 100 Units/mL Injectable 100 Unit(s) IV Push every other day  heparin  Injectable 5000 Unit(s) SubCutaneous every 8 hours  insulin lispro (HumaLOG) corrective regimen sliding scale.   SubCutaneous every 6 hours  levothyroxine Injectable 75 MICROGram(s) IV Push at bedtime  pantoprazole  Injectable 40 milliGRAM(s) IV Push daily  Parenteral Nutrition - Adult 1 Each (67 mL/Hr) TPN Continuous <Continuous>  Parenteral Nutrition - Adult 1 Each (67 mL/Hr) TPN Continuous <Continuous>  sodium chloride 0.9%. 1000 milliLiter(s) (75 mL/Hr) IV Continuous <Continuous>    MEDICATIONS  (PRN):  HYDROmorphone  Injectable 0.5 milliGRAM(s) IV Push every 3 hours PRN Severe Pain (7 - 10)      Vital Signs Last 24 Hrs  T(C): 36.5 (2018 09:09), Max: 39.4 (2018 16:20)  T(F): 97.7 (2018 09:09), Max: 102.9 (2018 16:20)  HR: 74 (2018 09:09) (61 - 112)  BP: 110/77 (2018 09:09) (103/66 - 152/92)  BP(mean): --  RR: 17 (2018 09:09) (17 - 18)  SpO2: 95% (2018 09:09) (94% - 96%)    PHYSICAL EXAM:      Constitutional: A&Ox3    Respiratory: non labored breathing, no respiratory distress    Cardiovascular: NSR, RRR    Gastrointestinal: Soft, Mildly distended, non tender       Genitourinary: Voiding     Extremities: (-) edema                  I&O's Detail    2018 07:01  -  2018 07:00  --------------------------------------------------------  IN:    fat emulsion (Plant Based) 20% Infusion: 279 mL    lactated ringers.: 1725 mL    Oral Fluid: 260 mL    TPN (Total Parenteral Nutrition): 1608 mL  Total IN: 3872 mL    OUT:    Voided: 2050 mL  Total OUT: 2050 mL    Total NET: 1822 mL          LABS:                        12.8   12.9  )-----------( 637      ( 2018 06:31 )             39.4     07-03    130<L>  |  92<L>  |  18  ----------------------------<  146<H>  4.7   |  23  |  1.02    Ca    9.4      2018 06:31  Phos  2.6     07-03  Mg     2.3     07-03    TPro  8.0  /  Alb  3.4  /  TBili  0.8  /  DBili  x   /  AST  41<H>  /  ALT  60<H>  /  AlkPhos  94  07-03      Urinalysis Basic - ( 2018 18:30 )    Color: Yellow / Appearance: Clear / S.015 / pH: x  Gluc: x / Ketone: NEGATIVE  / Bili: Negative / Urobili: 2.0 E.U./dL   Blood: x / Protein: NEGATIVE mg/dL / Nitrite: NEGATIVE   Leuk Esterase: NEGATIVE / RBC: x / WBC x   Sq Epi: x / Non Sq Epi: x / Bacteria: x        RADIOLOGY & ADDITIONAL STUDIES:

## 2018-07-04 LAB
ALBUMIN SERPL ELPH-MCNC: 2.8 G/DL — LOW (ref 3.3–5)
ALP SERPL-CCNC: 70 U/L — SIGNIFICANT CHANGE UP (ref 40–120)
ALT FLD-CCNC: 41 U/L — SIGNIFICANT CHANGE UP (ref 10–45)
ANION GAP SERPL CALC-SCNC: 11 MMOL/L — SIGNIFICANT CHANGE UP (ref 5–17)
AST SERPL-CCNC: 25 U/L — SIGNIFICANT CHANGE UP (ref 10–40)
BILIRUB SERPL-MCNC: 0.5 MG/DL — SIGNIFICANT CHANGE UP (ref 0.2–1.2)
BUN SERPL-MCNC: 15 MG/DL — SIGNIFICANT CHANGE UP (ref 7–23)
CALCIUM SERPL-MCNC: 8.5 MG/DL — SIGNIFICANT CHANGE UP (ref 8.4–10.5)
CHLORIDE SERPL-SCNC: 95 MMOL/L — LOW (ref 96–108)
CO2 SERPL-SCNC: 24 MMOL/L — SIGNIFICANT CHANGE UP (ref 22–31)
CREAT SERPL-MCNC: 0.9 MG/DL — SIGNIFICANT CHANGE UP (ref 0.5–1.3)
GLUCOSE BLDC GLUCOMTR-MCNC: 105 MG/DL — HIGH (ref 70–99)
GLUCOSE BLDC GLUCOMTR-MCNC: 141 MG/DL — HIGH (ref 70–99)
GLUCOSE SERPL-MCNC: 127 MG/DL — HIGH (ref 70–99)
HCT VFR BLD CALC: 35.7 % — LOW (ref 39–50)
HGB BLD-MCNC: 11.6 G/DL — LOW (ref 13–17)
MAGNESIUM SERPL-MCNC: 2.1 MG/DL — SIGNIFICANT CHANGE UP (ref 1.6–2.6)
MCHC RBC-ENTMCNC: 29.4 PG — SIGNIFICANT CHANGE UP (ref 27–34)
MCHC RBC-ENTMCNC: 32.5 G/DL — SIGNIFICANT CHANGE UP (ref 32–36)
MCV RBC AUTO: 90.6 FL — SIGNIFICANT CHANGE UP (ref 80–100)
PHOSPHATE SERPL-MCNC: 2.7 MG/DL — SIGNIFICANT CHANGE UP (ref 2.5–4.5)
PLATELET # BLD AUTO: 562 K/UL — HIGH (ref 150–400)
POTASSIUM SERPL-MCNC: 4.6 MMOL/L — SIGNIFICANT CHANGE UP (ref 3.5–5.3)
POTASSIUM SERPL-SCNC: 4.6 MMOL/L — SIGNIFICANT CHANGE UP (ref 3.5–5.3)
PROT SERPL-MCNC: 6.8 G/DL — SIGNIFICANT CHANGE UP (ref 6–8.3)
RBC # BLD: 3.94 M/UL — LOW (ref 4.2–5.8)
RBC # FLD: 14.5 % — SIGNIFICANT CHANGE UP (ref 10.3–16.9)
SODIUM SERPL-SCNC: 130 MMOL/L — LOW (ref 135–145)
WBC # BLD: 13.8 K/UL — HIGH (ref 3.8–10.5)
WBC # FLD AUTO: 13.8 K/UL — HIGH (ref 3.8–10.5)

## 2018-07-04 RX ORDER — KETOROLAC TROMETHAMINE 30 MG/ML
15 SYRINGE (ML) INJECTION EVERY 6 HOURS
Qty: 0 | Refills: 0 | Status: DISCONTINUED | OUTPATIENT
Start: 2018-07-04 | End: 2018-07-06

## 2018-07-04 RX ORDER — ELECTROLYTE SOLUTION,INJ
1 VIAL (ML) INTRAVENOUS
Qty: 0 | Refills: 0 | Status: DISCONTINUED | OUTPATIENT
Start: 2018-07-04 | End: 2018-07-04

## 2018-07-04 RX ORDER — I.V. FAT EMULSION 20 G/100ML
0.47 EMULSION INTRAVENOUS
Qty: 50 | Refills: 0 | Status: DISCONTINUED | OUTPATIENT
Start: 2018-07-04 | End: 2018-07-04

## 2018-07-04 RX ORDER — ACETAMINOPHEN 500 MG
1000 TABLET ORAL ONCE
Qty: 0 | Refills: 0 | Status: COMPLETED | OUTPATIENT
Start: 2018-07-04 | End: 2018-07-04

## 2018-07-04 RX ADMIN — HYDROMORPHONE HYDROCHLORIDE 0.5 MILLIGRAM(S): 2 INJECTION INTRAMUSCULAR; INTRAVENOUS; SUBCUTANEOUS at 16:45

## 2018-07-04 RX ADMIN — Medication 15 MILLIGRAM(S): at 17:38

## 2018-07-04 RX ADMIN — HYDROMORPHONE HYDROCHLORIDE 0.5 MILLIGRAM(S): 2 INJECTION INTRAMUSCULAR; INTRAVENOUS; SUBCUTANEOUS at 16:12

## 2018-07-04 RX ADMIN — Medication 15 MILLIGRAM(S): at 13:30

## 2018-07-04 RX ADMIN — HYDROMORPHONE HYDROCHLORIDE 0.5 MILLIGRAM(S): 2 INJECTION INTRAMUSCULAR; INTRAVENOUS; SUBCUTANEOUS at 23:30

## 2018-07-04 RX ADMIN — HYDROMORPHONE HYDROCHLORIDE 0.5 MILLIGRAM(S): 2 INJECTION INTRAMUSCULAR; INTRAVENOUS; SUBCUTANEOUS at 12:57

## 2018-07-04 RX ADMIN — Medication 15 MILLIGRAM(S): at 12:54

## 2018-07-04 RX ADMIN — Medication 1 EACH: at 17:35

## 2018-07-04 RX ADMIN — HYDROMORPHONE HYDROCHLORIDE 0.5 MILLIGRAM(S): 2 INJECTION INTRAMUSCULAR; INTRAVENOUS; SUBCUTANEOUS at 06:38

## 2018-07-04 RX ADMIN — HEPARIN SODIUM 5000 UNIT(S): 5000 INJECTION INTRAVENOUS; SUBCUTANEOUS at 14:20

## 2018-07-04 RX ADMIN — HYDROMORPHONE HYDROCHLORIDE 0.5 MILLIGRAM(S): 2 INJECTION INTRAMUSCULAR; INTRAVENOUS; SUBCUTANEOUS at 09:42

## 2018-07-04 RX ADMIN — Medication 75 MICROGRAM(S): at 21:24

## 2018-07-04 RX ADMIN — PANTOPRAZOLE SODIUM 40 MILLIGRAM(S): 20 TABLET, DELAYED RELEASE ORAL at 12:54

## 2018-07-04 RX ADMIN — I.V. FAT EMULSION 20.8 GM/KG/DAY: 20 EMULSION INTRAVENOUS at 21:26

## 2018-07-04 RX ADMIN — Medication 15 MILLIGRAM(S): at 18:20

## 2018-07-04 RX ADMIN — Medication 400 MILLIGRAM(S): at 05:00

## 2018-07-04 RX ADMIN — HYDROMORPHONE HYDROCHLORIDE 0.5 MILLIGRAM(S): 2 INJECTION INTRAMUSCULAR; INTRAVENOUS; SUBCUTANEOUS at 19:20

## 2018-07-04 RX ADMIN — HEPARIN SODIUM 5000 UNIT(S): 5000 INJECTION INTRAVENOUS; SUBCUTANEOUS at 21:23

## 2018-07-04 RX ADMIN — HYDROMORPHONE HYDROCHLORIDE 0.5 MILLIGRAM(S): 2 INJECTION INTRAMUSCULAR; INTRAVENOUS; SUBCUTANEOUS at 06:11

## 2018-07-04 RX ADMIN — HYDROMORPHONE HYDROCHLORIDE 0.5 MILLIGRAM(S): 2 INJECTION INTRAMUSCULAR; INTRAVENOUS; SUBCUTANEOUS at 13:30

## 2018-07-04 RX ADMIN — HEPARIN SODIUM 5000 UNIT(S): 5000 INJECTION INTRAVENOUS; SUBCUTANEOUS at 05:00

## 2018-07-04 RX ADMIN — HYDROMORPHONE HYDROCHLORIDE 0.5 MILLIGRAM(S): 2 INJECTION INTRAMUSCULAR; INTRAVENOUS; SUBCUTANEOUS at 10:15

## 2018-07-04 RX ADMIN — HYDROMORPHONE HYDROCHLORIDE 0.5 MILLIGRAM(S): 2 INJECTION INTRAMUSCULAR; INTRAVENOUS; SUBCUTANEOUS at 23:05

## 2018-07-04 NOTE — PROGRESS NOTE ADULT - SUBJECTIVE AND OBJECTIVE BOX
SUBJECTIVE: Patient ap    heparin  flush 100 Units/mL Injectable 100 Unit(s) IV Push every other day  heparin  Injectable 5000 Unit(s) SubCutaneous every 8 hours      Vital Signs Last 24 Hrs  T(C): 36.7 (04 Jul 2018 09:00), Max: 38.4 (04 Jul 2018 04:06)  T(F): 98.1 (04 Jul 2018 09:00), Max: 101.2 (04 Jul 2018 04:06)  HR: 72 (04 Jul 2018 08:30) (66 - 88)  BP: 137/76 (04 Jul 2018 08:30) (123/91 - 140/89)  BP(mean): 102 (04 Jul 2018 08:30) (90 - 110)  RR: 18 (04 Jul 2018 08:30) (17 - 20)  SpO2: 96% (04 Jul 2018 08:30) (93% - 98%)    General: NAD, resting comfortably in bed  Pulm: Nonlabored breathing, no respiratory distress  Abd: soft, NT/ND.  Extrem: WWP, no edema, SCDs in place      LABS:                        11.6   13.8  )-----------( 562      ( 04 Jul 2018 06:25 )             35.7     07-04    130<L>  |  95<L>  |  15  ----------------------------<  127<H>  4.6   |  24  |  0.90    Ca    8.5      04 Jul 2018 06:25  Phos  2.7     07-04  Mg     2.1     07-04    TPro  6.8  /  Alb  2.8<L>  /  TBili  0.5  /  DBili  x   /  AST  25  /  ALT  41  /  AlkPhos  70  07-04 SUBJECTIVE: Patient appears comfortable this morning but complained of epigastric pain overnight. Denies N/V, CT ab/pelvis was completed last night. Pt is passing flatus, denies having BM. Pt was febrile with Tmx to 101.2, improved after IV Tylenol.    heparin  flush 100 Units/mL Injectable 100 Unit(s) IV Push every other day  heparin  Injectable 5000 Unit(s) SubCutaneous every 8 hours      Vital Signs Last 24 Hrs  T(C): 36.7 (04 Jul 2018 09:00), Max: 38.4 (04 Jul 2018 04:06)  T(F): 98.1 (04 Jul 2018 09:00), Max: 101.2 (04 Jul 2018 04:06)  HR: 72 (04 Jul 2018 08:30) (66 - 88)  BP: 137/76 (04 Jul 2018 08:30) (123/91 - 140/89)  BP(mean): 102 (04 Jul 2018 08:30) (90 - 110)  RR: 18 (04 Jul 2018 08:30) (17 - 20)  SpO2: 96% (04 Jul 2018 08:30) (93% - 98%)    General: NAD, resting comfortably in bed  Pulm: Nonlabored breathing, no respiratory distress  Abd: soft (improved since yesterday), NT, soft distension improved since yesterday, no rebounding, no guarding, no discoloration    CT Abdomen and Pelvis w/ Oral Cont and w/ IV Cont   Acute pancreatitis with findings consistent with areas of necrosis in the   head of the pancreas, as well as peripancreatic fat necrosis. No   drainable fluid collections.          LABS:                        11.6   13.8  )-----------( 562      ( 04 Jul 2018 06:25 )             35.7     07-04    130<L>  |  95<L>  |  15  ----------------------------<  127<H>  4.6   |  24  |  0.90    Ca    8.5      04 Jul 2018 06:25  Phos  2.7     07-04  Mg     2.1     07-04    TPro  6.8  /  Alb  2.8<L>  /  TBili  0.5  /  DBili  x   /  AST  25  /  ALT  41  /  AlkPhos  70  07-04

## 2018-07-04 NOTE — PROGRESS NOTE ADULT - ASSESSMENT
A/P: 58M hx duodenitis/gastritis, gallstones, admitted with acute pancreatitis, suspect possible gallstone pancreatitis now s/p ERCP w/ sphincterotomy     Pain control with Toradol x2 days (7/4-7/6)  NPO with Ice chips /IVF/TPN  OOBA/IS  SCDs/SQH  AM labs

## 2018-07-05 LAB
ALBUMIN SERPL ELPH-MCNC: 2.7 G/DL — LOW (ref 3.3–5)
ALP SERPL-CCNC: 65 U/L — SIGNIFICANT CHANGE UP (ref 40–120)
ALT FLD-CCNC: 35 U/L — SIGNIFICANT CHANGE UP (ref 10–45)
ANION GAP SERPL CALC-SCNC: 11 MMOL/L — SIGNIFICANT CHANGE UP (ref 5–17)
AST SERPL-CCNC: 20 U/L — SIGNIFICANT CHANGE UP (ref 10–40)
BILIRUB SERPL-MCNC: 0.4 MG/DL — SIGNIFICANT CHANGE UP (ref 0.2–1.2)
BUN SERPL-MCNC: 11 MG/DL — SIGNIFICANT CHANGE UP (ref 7–23)
CALCIUM SERPL-MCNC: 8.5 MG/DL — SIGNIFICANT CHANGE UP (ref 8.4–10.5)
CHLORIDE SERPL-SCNC: 94 MMOL/L — LOW (ref 96–108)
CO2 SERPL-SCNC: 24 MMOL/L — SIGNIFICANT CHANGE UP (ref 22–31)
CREAT SERPL-MCNC: 0.82 MG/DL — SIGNIFICANT CHANGE UP (ref 0.5–1.3)
GLUCOSE SERPL-MCNC: 139 MG/DL — HIGH (ref 70–99)
HCT VFR BLD CALC: 32.9 % — LOW (ref 39–50)
HGB BLD-MCNC: 10.8 G/DL — LOW (ref 13–17)
MAGNESIUM SERPL-MCNC: 2.2 MG/DL — SIGNIFICANT CHANGE UP (ref 1.6–2.6)
MCHC RBC-ENTMCNC: 29.3 PG — SIGNIFICANT CHANGE UP (ref 27–34)
MCHC RBC-ENTMCNC: 32.8 G/DL — SIGNIFICANT CHANGE UP (ref 32–36)
MCV RBC AUTO: 89.4 FL — SIGNIFICANT CHANGE UP (ref 80–100)
PHOSPHATE SERPL-MCNC: 2.7 MG/DL — SIGNIFICANT CHANGE UP (ref 2.5–4.5)
PLATELET # BLD AUTO: 533 K/UL — HIGH (ref 150–400)
POTASSIUM SERPL-MCNC: 4.1 MMOL/L — SIGNIFICANT CHANGE UP (ref 3.5–5.3)
POTASSIUM SERPL-SCNC: 4.1 MMOL/L — SIGNIFICANT CHANGE UP (ref 3.5–5.3)
PROT SERPL-MCNC: 6.5 G/DL — SIGNIFICANT CHANGE UP (ref 6–8.3)
RBC # BLD: 3.68 M/UL — LOW (ref 4.2–5.8)
RBC # FLD: 14.3 % — SIGNIFICANT CHANGE UP (ref 10.3–16.9)
SODIUM SERPL-SCNC: 129 MMOL/L — LOW (ref 135–145)
WBC # BLD: 12.6 K/UL — HIGH (ref 3.8–10.5)
WBC # FLD AUTO: 12.6 K/UL — HIGH (ref 3.8–10.5)

## 2018-07-05 RX ORDER — I.V. FAT EMULSION 20 G/100ML
50 EMULSION INTRAVENOUS ONCE
Qty: 0 | Refills: 0 | Status: COMPLETED | OUTPATIENT
Start: 2018-07-05 | End: 2018-07-05

## 2018-07-05 RX ORDER — ELECTROLYTE SOLUTION,INJ
1 VIAL (ML) INTRAVENOUS
Qty: 0 | Refills: 0 | Status: DISCONTINUED | OUTPATIENT
Start: 2018-07-05 | End: 2018-07-05

## 2018-07-05 RX ORDER — ACETAMINOPHEN 500 MG
1000 TABLET ORAL ONCE
Qty: 0 | Refills: 0 | Status: COMPLETED | OUTPATIENT
Start: 2018-07-05 | End: 2018-07-05

## 2018-07-05 RX ORDER — ELECTROLYTE SOLUTION,INJ
1 VIAL (ML) INTRAVENOUS
Qty: 0 | Refills: 0 | Status: DISCONTINUED | OUTPATIENT
Start: 2018-07-05 | End: 2018-07-06

## 2018-07-05 RX ADMIN — Medication 15 MILLIGRAM(S): at 17:32

## 2018-07-05 RX ADMIN — HEPARIN SODIUM 5000 UNIT(S): 5000 INJECTION INTRAVENOUS; SUBCUTANEOUS at 05:10

## 2018-07-05 RX ADMIN — HYDROMORPHONE HYDROCHLORIDE 0.5 MILLIGRAM(S): 2 INJECTION INTRAMUSCULAR; INTRAVENOUS; SUBCUTANEOUS at 16:25

## 2018-07-05 RX ADMIN — Medication 100 UNIT(S): at 11:55

## 2018-07-05 RX ADMIN — Medication 15 MILLIGRAM(S): at 17:24

## 2018-07-05 RX ADMIN — HYDROMORPHONE HYDROCHLORIDE 0.5 MILLIGRAM(S): 2 INJECTION INTRAMUSCULAR; INTRAVENOUS; SUBCUTANEOUS at 05:30

## 2018-07-05 RX ADMIN — Medication 15 MILLIGRAM(S): at 12:10

## 2018-07-05 RX ADMIN — HYDROMORPHONE HYDROCHLORIDE 0.5 MILLIGRAM(S): 2 INJECTION INTRAMUSCULAR; INTRAVENOUS; SUBCUTANEOUS at 09:07

## 2018-07-05 RX ADMIN — HYDROMORPHONE HYDROCHLORIDE 0.5 MILLIGRAM(S): 2 INJECTION INTRAMUSCULAR; INTRAVENOUS; SUBCUTANEOUS at 19:31

## 2018-07-05 RX ADMIN — HEPARIN SODIUM 5000 UNIT(S): 5000 INJECTION INTRAVENOUS; SUBCUTANEOUS at 22:13

## 2018-07-05 RX ADMIN — HYDROMORPHONE HYDROCHLORIDE 0.5 MILLIGRAM(S): 2 INJECTION INTRAMUSCULAR; INTRAVENOUS; SUBCUTANEOUS at 12:40

## 2018-07-05 RX ADMIN — Medication 400 MILLIGRAM(S): at 04:48

## 2018-07-05 RX ADMIN — I.V. FAT EMULSION 20.83 GRAM(S): 20 EMULSION INTRAVENOUS at 22:13

## 2018-07-05 RX ADMIN — PANTOPRAZOLE SODIUM 40 MILLIGRAM(S): 20 TABLET, DELAYED RELEASE ORAL at 11:55

## 2018-07-05 RX ADMIN — HYDROMORPHONE HYDROCHLORIDE 0.5 MILLIGRAM(S): 2 INJECTION INTRAMUSCULAR; INTRAVENOUS; SUBCUTANEOUS at 15:51

## 2018-07-05 RX ADMIN — HEPARIN SODIUM 5000 UNIT(S): 5000 INJECTION INTRAVENOUS; SUBCUTANEOUS at 14:15

## 2018-07-05 RX ADMIN — HYDROMORPHONE HYDROCHLORIDE 0.5 MILLIGRAM(S): 2 INJECTION INTRAMUSCULAR; INTRAVENOUS; SUBCUTANEOUS at 19:09

## 2018-07-05 RX ADMIN — Medication 75 MICROGRAM(S): at 23:29

## 2018-07-05 RX ADMIN — HYDROMORPHONE HYDROCHLORIDE 0.5 MILLIGRAM(S): 2 INJECTION INTRAMUSCULAR; INTRAVENOUS; SUBCUTANEOUS at 22:13

## 2018-07-05 RX ADMIN — HYDROMORPHONE HYDROCHLORIDE 0.5 MILLIGRAM(S): 2 INJECTION INTRAMUSCULAR; INTRAVENOUS; SUBCUTANEOUS at 12:55

## 2018-07-05 RX ADMIN — HYDROMORPHONE HYDROCHLORIDE 0.5 MILLIGRAM(S): 2 INJECTION INTRAMUSCULAR; INTRAVENOUS; SUBCUTANEOUS at 04:48

## 2018-07-05 RX ADMIN — SODIUM CHLORIDE 75 MILLILITER(S): 9 INJECTION INTRAMUSCULAR; INTRAVENOUS; SUBCUTANEOUS at 05:10

## 2018-07-05 RX ADMIN — Medication 15 MILLIGRAM(S): at 11:54

## 2018-07-05 RX ADMIN — HYDROMORPHONE HYDROCHLORIDE 0.5 MILLIGRAM(S): 2 INJECTION INTRAMUSCULAR; INTRAVENOUS; SUBCUTANEOUS at 09:22

## 2018-07-05 RX ADMIN — HYDROMORPHONE HYDROCHLORIDE 0.5 MILLIGRAM(S): 2 INJECTION INTRAMUSCULAR; INTRAVENOUS; SUBCUTANEOUS at 22:43

## 2018-07-05 NOTE — PROGRESS NOTE ADULT - SUBJECTIVE AND OBJECTIVE BOX
o/n: Tmax 101.8, 1g tylenol given.  7/4: added toradol, npo sips/chips, CT final read shows pancreatic necrosis       SUBJECTIVE: Patient feeling well, c/o some epigastric pain which is being managed. Patient seen and examined bedside by chief resident.    heparin  flush 100 Units/mL Injectable 100 Unit(s) IV Push every other day  heparin  Injectable 5000 Unit(s) SubCutaneous every 8 hours      Vital Signs Last 24 Hrs  T(C): 36.2 (05 Jul 2018 07:00), Max: 38.8 (05 Jul 2018 05:00)  T(F): 97.2 (05 Jul 2018 07:00), Max: 101.9 (05 Jul 2018 05:00)  HR: 82 (05 Jul 2018 04:43) (68 - 82)  BP: 141/80 (05 Jul 2018 04:43) (129/80 - 154/83)  BP(mean): 106 (05 Jul 2018 04:43) (91 - 111)  RR: 17 (05 Jul 2018 04:43) (17 - 19)  SpO2: 94% (05 Jul 2018 04:43) (94% - 97%)  I&O's Detail    04 Jul 2018 07:01  -  05 Jul 2018 07:00  --------------------------------------------------------  IN:    fat emulsion (Plant Based) 20% Infusion: 208 mL    sodium chloride 0.9%.: 750 mL    Solution: 100 mL    TPN (Total Parenteral Nutrition): 670 mL  Total IN: 1728 mL    OUT:    Voided: 1000 mL  Total OUT: 1000 mL    Total NET: 728 mL          General: NAD, resting comfortably in bed  C/V: NSR  Pulm: Nonlabored breathing, no respiratory distress  Abd: soft, slightly tender to palpation in epigastric region, nondistended        LABS:                        10.8   12.6  )-----------( 533      ( 05 Jul 2018 05:48 )             32.9     07-05    129<L>  |  94<L>  |  11  ----------------------------<  139<H>  4.1   |  24  |  0.82    Ca    8.5      05 Jul 2018 05:51  Phos  2.7     07-05  Mg     2.2     07-05    TPro  6.5  /  Alb  2.7<L>  /  TBili  0.4  /  DBili  x   /  AST  20  /  ALT  35  /  AlkPhos  65  07-05

## 2018-07-05 NOTE — PROGRESS NOTE ADULT - ASSESSMENT
58M hx duodenitis/gastritis, gallstones, admitted with acute pancreatitis, suspect possible gallstone pancreatitis now s/p ERCP w/ sphincterotomy 58M hx duodenitis/gastritis, gallstones, admitted with acute pancreatitis, suspect possible gallstone pancreatitis now s/p ERCP w/ sphincterotomy   CLD/TPN  Pain/nausea control  OOBA/IS  SCDs/SQH  AM labs  Step down to regional

## 2018-07-06 LAB
ANION GAP SERPL CALC-SCNC: 12 MMOL/L — SIGNIFICANT CHANGE UP (ref 5–17)
BUN SERPL-MCNC: 16 MG/DL — SIGNIFICANT CHANGE UP (ref 7–23)
CALCIUM SERPL-MCNC: 8.6 MG/DL — SIGNIFICANT CHANGE UP (ref 8.4–10.5)
CHLORIDE SERPL-SCNC: 99 MMOL/L — SIGNIFICANT CHANGE UP (ref 96–108)
CHOLEST SERPL-MCNC: 85 MG/DL — SIGNIFICANT CHANGE UP (ref 10–199)
CO2 SERPL-SCNC: 23 MMOL/L — SIGNIFICANT CHANGE UP (ref 22–31)
CREAT SERPL-MCNC: 0.72 MG/DL — SIGNIFICANT CHANGE UP (ref 0.5–1.3)
GLUCOSE SERPL-MCNC: 133 MG/DL — HIGH (ref 70–99)
HCT VFR BLD CALC: 32.6 % — LOW (ref 39–50)
HDLC SERPL-MCNC: 9 MG/DL — LOW (ref 40–125)
HGB BLD-MCNC: 10.8 G/DL — LOW (ref 13–17)
LIPID PNL WITH DIRECT LDL SERPL: 38 MG/DL — SIGNIFICANT CHANGE UP
MAGNESIUM SERPL-MCNC: 2.2 MG/DL — SIGNIFICANT CHANGE UP (ref 1.6–2.6)
MCHC RBC-ENTMCNC: 30 PG — SIGNIFICANT CHANGE UP (ref 27–34)
MCHC RBC-ENTMCNC: 33.1 G/DL — SIGNIFICANT CHANGE UP (ref 32–36)
MCV RBC AUTO: 90.6 FL — SIGNIFICANT CHANGE UP (ref 80–100)
PHOSPHATE SERPL-MCNC: 3.7 MG/DL — SIGNIFICANT CHANGE UP (ref 2.5–4.5)
PLATELET # BLD AUTO: 532 K/UL — HIGH (ref 150–400)
POTASSIUM SERPL-MCNC: 4.2 MMOL/L — SIGNIFICANT CHANGE UP (ref 3.5–5.3)
POTASSIUM SERPL-SCNC: 4.2 MMOL/L — SIGNIFICANT CHANGE UP (ref 3.5–5.3)
RBC # BLD: 3.6 M/UL — LOW (ref 4.2–5.8)
RBC # FLD: 14.5 % — SIGNIFICANT CHANGE UP (ref 10.3–16.9)
SODIUM SERPL-SCNC: 134 MMOL/L — LOW (ref 135–145)
TOTAL CHOLESTEROL/HDL RATIO MEASUREMENT: 9.4 RATIO — SIGNIFICANT CHANGE UP (ref 3.4–9.6)
TRIGL SERPL-MCNC: 191 MG/DL — HIGH (ref 10–149)
WBC # BLD: 12.6 K/UL — HIGH (ref 3.8–10.5)
WBC # FLD AUTO: 12.6 K/UL — HIGH (ref 3.8–10.5)

## 2018-07-06 RX ORDER — PANTOPRAZOLE SODIUM 20 MG/1
40 TABLET, DELAYED RELEASE ORAL
Qty: 0 | Refills: 0 | Status: DISCONTINUED | OUTPATIENT
Start: 2018-07-06 | End: 2018-07-11

## 2018-07-06 RX ORDER — OXYCODONE AND ACETAMINOPHEN 5; 325 MG/1; MG/1
1 TABLET ORAL EVERY 4 HOURS
Qty: 0 | Refills: 0 | Status: DISCONTINUED | OUTPATIENT
Start: 2018-07-06 | End: 2018-07-11

## 2018-07-06 RX ORDER — LEVOTHYROXINE SODIUM 125 MCG
150 TABLET ORAL DAILY
Qty: 0 | Refills: 0 | Status: DISCONTINUED | OUTPATIENT
Start: 2018-07-06 | End: 2018-07-11

## 2018-07-06 RX ORDER — ELECTROLYTE SOLUTION,INJ
1 VIAL (ML) INTRAVENOUS
Qty: 0 | Refills: 0 | Status: DISCONTINUED | OUTPATIENT
Start: 2018-07-06 | End: 2018-07-06

## 2018-07-06 RX ORDER — OXYCODONE AND ACETAMINOPHEN 5; 325 MG/1; MG/1
2 TABLET ORAL EVERY 4 HOURS
Qty: 0 | Refills: 0 | Status: DISCONTINUED | OUTPATIENT
Start: 2018-07-06 | End: 2018-07-11

## 2018-07-06 RX ORDER — ACETAMINOPHEN 500 MG
1000 TABLET ORAL ONCE
Qty: 0 | Refills: 0 | Status: COMPLETED | OUTPATIENT
Start: 2018-07-06 | End: 2018-07-06

## 2018-07-06 RX ORDER — ACETAMINOPHEN 500 MG
650 TABLET ORAL EVERY 6 HOURS
Qty: 0 | Refills: 0 | Status: DISCONTINUED | OUTPATIENT
Start: 2018-07-06 | End: 2018-07-11

## 2018-07-06 RX ADMIN — OXYCODONE AND ACETAMINOPHEN 2 TABLET(S): 5; 325 TABLET ORAL at 18:28

## 2018-07-06 RX ADMIN — OXYCODONE AND ACETAMINOPHEN 2 TABLET(S): 5; 325 TABLET ORAL at 15:27

## 2018-07-06 RX ADMIN — Medication 400 MILLIGRAM(S): at 00:40

## 2018-07-06 RX ADMIN — HYDROMORPHONE HYDROCHLORIDE 0.5 MILLIGRAM(S): 2 INJECTION INTRAMUSCULAR; INTRAVENOUS; SUBCUTANEOUS at 07:26

## 2018-07-06 RX ADMIN — PANTOPRAZOLE SODIUM 40 MILLIGRAM(S): 20 TABLET, DELAYED RELEASE ORAL at 14:27

## 2018-07-06 RX ADMIN — HEPARIN SODIUM 5000 UNIT(S): 5000 INJECTION INTRAVENOUS; SUBCUTANEOUS at 05:09

## 2018-07-06 RX ADMIN — Medication 15 MILLIGRAM(S): at 05:39

## 2018-07-06 RX ADMIN — Medication 150 MICROGRAM(S): at 14:26

## 2018-07-06 RX ADMIN — OXYCODONE AND ACETAMINOPHEN 2 TABLET(S): 5; 325 TABLET ORAL at 23:26

## 2018-07-06 RX ADMIN — OXYCODONE AND ACETAMINOPHEN 2 TABLET(S): 5; 325 TABLET ORAL at 19:30

## 2018-07-06 RX ADMIN — Medication 1 EACH: at 18:28

## 2018-07-06 RX ADMIN — Medication 15 MILLIGRAM(S): at 00:22

## 2018-07-06 RX ADMIN — HYDROMORPHONE HYDROCHLORIDE 0.5 MILLIGRAM(S): 2 INJECTION INTRAMUSCULAR; INTRAVENOUS; SUBCUTANEOUS at 01:44

## 2018-07-06 RX ADMIN — HYDROMORPHONE HYDROCHLORIDE 0.5 MILLIGRAM(S): 2 INJECTION INTRAMUSCULAR; INTRAVENOUS; SUBCUTANEOUS at 01:14

## 2018-07-06 RX ADMIN — OXYCODONE AND ACETAMINOPHEN 2 TABLET(S): 5; 325 TABLET ORAL at 10:28

## 2018-07-06 RX ADMIN — Medication 15 MILLIGRAM(S): at 05:09

## 2018-07-06 RX ADMIN — HEPARIN SODIUM 5000 UNIT(S): 5000 INJECTION INTRAVENOUS; SUBCUTANEOUS at 21:51

## 2018-07-06 RX ADMIN — OXYCODONE AND ACETAMINOPHEN 2 TABLET(S): 5; 325 TABLET ORAL at 11:20

## 2018-07-06 RX ADMIN — OXYCODONE AND ACETAMINOPHEN 2 TABLET(S): 5; 325 TABLET ORAL at 22:26

## 2018-07-06 RX ADMIN — HEPARIN SODIUM 5000 UNIT(S): 5000 INJECTION INTRAVENOUS; SUBCUTANEOUS at 14:26

## 2018-07-06 RX ADMIN — Medication 15 MILLIGRAM(S): at 00:52

## 2018-07-06 RX ADMIN — HYDROMORPHONE HYDROCHLORIDE 0.5 MILLIGRAM(S): 2 INJECTION INTRAMUSCULAR; INTRAVENOUS; SUBCUTANEOUS at 08:00

## 2018-07-06 RX ADMIN — HYDROMORPHONE HYDROCHLORIDE 0.5 MILLIGRAM(S): 2 INJECTION INTRAMUSCULAR; INTRAVENOUS; SUBCUTANEOUS at 04:16

## 2018-07-06 RX ADMIN — OXYCODONE AND ACETAMINOPHEN 2 TABLET(S): 5; 325 TABLET ORAL at 14:27

## 2018-07-06 RX ADMIN — HYDROMORPHONE HYDROCHLORIDE 0.5 MILLIGRAM(S): 2 INJECTION INTRAMUSCULAR; INTRAVENOUS; SUBCUTANEOUS at 04:46

## 2018-07-06 NOTE — PROGRESS NOTE ADULT - SUBJECTIVE AND OBJECTIVE BOX
o/n: HARSHA, tolerating CLD.  7/5: advanced to CLD, xfer to regional    SUBJECTIVE: Patient feeling well, slightly bloated, passing flatus. Patient seen and examined bedside by chief resident.    heparin  flush 100 Units/mL Injectable 100 Unit(s) IV Push every other day  heparin  Injectable 5000 Unit(s) SubCutaneous every 8 hours      Vital Signs Last 24 Hrs  T(C): 37.4 (06 Jul 2018 08:36), Max: 38.6 (06 Jul 2018 00:30)  T(F): 99.3 (06 Jul 2018 08:36), Max: 101.4 (06 Jul 2018 00:30)  HR: 70 (06 Jul 2018 08:36) (63 - 79)  BP: 137/84 (06 Jul 2018 08:36) (116/70 - 159/71)  BP(mean): --  RR: 17 (06 Jul 2018 08:36) (17 - 18)  SpO2: 98% (06 Jul 2018 08:36) (94% - 98%)  I&O's Detail    05 Jul 2018 07:01  -  06 Jul 2018 07:00  --------------------------------------------------------  IN:    Fat Emulsion 20%: 250 mL    Oral Fluid: 280 mL    sodium chloride 0.9%: 950 mL    TPN (Total Parenteral Nutrition): 1139 mL  Total IN: 2619 mL    OUT:    Voided: 3050 mL  Total OUT: 3050 mL    Total NET: -431 mL          General: NAD, resting comfortably in bed  C/V: NSR  Pulm: Nonlabored breathing, no respiratory distress  Abd: soft with slight distension, nontender          LABS:                        10.8   12.6  )-----------( 532      ( 06 Jul 2018 06:53 )             32.6     07-06    134<L>  |  99  |  16  ----------------------------<  133<H>  4.2   |  23  |  0.72    Ca    8.6      06 Jul 2018 06:53  Phos  3.7     07-06  Mg     2.2     07-06    TPro  6.5  /  Alb  2.7<L>  /  TBili  0.4  /  DBili  x   /  AST  20  /  ALT  35  /  AlkPhos  65  07-05

## 2018-07-06 NOTE — PROGRESS NOTE ADULT - ASSESSMENT
58M hx duodenitis/gastritis, gallstones, admitted with acute pancreatitis, suspect possible gallstone pancreatitis now s/p ERCP w/ sphincterotomy     Soft low fat diet/TPN  Pain/nausea control  OOBA/IS  SCDs/SQH  AM labs  Will begin to wean TPN if patient tolerates soft diet

## 2018-07-06 NOTE — CHART NOTE - NSCHARTNOTEFT_GEN_A_CORE
Admitting Diagnosis:   Patient is a 58y old  Male who presents with a chief complaint of Acute pancreatitis (18 Jun 2018 22:02)      PAST MEDICAL & SURGICAL HISTORY:  PUD (peptic ulcer disease)  No significant past surgical history      Current Nutrition Order: Clear liquids  TPN: 400g Dex, 112g AA, 50g lipids (2308kcal, 1.3g pro/kg IBW, GIR 2.58)  -Dex slowly increasing. Goal is 430g Dex.    PO Intake: Good (%) [   ]  Fair (50-75%) [   ] Poor (<25%) [  x ]   Observed ~8oz consumed at bedside.     GI Issues:   Denies N/V.  Distension however pt reports it is going down.  Reports general discomfort and pain but manageable. Not directly after drinking.    Pain:  c/o abdominal pain -finds relief w/ pain meds.    Skin Integrity: WDL per flowsheet.     Labs:   07-06    134<L>  |  99  |  16  ----------------------------<  133<H>  4.2   |  23  |  0.72    Ca    8.6      06 Jul 2018 06:53  Phos  3.7     07-06  Mg     2.2     07-06    TPro  6.5  /  Alb  2.7<L>  /  TBili  0.4  /  DBili  x   /  AST  20  /  ALT  35  /  AlkPhos  65  07-05    CAPILLARY BLOOD GLUCOSE          Medications:  MEDICATIONS  (STANDING):  heparin  flush 100 Units/mL Injectable 100 Unit(s) IV Push every other day  heparin  Injectable 5000 Unit(s) SubCutaneous every 8 hours  levothyroxine 150 MICROGram(s) Oral daily  pantoprazole    Tablet 40 milliGRAM(s) Oral before breakfast  Parenteral Nutrition - Adult 1 Each (67 mL/Hr) TPN Continuous <Continuous>  Parenteral Nutrition - Adult 1 Each (67 mL/Hr) TPN Continuous <Continuous>    MEDICATIONS  (PRN):  oxyCODONE    5 mG/acetaminophen 325 mG 1 Tablet(s) Oral every 4 hours PRN Moderate Pain (4 - 6)  oxyCODONE    5 mG/acetaminophen 325 mG 2 Tablet(s) Oral every 4 hours PRN Severe Pain (7 - 10)      Weight:  6/23: 224lbs  6/18: 237lbs  Ht:6ft 2inches,IBW:190lbs+/-10%,BMI:30.2,124% of IBW.    Weight Change:   Pt appears to have had a 13lb wt loss since admission. Please taken new wt for accuracy.     Estimated energy needs:   IBW used for calculations as pt >120% of IBW   Nutrient needs based on [facility] standards of care for maintenance in adults.   2152-2583kcal/day (25-30kcal/kg)  86-103g pro/day (1-1.2g/kg)  2152-2583ml/day (25-30ml/kg)    Subjective:   57yo M w/ h/o duodenitis/gastritis, gallstones, admitted w/ acute pancreatitis, suspect possible gallstone pancreatitis now s/p ERCP w/ stone removal and sphincterotomy. Final CTs reading pancreatic necrosis. Pt on clears w/ good tolerance, denies N/V or pain directly after drinking. Reports drinking EnsureClears daily. Diet has since been advanced to soft, low fat. Discussed w/ pt taking solid foods very slow and monitoring how he feels afterwards. TPN ordered for 400g Dex, 112g AA, 50g lipids (2308kcal, 1.3g pro/kg IBW, GIR 2.58). Per team, TPN to wean as soft food is tolerated. Please consider calorie count/ nutrition assessment prior to weaning TPN to ensure adequate intake/tolerance. Pt expressed understanding of receiving kcal/pro via PN and purpose of low fat diet.    Previous Nutrition Diagnosis:   inadequate kcal/pro intake RT parenteral infusion insufficient to meet EER AEB pt meeting <75% estimated needs    Active [ x  ]  Resolved [   ]    If resolved, new PES:   inadequate oral intake RT minimal PO intake 2/2 clears AEB currently TPN dependant w/ recent diet advancement to low fat, soft      Goal: Pt to meet >75% estimated needs via most appropriate route. Nutrition support initiated w/in next 24-48 hrs.     Recommendations:  1) Recommend increasing macros: 430g Dex,112g AA,50g 20% lipids (2410kcal, 1.3g pro/kg, GIR 2.77)   >> Micronutrients, lytes, fluids per MD discretion.   2) Monitor BG Q6H and replete lytes PRN.   3) Continue on soft/low fat PO diet - encourage small, slow intake of solid foods for next few meals.  4) monitor bowel function   5) trend weights  6) Please consider calorie count/ nutrition assessment prior to weaning TPN to ensure adequate intake/tolerance.  7) Add on MVI once TPN is d/c'd    Education: Pt understanding of current diet orders and slow intake    Risk Level: High [  x ] Moderate [   ] Low [   ].

## 2018-07-06 NOTE — PROGRESS NOTE ADULT - SUBJECTIVE AND OBJECTIVE BOX
On interval followup, the left arm PICC site is clean, dry and non-inflamed/ non-tender.   range. Notes, cultures and progress are followed.

## 2018-07-07 LAB
ALBUMIN SERPL ELPH-MCNC: 3.2 G/DL — LOW (ref 3.3–5)
ALP SERPL-CCNC: 88 U/L — SIGNIFICANT CHANGE UP (ref 40–120)
ALT FLD-CCNC: 49 U/L — HIGH (ref 10–45)
ANION GAP SERPL CALC-SCNC: 13 MMOL/L — SIGNIFICANT CHANGE UP (ref 5–17)
AST SERPL-CCNC: 35 U/L — SIGNIFICANT CHANGE UP (ref 10–40)
BILIRUB SERPL-MCNC: 0.9 MG/DL — SIGNIFICANT CHANGE UP (ref 0.2–1.2)
BUN SERPL-MCNC: 17 MG/DL — SIGNIFICANT CHANGE UP (ref 7–23)
CALCIUM SERPL-MCNC: 9.2 MG/DL — SIGNIFICANT CHANGE UP (ref 8.4–10.5)
CHLORIDE SERPL-SCNC: 93 MMOL/L — LOW (ref 96–108)
CO2 SERPL-SCNC: 25 MMOL/L — SIGNIFICANT CHANGE UP (ref 22–31)
CREAT SERPL-MCNC: 0.88 MG/DL — SIGNIFICANT CHANGE UP (ref 0.5–1.3)
CULTURE RESULTS: SIGNIFICANT CHANGE UP
CULTURE RESULTS: SIGNIFICANT CHANGE UP
GLUCOSE SERPL-MCNC: 131 MG/DL — HIGH (ref 70–99)
HCT VFR BLD CALC: 34.5 % — LOW (ref 39–50)
HGB BLD-MCNC: 11.2 G/DL — LOW (ref 13–17)
MAGNESIUM SERPL-MCNC: 2.1 MG/DL — SIGNIFICANT CHANGE UP (ref 1.6–2.6)
MCHC RBC-ENTMCNC: 29.2 PG — SIGNIFICANT CHANGE UP (ref 27–34)
MCHC RBC-ENTMCNC: 32.5 G/DL — SIGNIFICANT CHANGE UP (ref 32–36)
MCV RBC AUTO: 90.1 FL — SIGNIFICANT CHANGE UP (ref 80–100)
PHOSPHATE SERPL-MCNC: 3.3 MG/DL — SIGNIFICANT CHANGE UP (ref 2.5–4.5)
PLATELET # BLD AUTO: 614 K/UL — HIGH (ref 150–400)
POTASSIUM SERPL-MCNC: 4.2 MMOL/L — SIGNIFICANT CHANGE UP (ref 3.5–5.3)
POTASSIUM SERPL-SCNC: 4.2 MMOL/L — SIGNIFICANT CHANGE UP (ref 3.5–5.3)
PROT SERPL-MCNC: 7.9 G/DL — SIGNIFICANT CHANGE UP (ref 6–8.3)
RBC # BLD: 3.83 M/UL — LOW (ref 4.2–5.8)
RBC # FLD: 14.4 % — SIGNIFICANT CHANGE UP (ref 10.3–16.9)
SODIUM SERPL-SCNC: 131 MMOL/L — LOW (ref 135–145)
SPECIMEN SOURCE: SIGNIFICANT CHANGE UP
SPECIMEN SOURCE: SIGNIFICANT CHANGE UP
WBC # BLD: 18 K/UL — HIGH (ref 3.8–10.5)
WBC # FLD AUTO: 18 K/UL — HIGH (ref 3.8–10.5)

## 2018-07-07 RX ORDER — OXYCODONE HYDROCHLORIDE 5 MG/1
5 TABLET ORAL ONCE
Qty: 0 | Refills: 0 | Status: DISCONTINUED | OUTPATIENT
Start: 2018-07-07 | End: 2018-07-07

## 2018-07-07 RX ORDER — ELECTROLYTE SOLUTION,INJ
1 VIAL (ML) INTRAVENOUS
Qty: 0 | Refills: 0 | Status: DISCONTINUED | OUTPATIENT
Start: 2018-07-07 | End: 2018-07-07

## 2018-07-07 RX ORDER — ACETAMINOPHEN 500 MG
1000 TABLET ORAL ONCE
Qty: 0 | Refills: 0 | Status: COMPLETED | OUTPATIENT
Start: 2018-07-07 | End: 2018-07-07

## 2018-07-07 RX ADMIN — OXYCODONE AND ACETAMINOPHEN 2 TABLET(S): 5; 325 TABLET ORAL at 11:00

## 2018-07-07 RX ADMIN — HEPARIN SODIUM 5000 UNIT(S): 5000 INJECTION INTRAVENOUS; SUBCUTANEOUS at 05:48

## 2018-07-07 RX ADMIN — Medication 1 EACH: at 18:04

## 2018-07-07 RX ADMIN — OXYCODONE AND ACETAMINOPHEN 2 TABLET(S): 5; 325 TABLET ORAL at 05:48

## 2018-07-07 RX ADMIN — Medication 400 MILLIGRAM(S): at 17:33

## 2018-07-07 RX ADMIN — PANTOPRAZOLE SODIUM 40 MILLIGRAM(S): 20 TABLET, DELAYED RELEASE ORAL at 05:48

## 2018-07-07 RX ADMIN — OXYCODONE HYDROCHLORIDE 5 MILLIGRAM(S): 5 TABLET ORAL at 20:02

## 2018-07-07 RX ADMIN — OXYCODONE AND ACETAMINOPHEN 2 TABLET(S): 5; 325 TABLET ORAL at 10:05

## 2018-07-07 RX ADMIN — OXYCODONE AND ACETAMINOPHEN 2 TABLET(S): 5; 325 TABLET ORAL at 07:36

## 2018-07-07 RX ADMIN — Medication 150 MICROGRAM(S): at 05:48

## 2018-07-07 RX ADMIN — HEPARIN SODIUM 5000 UNIT(S): 5000 INJECTION INTRAVENOUS; SUBCUTANEOUS at 21:57

## 2018-07-07 RX ADMIN — HEPARIN SODIUM 5000 UNIT(S): 5000 INJECTION INTRAVENOUS; SUBCUTANEOUS at 13:30

## 2018-07-07 RX ADMIN — OXYCODONE HYDROCHLORIDE 5 MILLIGRAM(S): 5 TABLET ORAL at 19:48

## 2018-07-07 RX ADMIN — Medication 100 UNIT(S): at 13:29

## 2018-07-07 NOTE — PROGRESS NOTE ADULT - ASSESSMENT
a/p: 58M with gallstone pancreatitis now s/p ERCP w/ sphincterotomy,     Soft low fat diet/TPN  Pain/nausea control  OOBA/IS  SCDs/SQH  AM labs  Continue TPN today, Will begin to wean TPN if patient tolerates soft diet

## 2018-07-07 NOTE — PROGRESS NOTE ADULT - SUBJECTIVE AND OBJECTIVE BOX
SUBJECTIVE: Pt appears comfortable with no complaints. He is tolerating soft diet well, denies N/V. He is passing flatus and having BM. Ambulating and voiding normally.    heparin  flush 100 Units/mL Injectable 100 Unit(s) IV Push every other day  heparin  Injectable 5000 Unit(s) SubCutaneous every 8 hours      Vital Signs Last 24 Hrs  T(C): 36.2 (07 Jul 2018 09:39), Max: 37.9 (06 Jul 2018 14:29)  T(F): 97.2 (07 Jul 2018 09:39), Max: 100.3 (06 Jul 2018 14:29)  HR: 78 (07 Jul 2018 09:39) (70 - 85)  BP: 107/73 (07 Jul 2018 09:39) (107/73 - 149/81)  BP(mean): --  RR: 16 (07 Jul 2018 09:39) (16 - 17)  SpO2: 97% (07 Jul 2018 09:39) (94% - 97%)    General: NAD, resting comfortably in bed  Pulm: Nonlabored breathing, no respiratory distress  Abd: soft, NT, mildly distended        LABS:                        11.2   18.0  )-----------( 614      ( 07 Jul 2018 06:47 )             34.5     07-07    131<L>  |  93<L>  |  17  ----------------------------<  131<H>  4.2   |  25  |  0.88    Ca    9.2      07 Jul 2018 06:47  Phos  3.3     07-07  Mg     2.1     07-07    TPro  7.9  /  Alb  3.2<L>  /  TBili  0.9  /  DBili  x   /  AST  35  /  ALT  49<H>  /  AlkPhos  88  07-07

## 2018-07-08 LAB
ALBUMIN SERPL ELPH-MCNC: 2.6 G/DL — LOW (ref 3.3–5)
ALP SERPL-CCNC: 85 U/L — SIGNIFICANT CHANGE UP (ref 40–120)
ALT FLD-CCNC: 38 U/L — SIGNIFICANT CHANGE UP (ref 10–45)
ANION GAP SERPL CALC-SCNC: 14 MMOL/L — SIGNIFICANT CHANGE UP (ref 5–17)
AST SERPL-CCNC: 26 U/L — SIGNIFICANT CHANGE UP (ref 10–40)
BILIRUB SERPL-MCNC: 0.8 MG/DL — SIGNIFICANT CHANGE UP (ref 0.2–1.2)
BUN SERPL-MCNC: 15 MG/DL — SIGNIFICANT CHANGE UP (ref 7–23)
CALCIUM SERPL-MCNC: 8.5 MG/DL — SIGNIFICANT CHANGE UP (ref 8.4–10.5)
CHLORIDE SERPL-SCNC: 92 MMOL/L — LOW (ref 96–108)
CO2 SERPL-SCNC: 28 MMOL/L — SIGNIFICANT CHANGE UP (ref 22–31)
CREAT SERPL-MCNC: 0.78 MG/DL — SIGNIFICANT CHANGE UP (ref 0.5–1.3)
GLUCOSE BLDC GLUCOMTR-MCNC: 119 MG/DL — HIGH (ref 70–99)
GLUCOSE BLDC GLUCOMTR-MCNC: 121 MG/DL — HIGH (ref 70–99)
GLUCOSE BLDC GLUCOMTR-MCNC: 129 MG/DL — HIGH (ref 70–99)
GLUCOSE SERPL-MCNC: 117 MG/DL — HIGH (ref 70–99)
HCT VFR BLD CALC: 32.4 % — LOW (ref 39–50)
HGB BLD-MCNC: 10.4 G/DL — LOW (ref 13–17)
MAGNESIUM SERPL-MCNC: 2 MG/DL — SIGNIFICANT CHANGE UP (ref 1.6–2.6)
MCHC RBC-ENTMCNC: 29.2 PG — SIGNIFICANT CHANGE UP (ref 27–34)
MCHC RBC-ENTMCNC: 32.1 G/DL — SIGNIFICANT CHANGE UP (ref 32–36)
MCV RBC AUTO: 91 FL — SIGNIFICANT CHANGE UP (ref 80–100)
PHOSPHATE SERPL-MCNC: 3.6 MG/DL — SIGNIFICANT CHANGE UP (ref 2.5–4.5)
PLATELET # BLD AUTO: 544 K/UL — HIGH (ref 150–400)
POTASSIUM SERPL-MCNC: 4 MMOL/L — SIGNIFICANT CHANGE UP (ref 3.5–5.3)
POTASSIUM SERPL-SCNC: 4 MMOL/L — SIGNIFICANT CHANGE UP (ref 3.5–5.3)
PROT SERPL-MCNC: 7.4 G/DL — SIGNIFICANT CHANGE UP (ref 6–8.3)
RBC # BLD: 3.56 M/UL — LOW (ref 4.2–5.8)
RBC # FLD: 14.6 % — SIGNIFICANT CHANGE UP (ref 10.3–16.9)
SODIUM SERPL-SCNC: 134 MMOL/L — LOW (ref 135–145)
WBC # BLD: 14 K/UL — HIGH (ref 3.8–10.5)
WBC # FLD AUTO: 14 K/UL — HIGH (ref 3.8–10.5)

## 2018-07-08 RX ORDER — ELECTROLYTE SOLUTION,INJ
1 VIAL (ML) INTRAVENOUS
Qty: 0 | Refills: 0 | Status: DISCONTINUED | OUTPATIENT
Start: 2018-07-08 | End: 2018-07-08

## 2018-07-08 RX ADMIN — OXYCODONE AND ACETAMINOPHEN 2 TABLET(S): 5; 325 TABLET ORAL at 10:05

## 2018-07-08 RX ADMIN — Medication 1 EACH: at 18:18

## 2018-07-08 RX ADMIN — HEPARIN SODIUM 5000 UNIT(S): 5000 INJECTION INTRAVENOUS; SUBCUTANEOUS at 21:42

## 2018-07-08 RX ADMIN — HEPARIN SODIUM 5000 UNIT(S): 5000 INJECTION INTRAVENOUS; SUBCUTANEOUS at 05:26

## 2018-07-08 RX ADMIN — OXYCODONE AND ACETAMINOPHEN 2 TABLET(S): 5; 325 TABLET ORAL at 09:35

## 2018-07-08 RX ADMIN — Medication 150 MICROGRAM(S): at 05:26

## 2018-07-08 RX ADMIN — HEPARIN SODIUM 5000 UNIT(S): 5000 INJECTION INTRAVENOUS; SUBCUTANEOUS at 13:31

## 2018-07-08 RX ADMIN — PANTOPRAZOLE SODIUM 40 MILLIGRAM(S): 20 TABLET, DELAYED RELEASE ORAL at 05:25

## 2018-07-08 RX ADMIN — OXYCODONE AND ACETAMINOPHEN 2 TABLET(S): 5; 325 TABLET ORAL at 15:04

## 2018-07-08 RX ADMIN — OXYCODONE AND ACETAMINOPHEN 2 TABLET(S): 5; 325 TABLET ORAL at 00:19

## 2018-07-08 RX ADMIN — OXYCODONE AND ACETAMINOPHEN 2 TABLET(S): 5; 325 TABLET ORAL at 14:34

## 2018-07-08 RX ADMIN — OXYCODONE AND ACETAMINOPHEN 2 TABLET(S): 5; 325 TABLET ORAL at 05:26

## 2018-07-08 RX ADMIN — OXYCODONE AND ACETAMINOPHEN 2 TABLET(S): 5; 325 TABLET ORAL at 06:26

## 2018-07-08 RX ADMIN — Medication 650 MILLIGRAM(S): at 17:31

## 2018-07-08 RX ADMIN — OXYCODONE AND ACETAMINOPHEN 2 TABLET(S): 5; 325 TABLET ORAL at 19:45

## 2018-07-08 RX ADMIN — OXYCODONE AND ACETAMINOPHEN 2 TABLET(S): 5; 325 TABLET ORAL at 19:15

## 2018-07-08 RX ADMIN — OXYCODONE AND ACETAMINOPHEN 2 TABLET(S): 5; 325 TABLET ORAL at 23:38

## 2018-07-08 RX ADMIN — OXYCODONE AND ACETAMINOPHEN 2 TABLET(S): 5; 325 TABLET ORAL at 01:19

## 2018-07-08 NOTE — PROGRESS NOTE ADULT - SUBJECTIVE AND OBJECTIVE BOX
ID: 58M with gallstone pancreatitis now s/p ERCP w/ sphincterotomy    24 Hour Events: Tolerating soft diet    SUBJECTIVE: Patient resting well and passing gas      OBJECTIVE:    Vital Signs:  Vital Signs Last 24 Hrs  T(C): 37.3 (08 Jul 2018 08:05), Max: 39.4 (07 Jul 2018 16:45)  T(F): 99.1 (08 Jul 2018 08:05), Max: 102.9 (07 Jul 2018 16:45)  HR: 84 (08 Jul 2018 08:05) (77 - 89)  BP: 136/80 (08 Jul 2018 08:05) (112/70 - 137/79)  BP(mean): --  RR: 17 (08 Jul 2018 08:05) (16 - 17)  SpO2: 97% (08 Jul 2018 08:05) (94% - 97%)    Physical Exam:  General: NAD  Pulmonary: Nonlabored breathing, no respiratory distress  Cardiovascular: No heaves/thrills  Abdominal: Soft, Nontender, Mildly distended  Extremities: WWP, no clubbing/cyanosis/edema  Neuro: AAO x3, no focal deficits    Lines/Drains/Tubes: None    Ins and Outs:  I&O's Summary    07 Jul 2018 07:01  -  08 Jul 2018 07:00  --------------------------------------------------------  IN: 2928 mL / OUT: 1550 mL / NET: 1378 mL    08 Jul 2018 07:01  -  08 Jul 2018 11:36  --------------------------------------------------------  IN: 230 mL / OUT: 400 mL / NET: -170 mL        Labs:                        10.4   14.0  )-----------( 544      ( 08 Jul 2018 06:14 )             32.4     07-08    134<L>  |  92<L>  |  15  ----------------------------<  117<H>  4.0   |  28  |  0.78    Ca    8.5      08 Jul 2018 06:12  Phos  3.6     07-08  Mg     2.0     07-08    TPro  7.4  /  Alb  2.6<L>  /  TBili  0.8  /  DBili  x   /  AST  26  /  ALT  38  /  AlkPhos  85  07-08        CAPILLARY BLOOD GLUCOSE      POCT Blood Glucose.: 119 mg/dL (08 Jul 2018 11:11)  POCT Blood Glucose.: 129 mg/dL (08 Jul 2018 08:09)    LIVER FUNCTIONS - ( 08 Jul 2018 06:12 )  Alb: 2.6 g/dL / Pro: 7.4 g/dL / ALK PHOS: 85 U/L / ALT: 38 U/L / AST: 26 U/L / GGT: x             Radiology & Additional Studies:

## 2018-07-09 LAB
ANION GAP SERPL CALC-SCNC: 11 MMOL/L — SIGNIFICANT CHANGE UP (ref 5–17)
BUN SERPL-MCNC: 16 MG/DL — SIGNIFICANT CHANGE UP (ref 7–23)
CALCIUM SERPL-MCNC: 8.8 MG/DL — SIGNIFICANT CHANGE UP (ref 8.4–10.5)
CHLORIDE SERPL-SCNC: 94 MMOL/L — LOW (ref 96–108)
CO2 SERPL-SCNC: 29 MMOL/L — SIGNIFICANT CHANGE UP (ref 22–31)
CREAT SERPL-MCNC: 0.75 MG/DL — SIGNIFICANT CHANGE UP (ref 0.5–1.3)
GLUCOSE BLDC GLUCOMTR-MCNC: 106 MG/DL — HIGH (ref 70–99)
GLUCOSE BLDC GLUCOMTR-MCNC: 110 MG/DL — HIGH (ref 70–99)
GLUCOSE SERPL-MCNC: 129 MG/DL — HIGH (ref 70–99)
HCT VFR BLD CALC: 29.7 % — LOW (ref 39–50)
HGB BLD-MCNC: 9.6 G/DL — LOW (ref 13–17)
MAGNESIUM SERPL-MCNC: 2.1 MG/DL — SIGNIFICANT CHANGE UP (ref 1.6–2.6)
MCHC RBC-ENTMCNC: 29.2 PG — SIGNIFICANT CHANGE UP (ref 27–34)
MCHC RBC-ENTMCNC: 32.3 G/DL — SIGNIFICANT CHANGE UP (ref 32–36)
MCV RBC AUTO: 90.3 FL — SIGNIFICANT CHANGE UP (ref 80–100)
PHOSPHATE SERPL-MCNC: 3.6 MG/DL — SIGNIFICANT CHANGE UP (ref 2.5–4.5)
PLATELET # BLD AUTO: 490 K/UL — HIGH (ref 150–400)
POTASSIUM SERPL-MCNC: 4.6 MMOL/L — SIGNIFICANT CHANGE UP (ref 3.5–5.3)
POTASSIUM SERPL-SCNC: 4.6 MMOL/L — SIGNIFICANT CHANGE UP (ref 3.5–5.3)
RBC # BLD: 3.29 M/UL — LOW (ref 4.2–5.8)
RBC # FLD: 14.4 % — SIGNIFICANT CHANGE UP (ref 10.3–16.9)
SODIUM SERPL-SCNC: 134 MMOL/L — LOW (ref 135–145)
WBC # BLD: 12.2 K/UL — HIGH (ref 3.8–10.5)
WBC # FLD AUTO: 12.2 K/UL — HIGH (ref 3.8–10.5)

## 2018-07-09 PROCEDURE — 99232 SBSQ HOSP IP/OBS MODERATE 35: CPT | Mod: GC

## 2018-07-09 RX ORDER — ELECTROLYTE SOLUTION,INJ
1 VIAL (ML) INTRAVENOUS
Qty: 0 | Refills: 0 | Status: DISCONTINUED | OUTPATIENT
Start: 2018-07-09 | End: 2018-07-10

## 2018-07-09 RX ORDER — ELECTROLYTE SOLUTION,INJ
1 VIAL (ML) INTRAVENOUS
Qty: 0 | Refills: 0 | Status: DISCONTINUED | OUTPATIENT
Start: 2018-07-09 | End: 2018-07-09

## 2018-07-09 RX ORDER — I.V. FAT EMULSION 20 G/100ML
0.47 EMULSION INTRAVENOUS
Qty: 50 | Refills: 0 | Status: DISCONTINUED | OUTPATIENT
Start: 2018-07-09 | End: 2018-07-09

## 2018-07-09 RX ADMIN — OXYCODONE AND ACETAMINOPHEN 2 TABLET(S): 5; 325 TABLET ORAL at 22:35

## 2018-07-09 RX ADMIN — PANTOPRAZOLE SODIUM 40 MILLIGRAM(S): 20 TABLET, DELAYED RELEASE ORAL at 05:34

## 2018-07-09 RX ADMIN — Medication 150 MICROGRAM(S): at 05:34

## 2018-07-09 RX ADMIN — HEPARIN SODIUM 5000 UNIT(S): 5000 INJECTION INTRAVENOUS; SUBCUTANEOUS at 21:41

## 2018-07-09 RX ADMIN — HEPARIN SODIUM 5000 UNIT(S): 5000 INJECTION INTRAVENOUS; SUBCUTANEOUS at 05:34

## 2018-07-09 RX ADMIN — OXYCODONE AND ACETAMINOPHEN 2 TABLET(S): 5; 325 TABLET ORAL at 13:57

## 2018-07-09 RX ADMIN — OXYCODONE AND ACETAMINOPHEN 2 TABLET(S): 5; 325 TABLET ORAL at 10:27

## 2018-07-09 RX ADMIN — OXYCODONE AND ACETAMINOPHEN 2 TABLET(S): 5; 325 TABLET ORAL at 18:34

## 2018-07-09 RX ADMIN — Medication 100 UNIT(S): at 13:12

## 2018-07-09 RX ADMIN — OXYCODONE AND ACETAMINOPHEN 2 TABLET(S): 5; 325 TABLET ORAL at 09:57

## 2018-07-09 RX ADMIN — Medication 1 EACH: at 18:04

## 2018-07-09 RX ADMIN — OXYCODONE AND ACETAMINOPHEN 2 TABLET(S): 5; 325 TABLET ORAL at 14:33

## 2018-07-09 RX ADMIN — HEPARIN SODIUM 5000 UNIT(S): 5000 INJECTION INTRAVENOUS; SUBCUTANEOUS at 13:56

## 2018-07-09 RX ADMIN — OXYCODONE AND ACETAMINOPHEN 2 TABLET(S): 5; 325 TABLET ORAL at 23:30

## 2018-07-09 RX ADMIN — OXYCODONE AND ACETAMINOPHEN 2 TABLET(S): 5; 325 TABLET ORAL at 05:35

## 2018-07-09 RX ADMIN — OXYCODONE AND ACETAMINOPHEN 2 TABLET(S): 5; 325 TABLET ORAL at 00:38

## 2018-07-09 RX ADMIN — OXYCODONE AND ACETAMINOPHEN 2 TABLET(S): 5; 325 TABLET ORAL at 20:00

## 2018-07-09 RX ADMIN — OXYCODONE AND ACETAMINOPHEN 2 TABLET(S): 5; 325 TABLET ORAL at 06:37

## 2018-07-09 NOTE — PROGRESS NOTE ADULT - ASSESSMENT
58M hx duodenitis/gastritis, gallstones, admitted with acute pancreatitis, suspect possible gallstone pancreatitis now s/p ERCP w/ sphincterotomy 6/21     Soft low fat diet/TPN  Pain/nausea control  Home synthroid  OOBA/IS  SCDs/SQH  AM labs

## 2018-07-09 NOTE — PROGRESS NOTE ADULT - SUBJECTIVE AND OBJECTIVE BOX
SUBJECTIVE:  Pt denies any complaints, minimal PO intake, denies N/V, Pain well controlled.       MEDICATIONS  (STANDING):  fat emulsion (Plant Based) 20% Infusion 0.466 Gm/kG/Day (31.31 mL/Hr) IV Continuous <Continuous>  heparin  flush 100 Units/mL Injectable 100 Unit(s) IV Push every other day  heparin  Injectable 5000 Unit(s) SubCutaneous every 8 hours  levothyroxine 150 MICROGram(s) Oral daily  pantoprazole    Tablet 40 milliGRAM(s) Oral before breakfast  Parenteral Nutrition - Adult 1 Each (67 mL/Hr) TPN Continuous <Continuous>  Parenteral Nutrition - Adult 1 Each (67 mL/Hr) TPN Continuous <Continuous>    MEDICATIONS  (PRN):  acetaminophen   Tablet 650 milliGRAM(s) Oral every 6 hours PRN For Temp greater than 38 C (100.4 F)  oxyCODONE    5 mG/acetaminophen 325 mG 1 Tablet(s) Oral every 4 hours PRN Moderate Pain (4 - 6)  oxyCODONE    5 mG/acetaminophen 325 mG 2 Tablet(s) Oral every 4 hours PRN Severe Pain (7 - 10)      Vital Signs Last 24 Hrs  T(C): 36.2 (09 Jul 2018 09:01), Max: 38.2 (08 Jul 2018 17:17)  T(F): 97.1 (09 Jul 2018 09:01), Max: 100.7 (08 Jul 2018 17:17)  HR: 74 (09 Jul 2018 09:01) (65 - 98)  BP: 113/75 (09 Jul 2018 09:01) (113/75 - 121/76)  BP(mean): --  RR: 16 (09 Jul 2018 09:01) (16 - 17)  SpO2: 98% (09 Jul 2018 09:01) (94% - 100%)    PHYSICAL EXAM:      Constitutional: A&Ox3    Respiratory: non labored breathing, no respiratory distress    Cardiovascular: NSR, RRR    Gastrointestinal: soft ND, decrease tenderness                  Genitourinary:  voiding     Extremities: (-) edema                  I&O's Detail    08 Jul 2018 07:01  -  09 Jul 2018 07:00  --------------------------------------------------------  IN:    Oral Fluid: 770 mL    TPN (Total Parenteral Nutrition): 1608 mL  Total IN: 2378 mL    OUT:    Voided: 1375 mL  Total OUT: 1375 mL    Total NET: 1003 mL      09 Jul 2018 07:01  -  09 Jul 2018 10:39  --------------------------------------------------------  IN:  Total IN: 0 mL    OUT:    Voided: 125 mL  Total OUT: 125 mL    Total NET: -125 mL          LABS:                        9.6    12.2  )-----------( 490      ( 09 Jul 2018 07:06 )             29.7     07-09    134<L>  |  94<L>  |  16  ----------------------------<  129<H>  4.6   |  29  |  0.75    Ca    8.8      09 Jul 2018 07:06  Phos  3.6     07-09  Mg     2.1     07-09    TPro  7.4  /  Alb  2.6<L>  /  TBili  0.8  /  DBili  x   /  AST  26  /  ALT  38  /  AlkPhos  85  07-08          RADIOLOGY & ADDITIONAL STUDIES:

## 2018-07-09 NOTE — PROGRESS NOTE ADULT - SUBJECTIVE AND OBJECTIVE BOX
On interval followup, the left arm PICC site is clean, dry and non-inflamed/ non-tender.  Afebrile. Notes, cultures, progress and plans are followed.

## 2018-07-10 LAB
ALBUMIN SERPL ELPH-MCNC: 2.7 G/DL — LOW (ref 3.3–5)
ALP SERPL-CCNC: 75 U/L — SIGNIFICANT CHANGE UP (ref 40–120)
ALT FLD-CCNC: 51 U/L — HIGH (ref 10–45)
ANION GAP SERPL CALC-SCNC: 14 MMOL/L — SIGNIFICANT CHANGE UP (ref 5–17)
AST SERPL-CCNC: 33 U/L — SIGNIFICANT CHANGE UP (ref 10–40)
BILIRUB SERPL-MCNC: 0.6 MG/DL — SIGNIFICANT CHANGE UP (ref 0.2–1.2)
BUN SERPL-MCNC: 16 MG/DL — SIGNIFICANT CHANGE UP (ref 7–23)
CALCIUM SERPL-MCNC: 9.1 MG/DL — SIGNIFICANT CHANGE UP (ref 8.4–10.5)
CHLORIDE SERPL-SCNC: 95 MMOL/L — LOW (ref 96–108)
CO2 SERPL-SCNC: 25 MMOL/L — SIGNIFICANT CHANGE UP (ref 22–31)
CREAT SERPL-MCNC: 0.75 MG/DL — SIGNIFICANT CHANGE UP (ref 0.5–1.3)
GLUCOSE BLDC GLUCOMTR-MCNC: 104 MG/DL — HIGH (ref 70–99)
GLUCOSE BLDC GLUCOMTR-MCNC: 107 MG/DL — HIGH (ref 70–99)
GLUCOSE BLDC GLUCOMTR-MCNC: 108 MG/DL — HIGH (ref 70–99)
GLUCOSE BLDC GLUCOMTR-MCNC: 97 MG/DL — SIGNIFICANT CHANGE UP (ref 70–99)
GLUCOSE SERPL-MCNC: 114 MG/DL — HIGH (ref 70–99)
HCT VFR BLD CALC: 30.8 % — LOW (ref 39–50)
HGB BLD-MCNC: 9.9 G/DL — LOW (ref 13–17)
MAGNESIUM SERPL-MCNC: 2 MG/DL — SIGNIFICANT CHANGE UP (ref 1.6–2.6)
MCHC RBC-ENTMCNC: 29.5 PG — SIGNIFICANT CHANGE UP (ref 27–34)
MCHC RBC-ENTMCNC: 32.1 G/DL — SIGNIFICANT CHANGE UP (ref 32–36)
MCV RBC AUTO: 91.7 FL — SIGNIFICANT CHANGE UP (ref 80–100)
PHOSPHATE SERPL-MCNC: 3.4 MG/DL — SIGNIFICANT CHANGE UP (ref 2.5–4.5)
PLATELET # BLD AUTO: 500 K/UL — HIGH (ref 150–400)
POTASSIUM SERPL-MCNC: 4.4 MMOL/L — SIGNIFICANT CHANGE UP (ref 3.5–5.3)
POTASSIUM SERPL-SCNC: 4.4 MMOL/L — SIGNIFICANT CHANGE UP (ref 3.5–5.3)
PROT SERPL-MCNC: 6.9 G/DL — SIGNIFICANT CHANGE UP (ref 6–8.3)
RBC # BLD: 3.36 M/UL — LOW (ref 4.2–5.8)
RBC # FLD: 14.5 % — SIGNIFICANT CHANGE UP (ref 10.3–16.9)
SODIUM SERPL-SCNC: 134 MMOL/L — LOW (ref 135–145)
WBC # BLD: 9.9 K/UL — SIGNIFICANT CHANGE UP (ref 3.8–10.5)
WBC # FLD AUTO: 9.9 K/UL — SIGNIFICANT CHANGE UP (ref 3.8–10.5)

## 2018-07-10 PROCEDURE — 99232 SBSQ HOSP IP/OBS MODERATE 35: CPT | Mod: GC

## 2018-07-10 RX ADMIN — HEPARIN SODIUM 5000 UNIT(S): 5000 INJECTION INTRAVENOUS; SUBCUTANEOUS at 06:30

## 2018-07-10 RX ADMIN — OXYCODONE AND ACETAMINOPHEN 2 TABLET(S): 5; 325 TABLET ORAL at 11:00

## 2018-07-10 RX ADMIN — OXYCODONE AND ACETAMINOPHEN 2 TABLET(S): 5; 325 TABLET ORAL at 18:29

## 2018-07-10 RX ADMIN — OXYCODONE AND ACETAMINOPHEN 2 TABLET(S): 5; 325 TABLET ORAL at 14:30

## 2018-07-10 RX ADMIN — OXYCODONE AND ACETAMINOPHEN 2 TABLET(S): 5; 325 TABLET ORAL at 22:33

## 2018-07-10 RX ADMIN — OXYCODONE AND ACETAMINOPHEN 2 TABLET(S): 5; 325 TABLET ORAL at 20:00

## 2018-07-10 RX ADMIN — PANTOPRAZOLE SODIUM 40 MILLIGRAM(S): 20 TABLET, DELAYED RELEASE ORAL at 06:30

## 2018-07-10 RX ADMIN — OXYCODONE AND ACETAMINOPHEN 2 TABLET(S): 5; 325 TABLET ORAL at 06:29

## 2018-07-10 RX ADMIN — OXYCODONE AND ACETAMINOPHEN 2 TABLET(S): 5; 325 TABLET ORAL at 07:18

## 2018-07-10 RX ADMIN — Medication 150 MICROGRAM(S): at 06:29

## 2018-07-10 RX ADMIN — OXYCODONE AND ACETAMINOPHEN 2 TABLET(S): 5; 325 TABLET ORAL at 15:30

## 2018-07-10 RX ADMIN — OXYCODONE AND ACETAMINOPHEN 2 TABLET(S): 5; 325 TABLET ORAL at 10:30

## 2018-07-10 RX ADMIN — OXYCODONE AND ACETAMINOPHEN 2 TABLET(S): 5; 325 TABLET ORAL at 23:15

## 2018-07-10 NOTE — PROGRESS NOTE ADULT - SUBJECTIVE AND OBJECTIVE BOX
SUBJECTIVE: No acute complaints overnight. Patient complains of mild abdominal pain. Denies n/v. Tolerating soft diet.     Vital Signs Last 24 Hrs  T(C): 36.4 (10 Jul 2018 09:22), Max: 37.3 (09 Jul 2018 20:35)  T(F): 97.5 (10 Jul 2018 09:22), Max: 99.2 (09 Jul 2018 20:35)  HR: 71 (10 Jul 2018 09:22) (71 - 87)  BP: 113/70 (10 Jul 2018 09:22) (113/70 - 129/79)  BP(mean): --  RR: 17 (10 Jul 2018 09:22) (16 - 18)  SpO2: 98% (10 Jul 2018 09:22) (95% - 98%)    General: NAD, resting comfortably in bed  Pulm: Nonlabored breathing, no respiratory distress  Abd: soft, NT/ND. Tenderness decreased.     LABS:                        9.9    9.9   )-----------( 500      ( 10 Jul 2018 07:36 )             30.8     07-10    134<L>  |  95<L>  |  16  ----------------------------<  114<H>  4.4   |  25  |  0.75    Ca    9.1      10 Jul 2018 07:36  Phos  3.4     07-10  Mg     2.0     07-10    TPro  6.9  /  Alb  2.7<L>  /  TBili  0.6  /  DBili  x   /  AST  33  /  ALT  51<H>  /  AlkPhos  75  07-10

## 2018-07-10 NOTE — CHART NOTE - NSCHARTNOTEFT_GEN_A_CORE
Admitting Diagnosis:   Patient is a 58y old  Male who presents with a chief complaint of Acute pancreatitis (18 Jun 2018 22:02)      PAST MEDICAL & SURGICAL HISTORY:  PUD (peptic ulcer disease)  No significant past surgical history      Current Nutrition Order: Low fat, soft diet  TPN d/c'd this am.    PO Intake: Good (%) [   ]  Fair (50-75%) [ x  ] Poor (<25%) [   ] ~75% meal  Per dietary recall, pt had 2 eggs, 1 banana and tea    GI Issues: Denies N/V, no pain associated w/ eating. Distension per flowsheet.     Pain: Pt reports pain is controlled w/ medical management.    Skin Integrity: WDL per flowsheet.     Labs:   07-10    134<L>  |  95<L>  |  16  ----------------------------<  114<H>  4.4   |  25  |  0.75    Ca    9.1      10 Jul 2018 07:36  Phos  3.4     07-10  Mg     2.0     07-10    TPro  6.9  /  Alb  2.7<L>  /  TBili  0.6  /  DBili  x   /  AST  33  /  ALT  51<H>  /  AlkPhos  75  07-10    CAPILLARY BLOOD GLUCOSE      POCT Blood Glucose.: 107 mg/dL (10 Jul 2018 11:55)  POCT Blood Glucose.: 97 mg/dL (10 Jul 2018 06:11)  POCT Blood Glucose.: 104 mg/dL (10 Jul 2018 00:09)  POCT Blood Glucose.: 110 mg/dL (09 Jul 2018 20:58)  POCT Blood Glucose.: 106 mg/dL (09 Jul 2018 16:40)      Medications:  MEDICATIONS  (STANDING):  heparin  flush 100 Units/mL Injectable 100 Unit(s) IV Push every other day  heparin  Injectable 5000 Unit(s) SubCutaneous every 8 hours  levothyroxine 150 MICROGram(s) Oral daily  pantoprazole    Tablet 40 milliGRAM(s) Oral before breakfast    MEDICATIONS  (PRN):  acetaminophen   Tablet 650 milliGRAM(s) Oral every 6 hours PRN For Temp greater than 38 C (100.4 F)  oxyCODONE    5 mG/acetaminophen 325 mG 1 Tablet(s) Oral every 4 hours PRN Moderate Pain (4 - 6)  oxyCODONE    5 mG/acetaminophen 325 mG 2 Tablet(s) Oral every 4 hours PRN Severe Pain (7 - 10)      Weight:  6/23: 224lbs  6/18: 237lbs  Ht:6ft 2inches,IBW:190lbs+/-10%,BMI:30.2,124% of IBW.    Weight Change:   Pt appears to have had a 13lb wt loss since admission. Please taken new wt for accuracy.     Estimated energy needs:   IBW used for calculations as pt >120% of IBW   Nutrient needs based on [facility] standards of care for maintenance in adults.   2152-2583kcal/day (25-30kcal/kg)  86-103g pro/day (1-1.2g/kg)  2152-2583ml/day (25-30ml/kg)    Subjective:   59yo M w/ h/o duodenitis/gastritis, gallstones, admitted w/ acute pancreatitis, suspect possible gallstone pancreatitis now s/p ERCP w/ stone removal and sphincterotomy. Final CTs reading pancreatic necrosis. Pt previously NPO w/ PN support. Diet has been slowly advanced to low fat, soft foods. TPN weaned and d/c'd this am. Pt endorses good appetite, consuming ~75% meals. Per dietary recall this am pt consumed 2 eggs, 1 banana and tea w/ good tolerance. Denies N/V. Pain is well controlled w/ pain meds. Pt had several inquiries about diet after discharge. Reviewed adhering to low fat diet for next 7-10 days and monitoring tolerance w/ higher fat foods once added back into diet (i.e, fish, nuts, cheese). Discussed importance of following an overall heart healthy diet 2/2 HDL and trilgycerides. Pt receptive and agreeable to information provided. RD to f/u w/ written edu. RD to follow for further questions/concerns.     Previous Nutrition Diagnosis:  inadequate oral intake RT minimal PO intake 2/2 clears AEB currently TPN dependant w/ recent diet advancement to low fat, soft    Active [  ]  Resolved [ x  ]    If resolved, new PES:   food and nutrition related knowledge deficit RT lack of prior exposure to edu on low fat/ cardiac TLC nutrition therapy AEB pt asking questions on diet for after d/c.       Goal: Pt to continue to meet >75% estimated needs via most appropriate route.   Pt to recall 2 main concepts from edu (fat content in foods, small & frequent meals to optimize intake if experiencing early satiety).     Recommendations:  1) Recommend DASH/TLC, soft - encourage small, slow intake of solid foods for next few meals.  2) monitor tolerance and bowel function   3) trend weights  4) Recommend PO MVI now that TPN is d/c'd    Education: Purpose of DASH/TLC diet, small frequent meals, monitoring tolerance    Risk Level: High [  ] Moderate [ x  ] Low [   ].

## 2018-07-10 NOTE — PROGRESS NOTE ADULT - ASSESSMENT
58M hx duodenitis/gastritis, gallstones, admitted with acute pancreatitis, suspect possible gallstone pancreatitis now s/p ERCP w/ sphincterotomy 6/21     Soft low fat diet  Pain/nausea control  Home synthroid  OOBA/IS  SCDs/SQH  AM labs

## 2018-07-11 ENCOUNTER — TRANSCRIPTION ENCOUNTER (OUTPATIENT)
Age: 58
End: 2018-07-11

## 2018-07-11 VITALS
HEART RATE: 73 BPM | SYSTOLIC BLOOD PRESSURE: 113 MMHG | DIASTOLIC BLOOD PRESSURE: 75 MMHG | RESPIRATION RATE: 17 BRPM | OXYGEN SATURATION: 96 %

## 2018-07-11 LAB
ALBUMIN SERPL ELPH-MCNC: 2.8 G/DL — LOW (ref 3.3–5)
ALP SERPL-CCNC: 86 U/L — SIGNIFICANT CHANGE UP (ref 40–120)
ALT FLD-CCNC: 50 U/L — HIGH (ref 10–45)
ANION GAP SERPL CALC-SCNC: 13 MMOL/L — SIGNIFICANT CHANGE UP (ref 5–17)
AST SERPL-CCNC: 27 U/L — SIGNIFICANT CHANGE UP (ref 10–40)
BILIRUB SERPL-MCNC: 0.6 MG/DL — SIGNIFICANT CHANGE UP (ref 0.2–1.2)
BUN SERPL-MCNC: 16 MG/DL — SIGNIFICANT CHANGE UP (ref 7–23)
CALCIUM SERPL-MCNC: 9.5 MG/DL — SIGNIFICANT CHANGE UP (ref 8.4–10.5)
CHLORIDE SERPL-SCNC: 95 MMOL/L — LOW (ref 96–108)
CO2 SERPL-SCNC: 26 MMOL/L — SIGNIFICANT CHANGE UP (ref 22–31)
CREAT SERPL-MCNC: 0.78 MG/DL — SIGNIFICANT CHANGE UP (ref 0.5–1.3)
GLUCOSE SERPL-MCNC: 91 MG/DL — SIGNIFICANT CHANGE UP (ref 70–99)
HCT VFR BLD CALC: 31.1 % — LOW (ref 39–50)
HGB BLD-MCNC: 10 G/DL — LOW (ref 13–17)
MAGNESIUM SERPL-MCNC: 2 MG/DL — SIGNIFICANT CHANGE UP (ref 1.6–2.6)
MCHC RBC-ENTMCNC: 28.8 PG — SIGNIFICANT CHANGE UP (ref 27–34)
MCHC RBC-ENTMCNC: 32.2 G/DL — SIGNIFICANT CHANGE UP (ref 32–36)
MCV RBC AUTO: 89.6 FL — SIGNIFICANT CHANGE UP (ref 80–100)
PHOSPHATE SERPL-MCNC: 4.2 MG/DL — SIGNIFICANT CHANGE UP (ref 2.5–4.5)
PLATELET # BLD AUTO: 525 K/UL — HIGH (ref 150–400)
POTASSIUM SERPL-MCNC: 4.5 MMOL/L — SIGNIFICANT CHANGE UP (ref 3.5–5.3)
POTASSIUM SERPL-SCNC: 4.5 MMOL/L — SIGNIFICANT CHANGE UP (ref 3.5–5.3)
PROT SERPL-MCNC: 7.5 G/DL — SIGNIFICANT CHANGE UP (ref 6–8.3)
RBC # BLD: 3.47 M/UL — LOW (ref 4.2–5.8)
RBC # FLD: 14.2 % — SIGNIFICANT CHANGE UP (ref 10.3–16.9)
SODIUM SERPL-SCNC: 134 MMOL/L — LOW (ref 135–145)
WBC # BLD: 8.8 K/UL — SIGNIFICANT CHANGE UP (ref 3.8–10.5)
WBC # FLD AUTO: 8.8 K/UL — SIGNIFICANT CHANGE UP (ref 3.8–10.5)

## 2018-07-11 PROCEDURE — 84100 ASSAY OF PHOSPHORUS: CPT

## 2018-07-11 PROCEDURE — C1889: CPT

## 2018-07-11 PROCEDURE — 80061 LIPID PANEL: CPT

## 2018-07-11 PROCEDURE — 82553 CREATINE MB FRACTION: CPT

## 2018-07-11 PROCEDURE — 74330 X-RAY BILE/PANC ENDOSCOPY: CPT

## 2018-07-11 PROCEDURE — 76705 ECHO EXAM OF ABDOMEN: CPT

## 2018-07-11 PROCEDURE — 82962 GLUCOSE BLOOD TEST: CPT

## 2018-07-11 PROCEDURE — 85610 PROTHROMBIN TIME: CPT

## 2018-07-11 PROCEDURE — 71045 X-RAY EXAM CHEST 1 VIEW: CPT

## 2018-07-11 PROCEDURE — 87086 URINE CULTURE/COLONY COUNT: CPT

## 2018-07-11 PROCEDURE — 83735 ASSAY OF MAGNESIUM: CPT

## 2018-07-11 PROCEDURE — 83036 HEMOGLOBIN GLYCOSYLATED A1C: CPT

## 2018-07-11 PROCEDURE — 85730 THROMBOPLASTIN TIME PARTIAL: CPT

## 2018-07-11 PROCEDURE — 96374 THER/PROPH/DIAG INJ IV PUSH: CPT

## 2018-07-11 PROCEDURE — 81003 URINALYSIS AUTO W/O SCOPE: CPT

## 2018-07-11 PROCEDURE — 86850 RBC ANTIBODY SCREEN: CPT

## 2018-07-11 PROCEDURE — 80048 BASIC METABOLIC PNL TOTAL CA: CPT

## 2018-07-11 PROCEDURE — 83605 ASSAY OF LACTIC ACID: CPT

## 2018-07-11 PROCEDURE — 86900 BLOOD TYPING SEROLOGIC ABO: CPT

## 2018-07-11 PROCEDURE — 82803 BLOOD GASES ANY COMBINATION: CPT

## 2018-07-11 PROCEDURE — 74019 RADEX ABDOMEN 2 VIEWS: CPT

## 2018-07-11 PROCEDURE — 85025 COMPLETE CBC W/AUTO DIFF WBC: CPT

## 2018-07-11 PROCEDURE — 74018 RADEX ABDOMEN 1 VIEW: CPT

## 2018-07-11 PROCEDURE — 82977 ASSAY OF GGT: CPT

## 2018-07-11 PROCEDURE — 74177 CT ABD & PELVIS W/CONTRAST: CPT

## 2018-07-11 PROCEDURE — 83690 ASSAY OF LIPASE: CPT

## 2018-07-11 PROCEDURE — 85027 COMPLETE CBC AUTOMATED: CPT

## 2018-07-11 PROCEDURE — 74181 MRI ABDOMEN W/O CONTRAST: CPT

## 2018-07-11 PROCEDURE — 96375 TX/PRO/DX INJ NEW DRUG ADDON: CPT

## 2018-07-11 PROCEDURE — 84484 ASSAY OF TROPONIN QUANT: CPT

## 2018-07-11 PROCEDURE — 86901 BLOOD TYPING SEROLOGIC RH(D): CPT

## 2018-07-11 PROCEDURE — 82550 ASSAY OF CK (CPK): CPT

## 2018-07-11 PROCEDURE — 80053 COMPREHEN METABOLIC PANEL: CPT

## 2018-07-11 PROCEDURE — 81001 URINALYSIS AUTO W/SCOPE: CPT

## 2018-07-11 PROCEDURE — C1769: CPT

## 2018-07-11 PROCEDURE — 36415 COLL VENOUS BLD VENIPUNCTURE: CPT

## 2018-07-11 PROCEDURE — 87040 BLOOD CULTURE FOR BACTERIA: CPT

## 2018-07-11 PROCEDURE — 99232 SBSQ HOSP IP/OBS MODERATE 35: CPT | Mod: GC

## 2018-07-11 PROCEDURE — 99285 EMERGENCY DEPT VISIT HI MDM: CPT | Mod: 25

## 2018-07-11 PROCEDURE — 82150 ASSAY OF AMYLASE: CPT

## 2018-07-11 PROCEDURE — 93005 ELECTROCARDIOGRAM TRACING: CPT

## 2018-07-11 PROCEDURE — 80076 HEPATIC FUNCTION PANEL: CPT

## 2018-07-11 RX ORDER — LEVOTHYROXINE SODIUM 125 MCG
1 TABLET ORAL
Qty: 0 | Refills: 0 | COMMUNITY

## 2018-07-11 RX ORDER — PANTOPRAZOLE SODIUM 20 MG/1
1 TABLET, DELAYED RELEASE ORAL
Qty: 0 | Refills: 0 | COMMUNITY

## 2018-07-11 RX ADMIN — OXYCODONE AND ACETAMINOPHEN 2 TABLET(S): 5; 325 TABLET ORAL at 14:29

## 2018-07-11 RX ADMIN — OXYCODONE AND ACETAMINOPHEN 2 TABLET(S): 5; 325 TABLET ORAL at 06:33

## 2018-07-11 RX ADMIN — OXYCODONE AND ACETAMINOPHEN 2 TABLET(S): 5; 325 TABLET ORAL at 02:56

## 2018-07-11 RX ADMIN — OXYCODONE AND ACETAMINOPHEN 2 TABLET(S): 5; 325 TABLET ORAL at 11:30

## 2018-07-11 RX ADMIN — PANTOPRAZOLE SODIUM 40 MILLIGRAM(S): 20 TABLET, DELAYED RELEASE ORAL at 06:32

## 2018-07-11 RX ADMIN — OXYCODONE AND ACETAMINOPHEN 2 TABLET(S): 5; 325 TABLET ORAL at 10:32

## 2018-07-11 RX ADMIN — Medication 150 MICROGRAM(S): at 06:32

## 2018-07-11 RX ADMIN — OXYCODONE AND ACETAMINOPHEN 2 TABLET(S): 5; 325 TABLET ORAL at 07:30

## 2018-07-11 RX ADMIN — OXYCODONE AND ACETAMINOPHEN 2 TABLET(S): 5; 325 TABLET ORAL at 02:21

## 2018-07-11 NOTE — DISCHARGE NOTE ADULT - PATIENT PORTAL LINK FT
You can access the [facility] Patient Portal, offered by [facility], by registering with the following website: [email or web address]

## 2018-07-11 NOTE — DISCHARGE NOTE ADULT - PLAN OF CARE
Tolerating diet .g Follow up with Dr. Fowler in 1-2 weeks. Call the office at the number below to schedule your appointment. You may shower; soap and water over incision sites. Do not scrub. Pat dry when done. No tub bathing or swimming until cleared. Ambulate as tolerated, but no heavy lifting (>10lbs) or strenuous exercise. You may resume regular diet. You should be urinating at least 3-4x per day. Call the office if you experience increasing abdominal pain, nausea, vomiting, or temperature >101 F. Follow up with Dr. Fowler in 1-2 weeks. Call the office at the number below to schedule your appointment. Ambulate as tolerated, but no heavy lifting (>10lbs) or strenuous exercise until otherwise instructed. You may resume regular diet. You should be urinating at least 3-4x per day. Call the office if you experience increasing abdominal pain, nausea, vomiting, or temperature >101 F.  For pain, you may take over-the-counter Tylenol as labeled. For breakthrough pain you may take Percocet, which contains Tylenol. Do NOT exceed 4000mg acetaminophen/Tylenol total within 24 hours. Do NOT take Advil, Motrin, or NSAIDs. Please take Colace 1 tab twice daily while taking narcotic pain medication to avoid constipation.

## 2018-07-11 NOTE — DISCHARGE NOTE ADULT - CARE PROVIDER_API CALL
Flora Fowler), Surgery  [address], [place] [address]  Phone: [phone]  Fax: [phone]

## 2018-07-11 NOTE — PROGRESS NOTE ADULT - SUBJECTIVE AND OBJECTIVE BOX
SUBJECTIVE: Patient seen and examined bedside by chief resident.    heparin  flush 100 Units/mL Injectable 100 Unit(s) IV Push every other day  heparin  Injectable 5000 Unit(s) SubCutaneous every 8 hours      Vital Signs Last 24 Hrs  T(C): 36.9 (11 Jul 2018 05:24), Max: 37.2 (10 Jul 2018 15:00)  T(F): 98.5 (11 Jul 2018 05:24), Max: 98.9 (10 Jul 2018 15:00)  HR: 68 (11 Jul 2018 05:24) (68 - 93)  BP: 112/73 (11 Jul 2018 05:24) (112/73 - 118/79)  BP(mean): --  RR: 17 (11 Jul 2018 05:24) (16 - 17)  SpO2: 97% (11 Jul 2018 05:24) (96% - 98%)  I&O's Detail    10 Jul 2018 07:01  -  11 Jul 2018 07:00  --------------------------------------------------------  IN:    Oral Fluid: 240 mL  Total IN: 240 mL    OUT:    Voided: 1350 mL  Total OUT: 1350 mL    Total NET: -1110 mL          General: NAD, resting comfortably in bed  C/V: NSR  Pulm: Nonlabored breathing, no respiratory distress  Abd: soft, NT/ND      LABS:                        10.0   8.8   )-----------( 525      ( 11 Jul 2018 07:04 )             31.1     07-11    134<L>  |  95<L>  |  16  ----------------------------<  91  4.5   |  26  |  0.78    Ca    9.5      11 Jul 2018 07:04  Phos  4.2     07-11  Mg     2.0     07-11    TPro  7.5  /  Alb  2.8<L>  /  TBili  0.6  /  DBili  x   /  AST  27  /  ALT  50<H>  /  AlkPhos  86  07-11

## 2018-07-11 NOTE — DISCHARGE NOTE ADULT - CARE PLAN
Goal:	Tolerating diet  Assessment and plan of treatment:	.g Goal:	Tolerating diet  Assessment and plan of treatment:	Follow up with Dr. Fowler in 1-2 weeks. Call the office at the number below to schedule your appointment. You may shower; soap and water over incision sites. Do not scrub. Pat dry when done. No tub bathing or swimming until cleared. Ambulate as tolerated, but no heavy lifting (>10lbs) or strenuous exercise. You may resume regular diet. You should be urinating at least 3-4x per day. Call the office if you experience increasing abdominal pain, nausea, vomiting, or temperature >101 F. Principal Discharge DX:	Gallstone pancreatitis  Goal:	Tolerating diet  Assessment and plan of treatment:	Follow up with Dr. Fowler in 1-2 weeks. Call the office at the number below to schedule your appointment. You may shower; soap and water over incision sites. Do not scrub. Pat dry when done. No tub bathing or swimming until cleared. Ambulate as tolerated, but no heavy lifting (>10lbs) or strenuous exercise. You may resume regular diet. You should be urinating at least 3-4x per day. Call the office if you experience increasing abdominal pain, nausea, vomiting, or temperature >101 F. Principal Discharge DX:	Gallstone pancreatitis  Goal:	Tolerating diet  Assessment and plan of treatment:	Follow up with Dr. Fowler in 1-2 weeks. Call the office at the number below to schedule your appointment. Ambulate as tolerated, but no heavy lifting (>10lbs) or strenuous exercise until otherwise instructed. You may resume regular diet. You should be urinating at least 3-4x per day. Call the office if you experience increasing abdominal pain, nausea, vomiting, or temperature >101 F.  For pain, you may take over-the-counter Tylenol as labeled. For breakthrough pain you may take Percocet, which contains Tylenol. Do NOT exceed 4000mg acetaminophen/Tylenol total within 24 hours. Do NOT take Advil, Motrin, or NSAIDs. Please take Colace 1 tab twice daily while taking narcotic pain medication to avoid constipation.

## 2018-07-11 NOTE — PROGRESS NOTE ADULT - PROVIDER SPECIALTY LIST ADULT
Gastroenterology
SICU
Surgery
Gastroenterology
Gastroenterology
Surgery

## 2018-07-11 NOTE — PROGRESS NOTE ADULT - ASSESSMENT
58M hx duodenitis/gastritis, gallstones, admitted with acute pancreatitis, suspect possible gallstone pancreatitis now s/p ERCP w/ sphincterotomy 6/21.     Pain/nausea control  OOBA/IS  Soft low fat diet  Home synthroid  SCDs/SQH  Dc PICC, Dc home

## 2018-07-11 NOTE — PROGRESS NOTE ADULT - ATTENDING COMMENTS
Patient seen and examined at bedside.  Feeling slightly improved.  +flatus/BMs overnight and today.  Abdomen soft, slightly improved distention, NT.  Labs reviewed    AXR to ensure NGT is not post-pyloric  Awaiting decrease in distention and decrease in NGT output prior to removal  Will need to consider nutritional support if unable to remove NGT in next 48 hours
Patient seen and examined at bedside.  Tolerating diet better today.  Abdomen softly distended.    Discharge home today if tolerating breakfast/lunch
Patient seen and examined at bedside.  Tolerating diet better today.  Abdomen softly distended.    Wean TPN  Calorie count to ensure patient able to take enough PO.
Patient seen and examined at bedside.  Tolerating diet well, although feels like he "overate" today and now feeling somewhat more bloated.  Abdomen soft, somewhat more distended today than yesterday.    TPN off  Calorie count to ensure patient able to take enough PO.
Patient was seen and examined with the GI fellow, Dr. Ferrer. I agree with the excellent assessment and plan as outlined above.    We will continue to follow along with you. Please don't hesitate to contact us with any additional questions.
Patient seen and examined at bedside.  Slowly feeling better.  +BM/flatus.  Abdomen soft, distended, mild tenderness in epigastrium but overall improving.    -NPO, NGT, TPN  -As CT abdomen/pelvis did not show drainable fluid collections, pancreatic aspiration would be unlikely to be helpful  -Will repeat CT Friday or Saturday to evaluate progression of pancreatitis
Patient seen and examined at bedside.  Slowly feeling better.  +BM/flatus.  Abdomen soft, distended, mild tenderness in epigastrium but overall improving.    -NPO, NGT, TPN  -OOB/ambulation as tolerated  -Pain control
Patient seen and examined at bedside.  T 101.4, NGT output remains high.    Abdomen soft, distended, NT.    CT reviewed, possible pancreatic necrosis.  Significant ileus.  No drainable fluid collections.    -NPO/NGT  -PICC line for TPN  -No role for antibiotics or IR drainage (no collections) at this time

## 2018-07-12 PROBLEM — K27.9 PEPTIC ULCER, SITE UNSPECIFIED, UNSPECIFIED AS ACUTE OR CHRONIC, WITHOUT HEMORRHAGE OR PERFORATION: Chronic | Status: ACTIVE | Noted: 2018-06-18

## 2018-07-12 PROBLEM — Z00.00 ENCOUNTER FOR PREVENTIVE HEALTH EXAMINATION: Status: ACTIVE | Noted: 2018-07-12

## 2018-07-17 DIAGNOSIS — K56.7 ILEUS, UNSPECIFIED: ICD-10-CM

## 2018-07-17 DIAGNOSIS — N17.0 ACUTE KIDNEY FAILURE WITH TUBULAR NECROSIS: ICD-10-CM

## 2018-07-17 DIAGNOSIS — A41.9 SEPSIS, UNSPECIFIED ORGANISM: ICD-10-CM

## 2018-07-17 DIAGNOSIS — E03.9 HYPOTHYROIDISM, UNSPECIFIED: ICD-10-CM

## 2018-07-17 DIAGNOSIS — K29.80 DUODENITIS WITHOUT BLEEDING: ICD-10-CM

## 2018-07-17 DIAGNOSIS — K85.10 BILIARY ACUTE PANCREATITIS WITHOUT NECROSIS OR INFECTION: ICD-10-CM

## 2018-07-17 DIAGNOSIS — F17.200 NICOTINE DEPENDENCE, UNSPECIFIED, UNCOMPLICATED: ICD-10-CM

## 2018-07-17 DIAGNOSIS — R14.0 ABDOMINAL DISTENSION (GASEOUS): ICD-10-CM

## 2018-07-17 DIAGNOSIS — K29.70 GASTRITIS, UNSPECIFIED, WITHOUT BLEEDING: ICD-10-CM

## 2018-07-17 DIAGNOSIS — E86.0 DEHYDRATION: ICD-10-CM

## 2018-07-30 ENCOUNTER — APPOINTMENT (OUTPATIENT)
Dept: SURGERY | Facility: CLINIC | Age: 58
End: 2018-07-30
Payer: COMMERCIAL

## 2018-07-30 VITALS
HEART RATE: 80 BPM | TEMPERATURE: 97.8 F | WEIGHT: 221.25 LBS | DIASTOLIC BLOOD PRESSURE: 86 MMHG | BODY MASS INDEX: 28.7 KG/M2 | SYSTOLIC BLOOD PRESSURE: 133 MMHG | HEIGHT: 73.5 IN | OXYGEN SATURATION: 97 %

## 2018-07-30 DIAGNOSIS — Z87.891 PERSONAL HISTORY OF NICOTINE DEPENDENCE: ICD-10-CM

## 2018-07-30 DIAGNOSIS — F10.21 ALCOHOL DEPENDENCE, IN REMISSION: ICD-10-CM

## 2018-07-30 DIAGNOSIS — Z86.39 PERSONAL HISTORY OF OTHER ENDOCRINE, NUTRITIONAL AND METABOLIC DISEASE: ICD-10-CM

## 2018-07-30 DIAGNOSIS — K85.10 BILIARY ACUTE PANCREATITIS WITHOUT NECROSIS OR INFECTION: ICD-10-CM

## 2018-07-30 DIAGNOSIS — K27.9 PEPTIC ULCER, SITE UNSPECIFIED, UNSPECIFIED AS ACUTE OR CHRONIC, W/OUT HEMORRHAGE OR PERFORATION: ICD-10-CM

## 2018-07-30 DIAGNOSIS — F12.90 CANNABIS USE, UNSPECIFIED, UNCOMPLICATED: ICD-10-CM

## 2018-07-30 DIAGNOSIS — Z83.79 FAMILY HISTORY OF OTHER DISEASES OF THE DIGESTIVE SYSTEM: ICD-10-CM

## 2018-07-30 DIAGNOSIS — K42.9 UMBILICAL HERNIA W/OUT OBSTRUCTION OR GANGRENE: ICD-10-CM

## 2018-07-30 DIAGNOSIS — Z87.898 PERSONAL HISTORY OF OTHER SPECIFIED CONDITIONS: ICD-10-CM

## 2018-07-30 PROCEDURE — 99214 OFFICE O/P EST MOD 30 MIN: CPT

## 2018-07-30 RX ORDER — LEVOTHYROXINE SODIUM 150 UG/1
150 TABLET ORAL
Refills: 0 | Status: ACTIVE | COMMUNITY

## 2018-07-31 PROBLEM — Z87.891 FORMER SMOKER: Status: ACTIVE | Noted: 2018-07-30

## 2018-07-31 PROBLEM — Z86.39 HISTORY OF HYPOTHYROIDISM: Status: RESOLVED | Noted: 2018-07-31 | Resolved: 2018-07-31

## 2018-07-31 PROBLEM — K27.9 PEPTIC ULCER DISEASE: Status: ACTIVE | Noted: 2018-07-31

## 2018-07-31 PROBLEM — Z83.79 FAMILY HISTORY OF PANCREATITIS: Status: ACTIVE | Noted: 2018-07-31

## 2018-07-31 PROBLEM — Z87.898 FORMER CONSUMPTION OF ALCOHOL: Status: ACTIVE | Noted: 2018-07-31

## 2018-07-31 PROBLEM — F12.90 MARIJUANA USE, EPISODIC: Status: ACTIVE | Noted: 2018-07-31

## 2018-07-31 PROBLEM — F10.21 HISTORY OF ALCOHOLISM: Status: RESOLVED | Noted: 2018-07-30 | Resolved: 2018-07-31

## 2018-09-10 ENCOUNTER — APPOINTMENT (OUTPATIENT)
Dept: SURGERY | Facility: CLINIC | Age: 58
End: 2018-09-10

## 2018-10-04 VITALS
DIASTOLIC BLOOD PRESSURE: 68 MMHG | TEMPERATURE: 97 F | HEIGHT: 74 IN | RESPIRATION RATE: 18 BRPM | OXYGEN SATURATION: 98 % | WEIGHT: 216.49 LBS | SYSTOLIC BLOOD PRESSURE: 119 MMHG | HEART RATE: 59 BPM

## 2018-10-04 NOTE — ASU PATIENT PROFILE, ADULT - PMH
Pancreatitis  2/2 gallstones  PUD (peptic ulcer disease) Hypothyroid    Pancreatitis  2/2 gallstones  PUD (peptic ulcer disease)

## 2018-10-05 ENCOUNTER — RESULT REVIEW (OUTPATIENT)
Age: 58
End: 2018-10-05

## 2018-10-05 ENCOUNTER — OUTPATIENT (OUTPATIENT)
Dept: OUTPATIENT SERVICES | Facility: HOSPITAL | Age: 58
LOS: 1 days | Discharge: HOME | End: 2018-10-05
Payer: COMMERCIAL

## 2018-10-05 ENCOUNTER — APPOINTMENT (OUTPATIENT)
Dept: SURGERY | Facility: HOSPITAL | Age: 58
End: 2018-10-05

## 2018-10-05 VITALS
HEART RATE: 54 BPM | DIASTOLIC BLOOD PRESSURE: 80 MMHG | RESPIRATION RATE: 16 BRPM | OXYGEN SATURATION: 91 % | SYSTOLIC BLOOD PRESSURE: 133 MMHG

## 2018-10-05 DIAGNOSIS — Z98.890 OTHER SPECIFIED POSTPROCEDURAL STATES: Chronic | ICD-10-CM

## 2018-10-05 PROCEDURE — 49585: CPT | Mod: GC

## 2018-10-05 PROCEDURE — 47562 LAPAROSCOPIC CHOLECYSTECTOMY: CPT | Mod: GC

## 2018-10-05 PROCEDURE — 88304 TISSUE EXAM BY PATHOLOGIST: CPT

## 2018-10-05 PROCEDURE — 47562 LAPAROSCOPIC CHOLECYSTECTOMY: CPT

## 2018-10-05 RX ORDER — SODIUM CHLORIDE 9 MG/ML
1000 INJECTION, SOLUTION INTRAVENOUS
Qty: 0 | Refills: 0 | Status: DISCONTINUED | OUTPATIENT
Start: 2018-10-05 | End: 2018-10-05

## 2018-10-05 RX ORDER — OXYCODONE AND ACETAMINOPHEN 5; 325 MG/1; MG/1
2 TABLET ORAL EVERY 6 HOURS
Qty: 0 | Refills: 0 | Status: DISCONTINUED | OUTPATIENT
Start: 2018-10-05 | End: 2018-10-05

## 2018-10-05 RX ORDER — ONDANSETRON 8 MG/1
4 TABLET, FILM COATED ORAL EVERY 6 HOURS
Qty: 0 | Refills: 0 | Status: DISCONTINUED | OUTPATIENT
Start: 2018-10-05 | End: 2018-10-05

## 2018-10-05 RX ORDER — OXYCODONE AND ACETAMINOPHEN 5; 325 MG/1; MG/1
1 TABLET ORAL EVERY 4 HOURS
Qty: 0 | Refills: 0 | Status: DISCONTINUED | OUTPATIENT
Start: 2018-10-05 | End: 2018-10-05

## 2018-10-05 RX ORDER — HYDROMORPHONE HYDROCHLORIDE 2 MG/ML
0.5 INJECTION INTRAMUSCULAR; INTRAVENOUS; SUBCUTANEOUS
Qty: 0 | Refills: 0 | Status: DISCONTINUED | OUTPATIENT
Start: 2018-10-05 | End: 2018-10-05

## 2018-10-05 RX ORDER — KETOROLAC TROMETHAMINE 30 MG/ML
15 SYRINGE (ML) INJECTION ONCE
Qty: 0 | Refills: 0 | Status: DISCONTINUED | OUTPATIENT
Start: 2018-10-05 | End: 2018-10-05

## 2018-10-05 RX ADMIN — HYDROMORPHONE HYDROCHLORIDE 0.5 MILLIGRAM(S): 2 INJECTION INTRAMUSCULAR; INTRAVENOUS; SUBCUTANEOUS at 09:53

## 2018-10-05 RX ADMIN — HYDROMORPHONE HYDROCHLORIDE 0.5 MILLIGRAM(S): 2 INJECTION INTRAMUSCULAR; INTRAVENOUS; SUBCUTANEOUS at 10:19

## 2018-10-05 NOTE — BRIEF OPERATIVE NOTE - OPERATION/FINDINGS
Pt placed in reverse Trendelenburg w/ right side up. Omental attachments to GB were gently swept away. GB fundus retracted superiorly over dome of liver. GB infundibulum retracted laterally & cystic duct & artery dissected. Critical view of safety obtained. Cystic duct and artery clipped and divided. Peritoneal attachments btw GB & liver bed  w/ electrocautery. Hemostasis achieved. No leakage of bile from cystic duct stump. Umbilical hernia repaired w/ Maxon sutures x 2.

## 2018-10-05 NOTE — BRIEF OPERATIVE NOTE - PROCEDURE
<<-----Click on this checkbox to enter Procedure Laparoscopic cholecystectomy  10/05/2018    Active  [id number]

## 2018-10-05 NOTE — PACU DISCHARGE NOTE - COMMENTS
Patient meets criteria for discharge to home. Pain level well controlled at this time. VSS. Abdominal bandaides in place X 4 Tolerated po diet . Voided. Ambulated safely IV discontinued. Instructions and copy of provider information given to patient and family.. Discharged to lobby with wife.

## 2018-10-09 LAB — SURGICAL PATHOLOGY STUDY: SIGNIFICANT CHANGE UP

## 2018-10-10 PROBLEM — E03.9 HYPOTHYROIDISM, UNSPECIFIED: Chronic | Status: ACTIVE | Noted: 2018-10-05

## 2018-10-10 PROBLEM — K85.90 ACUTE PANCREATITIS WITHOUT NECROSIS OR INFECTION, UNSPECIFIED: Chronic | Status: ACTIVE | Noted: 2018-10-04

## 2018-10-29 ENCOUNTER — APPOINTMENT (OUTPATIENT)
Dept: SURGERY | Facility: CLINIC | Age: 58
End: 2018-10-29

## 2019-06-04 NOTE — PATIENT PROFILE ADULT. - CAREGIVER ADDRESS
6/4/2019 TRIAGE: The patient reports that for the past 3 days, she has \" not felt well\". She has had more fatigue, indigestion and vomited over the weekend.  She attributed this to something she ate. She has upper back pain between her shoulder blades which she rates a \"4/10\". Over the past 2 hours, she has developed  \"heart fluttering\" and she feels intermittently lightheaded. Her blood pressure this morning was 190/90 HR 67. She states that she has taken all her prescribed medications this morning. She admits that she is not certain if her anxiety is contributing to her symptoms.    She reports that her symptoms are similar to the symptoms that she experienced prior to her coronary artery stenting 8/25/17. She states that the last time that she felt heart fluttering was directly prior to her cardiac catheterization. Currently, she is driving and does not have her nitroglycerin with her.    PLAN Advised ED evaluation now. She prefers not to pull over and call 911. She will go now. No further questions or concerns.    NM Stress 10/2018  IMPRESSION:  1.  Stable myocardial perfusion scan since 11/13/17. No new defects. Stable  predominantly fixed defects involving anterior, anteroseptal, and probably  inferolateral walls.   2.  Preserved left ventricular systolic function.  3. Cardiac Risk Assessment: Low.     Cardiac Cath 8/25/17  39F w/ hx of CABG w/ SVG to LAD w/ severe stenosis of SVG to LAD graft:   Underwent PCI w/ MADIHA x3 to mid to proximal LAD w/ 2.5X38 mm Synergy and 2.5 x38 mm Synergy in mid LAD, and 3.0x24 mm Synergy proximal LAD. Angioplasty performed to distal Left main coronary antery. Perclose deployed   PLAN:   -continue DAPT w/ ASA and plavix  -continue secondary CAD prevention  -4 hr bedrest  -OK to DC tomorrow  -consideration for possible occluder device placement to SVG to LAD can be considered in future if symptoms persist.    
Noted.   
[address] [facility] [address], [place], [id number]

## 2020-09-17 NOTE — PATIENT PROFILE ADULT. - ARE SIGNIFICANT INDICATORS COMPLETE.
Initially antihypertensives held to allow for permissive hypertension and then due soft blood pressures  Blood pressures have improved    Diovan substituted with Cozaar while here and started at a lower dose   Blood pressure has been  is stable Yes

## 2020-09-19 NOTE — PROCEDURE NOTE - PROCEDURE DATE TIME, MLM
History of DM2 controlled with metformin  -No A1C on file, but relatively well controlled    Plan:  -hold metformin for admission  -controlled, dc protocol/SSI   27-Jun-2018 01:18

## 2021-08-09 NOTE — ASU PATIENT PROFILE, ADULT - PATIENT REPRESENTATIVE PHONE
Time of visit:    CHIEF COMPLAINT: Patient is a 65y old  Male who presents with a chief complaint of Shortness of breath (09 Aug 2021 11:59)      HPI:  Patient is a 66 y/o Male with medical hx significant for HTN, HLD, DM, CAD with triple vessel bypass, PAD s/o Right LE stent, and Active smoker presenting to the ED due to shortness of breath since early this morning. He had an episode of shortness of breath on Tuesday morning, pt and pt's wife attributed it to pt's hx of Anxiety. However, today morning, he woke up around 4 am saying "I can't breath" prompting ED admission. Pt also associated with cough and minimal sputum production (clear or white in color, not blood tinged or streaked). Pt complains of chest tightness, not chest pain. Pt and pt's wife attended a gathering with about 30 people in close proximity yesterday, both pt and pt's wife are vaccinated. No hx of sick contacts, fevers, leg swelling, palpitations.  Positive cardiac hx in pt's father who passed away due to MI. Pt is able to ambulate about 4-5 blocks at baseline, however, now able to walk around the house but with difficulty.    In the ED, pt's Cxray showed evidence of increased markings in RLL  CT Angio showing moderate emphysema, moderate pulm HTN   No pulm embolus   Trop 0.973-> 2.040, no ischemic changes  S/p Full dose Lovenox (08 Aug 2021 20:08)   Patient seen and examined.     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 ago    S/P CABG x 3   with Dr Oshea        Allergies    iodinated radiocontrast agents (Rhinitis)  penicillin (Short breath; Rash)  penicillins (Anaphylaxis)    Intolerances        MEDICATIONS  (STANDING):  albuterol/ipratropium for Nebulization 3 milliLiter(s) Nebulizer every 6 hours  aspirin enteric coated 81 milliGRAM(s) Oral daily  azithromycin  IVPB 500 milliGRAM(s) IV Intermittent every 24 hours  budesonide  80 MICROgram(s)/formoterol 4.5 MICROgram(s) Inhaler 2 Puff(s) Inhalation two times a day  cefTRIAXone   IVPB 1000 milliGRAM(s) IV Intermittent every 24 hours  clopidogrel Tablet 75 milliGRAM(s) Oral daily  heparin  Infusion.  Unit(s)/Hr (9.5 mL/Hr) IV Continuous <Continuous>  insulin glargine Injectable (LANTUS) 10 Unit(s) SubCutaneous at bedtime  insulin lispro (ADMELOG) corrective regimen sliding scale   SubCutaneous at bedtime  insulin lispro (ADMELOG) corrective regimen sliding scale   SubCutaneous Before meals and at bedtime  insulin lispro Injectable (ADMELOG) 3 Unit(s) SubCutaneous three times a day before meals  isosorbide   mononitrate ER Tablet (IMDUR) 30 milliGRAM(s) Oral daily  lisinopril 40 milliGRAM(s) Oral daily  methylPREDNISolone sodium succinate Injectable 40 milliGRAM(s) IV Push two times a day  metoprolol tartrate 25 milliGRAM(s) Oral two times a day  nicotine -  14 mG/24Hr(s) Patch 1 patch Transdermal daily  pantoprazole    Tablet 40 milliGRAM(s) Oral before breakfast      MEDICATIONS  (PRN):  ALBUTerol    90 MICROgram(s) HFA Inhaler 2 Puff(s) Inhalation every 6 hours PRN Shortness of Breath and/or Wheezing   Medications up to date at time of exam.    Medications up to date at time of exam.    FAMILY HISTORY:  Family history of acute myocardial infarction  father  massive MI at 52yo        SOCIAL HISTORY  Smoking History: Active smoker ( last smoke Saturday , smoking 1/2 a pack daily )  Living Condition: [   ] apartment, [   ] private house  Work History:   Travel History: denies recent travel  Illicit Substance Use: denies  Alcohol Use: denies    REVIEW OF SYSTEMS:    CONSTITUTIONAL:  No fevers, chills. Denies Night sweats.     HEENT:  Denies  blurred vision, sore throat or runny nose.    CARDIOVASCULAR:  Denies chest discomfort on exam. No palpitations.    RESPIRATORY:  Episodic SOB on exertion. Non productive cough .    GASTROINTESTINAL:  Denies abdominal pain, nausea, vomiting or diarrhea.    GENITOURINARY: Denies dysuria, frequency or urgency.    NEUROLOGIC:  Denies numbness, tingling, seizures or weakness.    PSYCHIATRIC:  No emotional distress on exam.     PHYSICAL EXAMINATION:    GENERAL: Alert and oriented. No acute distress.     Vital Signs Last 24 Hrs  T(C): 36.8 (09 Aug 2021 11:12), Max: 37.7 (09 Aug 2021 07:28)  T(F): 98.2 (09 Aug 2021 11:12), Max: 99.9 (09 Aug 2021 07:28)  HR: 77 (09 Aug 2021 11:12) (72 - 90)  BP: 122/65 (09 Aug 2021 11:12) (114/59 - 134/60)  BP(mean): --  RR: 18 (09 Aug 2021 11:12) (18 - 26)  SpO2: 94% (09 Aug 2021 11:12) (94% - 99%)   (if applicable)      HEENT: Head is normocephalic and atraumatic. No nasal tenderness. Extraocular muscles are intact. Mucous membranes are moist.     NECK: Supple, no palpable adenopathy.    LUNGS: Decreased breath sounds, otherwise clear to auscultation with no wheezing, rales, or rhonchi. No use of accessory muscle.     HEART: S1 S2 Regular rate and no click/ rub.     ABDOMEN: Soft, nontender, and nondistended.  No hepatosplenomegaly is noted. Active bowel sounds.     EXTREMITIES: Without any cyanosis, clubbing, rash, lesions or edema.    NEUROLOGIC: Awake, alert, oriented.     SKIN: Warm and moist. Non diaphoretic.       LABS:                        14.7   15.53 )-----------( 259      ( 09 Aug 2021 08:47 )             44.3     08-09    139  |  104  |  26<H>  ----------------------------<  294<H>  4.9   |  25  |  1.15    Ca    9.4      09 Aug 2021 08:47    TPro  7.1  /  Alb  3.6  /  TBili  0.6  /  DBili  x   /  AST  38  /  ALT  44  /  AlkPhos  84  08-08        ABG - ( 09 Aug 2021 11:34 )  pH, Arterial: 7.45  pH, Blood: x     /  pCO2: 31    /  pO2: 62    / HCO3: 22    / Base Excess: -1.6  /  SaO2: 94                CARDIAC MARKERS ( 09 Aug 2021 08:47 )  5.360 ng/mL / x     / 95 U/L / x     / 17.2 ng/mL  CARDIAC MARKERS ( 09 Aug 2021 01:04 )  5.370 ng/mL / x     / 81 U/L / x     / 17.3 ng/mL  CARDIAC MARKERS ( 08 Aug 2021 20:23 )  3.590 ng/mL / x     / x     / x     / x      CARDIAC MARKERS ( 08 Aug 2021 14:16 )  2.040 ng/mL / x     / x     / x     / x      CARDIAC MARKERS ( 08 Aug 2021 07:21 )  0.973 ng/mL / x     / x     / x     / x            Serum Pro-Brain Natriuretic Peptide: 3195 pg/mL (21 @ 07:21)      Procalcitonin, Serum: 0.11 ng/mL (21 @ 03:44)      MICROBIOLOGY: (if applicable)    RADIOLOGY & ADDITIONAL STUDIES:  EKG:   CXR:  ECHO:    IMPRESSION: 65y Male PAST MEDICAL & SURGICAL HISTORY:  DM (Diabetes Mellitus)    HTN (Hypertension)    Hyperlipidemia    Coronary Atherosclerosis of Native Coronary Artery    CAD (coronary artery disease)    Smoker    BPH (benign prostatic hyperplasia)    Anxiety    PAD (peripheral artery disease)    HTN (hypertension)    History of Tonsillectomy    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 ago    S/P CABG x 3  2014 with Dr Oshea     Impression: 66 Y/O male presented to ED with shortness of breath since early this morning. He had an episode of shortness of breath on Tuesday morning. However, today morning, he woke up around 4 am saying "I can't breath" prompting ED admission. Pt also associated with cough and minimal sputum production (clear or white in color, not blood tinged or streaked). Pt complains of chest tightness, not chest pain. Pt and pt's wife attended a gathering with about 30 people in close proximity yesterday, both pt and pt's wife are vaccinated. Admitted with Acute Hypoxic Respiratory Failure secondary to found on CXR with RLL opacity and on CT Chest no PE , Small bilateral pleural effusions. Moderate Emphysema and Moderate pHTN. Negative Covid 19 PCR on 21.     Suggestion:  O2 saturation 88% room air at rest. With Oxygen supplementation with O2 saturation 94%. Continue Oxygen supplementation 6LNC .   Continue Azithromycin and Ceftriaxone IVPB Daily.  Continue Duoneb via nebulization Q 6 Hours. Do Not give at the same time with Duoneb nebulization the PRN Albuterol 2 Puffs Q 6 Hours.    Continue Budesonide 2 Puffs Twice daily.   Continue Solumedrol 40 mg Twice daily.   DVT/ GI prophylactic .   Reinforced the importance of smoking cessation and on Nicotine patch.      Time of visit:    CHIEF COMPLAINT: Patient is a 65y old  Male who presents with a chief complaint of Shortness of breath (09 Aug 2021 11:59)      HPI:  Patient is a 66 y/o Male with medical hx significant for HTN, HLD, DM, CAD with triple vessel bypass, PAD s/o Right LE stent, and Active smoker presenting to the ED due to shortness of breath since early this morning. He had an episode of shortness of breath on Tuesday morning, pt and pt's wife attributed it to pt's hx of Anxiety. However, today morning, he woke up around 4 am saying "I can't breath" prompting ED admission. Pt also associated with cough and minimal sputum production (clear or white in color, not blood tinged or streaked). Pt complains of chest tightness, not chest pain. Pt and pt's wife attended a gathering with about 30 people in close proximity yesterday, both pt and pt's wife are vaccinated. No hx of sick contacts, fevers, leg swelling, palpitations.  Positive cardiac hx in pt's father who passed away due to MI. Pt is able to ambulate about 4-5 blocks at baseline, however, now able to walk around the house but with difficulty.    In the ED, pt's Cxray showed evidence of increased markings in RLL  CT Angio showing moderate emphysema, moderate pulm HTN   No pulm embolus   Trop 0.973-> 2.040, no ischemic changes  S/p Full dose Lovenox (08 Aug 2021 20:08)   Patient seen and examined.     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 ago    S/P CABG x 3   with Dr Oshea        Allergies    iodinated radiocontrast agents (Rhinitis)  penicillin (Short breath; Rash)  penicillins (Anaphylaxis)    Intolerances        MEDICATIONS  (STANDING):  albuterol/ipratropium for Nebulization 3 milliLiter(s) Nebulizer every 6 hours  aspirin enteric coated 81 milliGRAM(s) Oral daily  azithromycin  IVPB 500 milliGRAM(s) IV Intermittent every 24 hours  budesonide  80 MICROgram(s)/formoterol 4.5 MICROgram(s) Inhaler 2 Puff(s) Inhalation two times a day  cefTRIAXone   IVPB 1000 milliGRAM(s) IV Intermittent every 24 hours  clopidogrel Tablet 75 milliGRAM(s) Oral daily  heparin  Infusion.  Unit(s)/Hr (9.5 mL/Hr) IV Continuous <Continuous>  insulin glargine Injectable (LANTUS) 10 Unit(s) SubCutaneous at bedtime  insulin lispro (ADMELOG) corrective regimen sliding scale   SubCutaneous at bedtime  insulin lispro (ADMELOG) corrective regimen sliding scale   SubCutaneous Before meals and at bedtime  insulin lispro Injectable (ADMELOG) 3 Unit(s) SubCutaneous three times a day before meals  isosorbide   mononitrate ER Tablet (IMDUR) 30 milliGRAM(s) Oral daily  lisinopril 40 milliGRAM(s) Oral daily  methylPREDNISolone sodium succinate Injectable 40 milliGRAM(s) IV Push two times a day  metoprolol tartrate 25 milliGRAM(s) Oral two times a day  nicotine -  14 mG/24Hr(s) Patch 1 patch Transdermal daily  pantoprazole    Tablet 40 milliGRAM(s) Oral before breakfast      MEDICATIONS  (PRN):  ALBUTerol    90 MICROgram(s) HFA Inhaler 2 Puff(s) Inhalation every 6 hours PRN Shortness of Breath and/or Wheezing   Medications up to date at time of exam.    Medications up to date at time of exam.    FAMILY HISTORY:  Family history of acute myocardial infarction  father  massive MI at 50yo        SOCIAL HISTORY  Smoking History: Active smoker ( last smoke Saturday , smoking 1/2 a pack daily )  Living Condition: [   ] apartment, [   ] private house  Work History:   Travel History: denies recent travel  Illicit Substance Use: denies  Alcohol Use: denies    REVIEW OF SYSTEMS:    CONSTITUTIONAL:  No fevers, chills. Denies Night sweats.     HEENT:  Denies  blurred vision, sore throat or runny nose.    CARDIOVASCULAR:  Denies chest discomfort on exam. No palpitations.    RESPIRATORY:  Episodic SOB on exertion. Non productive cough .    GASTROINTESTINAL:  Denies abdominal pain, nausea, vomiting or diarrhea.    GENITOURINARY: Denies dysuria, frequency or urgency.    NEUROLOGIC:  Denies numbness, tingling, seizures or weakness.    PSYCHIATRIC:  No emotional distress on exam.     PHYSICAL EXAMINATION:    GENERAL: Alert and oriented. No acute distress.     Vital Signs Last 24 Hrs  T(C): 36.8 (09 Aug 2021 11:12), Max: 37.7 (09 Aug 2021 07:28)  T(F): 98.2 (09 Aug 2021 11:12), Max: 99.9 (09 Aug 2021 07:28)  HR: 77 (09 Aug 2021 11:12) (72 - 90)  BP: 122/65 (09 Aug 2021 11:12) (114/59 - 134/60)  BP(mean): --  RR: 18 (09 Aug 2021 11:12) (18 - 26)  SpO2: 94% (09 Aug 2021 11:12) (94% - 99%)   (if applicable)      HEENT: Head is normocephalic and atraumatic. No nasal tenderness. Extraocular muscles are intact. Mucous membranes are moist.     NECK: Supple, no palpable adenopathy.    LUNGS: Decreased breath sounds, otherwise clear to auscultation with no wheezing, rales, or rhonchi. No use of accessory muscle.     HEART: S1 S2 Regular rate and no click/ rub.     ABDOMEN: Soft, nontender, and nondistended.  No hepatosplenomegaly is noted. Active bowel sounds.     EXTREMITIES: Without any cyanosis, clubbing, rash, lesions or edema.    NEUROLOGIC: Awake, alert, oriented.     SKIN: Warm and moist. Non diaphoretic.       LABS:                        14.7   15.53 )-----------( 259      ( 09 Aug 2021 08:47 )             44.3     08-09    139  |  104  |  26<H>  ----------------------------<  294<H>  4.9   |  25  |  1.15    Ca    9.4      09 Aug 2021 08:47    TPro  7.1  /  Alb  3.6  /  TBili  0.6  /  DBili  x   /  AST  38  /  ALT  44  /  AlkPhos  84  08-08        ABG - ( 09 Aug 2021 11:34 )  pH, Arterial: 7.45  pH, Blood: x     /  pCO2: 31    /  pO2: 62    / HCO3: 22    / Base Excess: -1.6  /  SaO2: 94                CARDIAC MARKERS ( 09 Aug 2021 08:47 )  5.360 ng/mL / x     / 95 U/L / x     / 17.2 ng/mL  CARDIAC MARKERS ( 09 Aug 2021 01:04 )  5.370 ng/mL / x     / 81 U/L / x     / 17.3 ng/mL  CARDIAC MARKERS ( 08 Aug 2021 20:23 )  3.590 ng/mL / x     / x     / x     / x      CARDIAC MARKERS ( 08 Aug 2021 14:16 )  2.040 ng/mL / x     / x     / x     / x      CARDIAC MARKERS ( 08 Aug 2021 07:21 )  0.973 ng/mL / x     / x     / x     / x            Serum Pro-Brain Natriuretic Peptide: 3195 pg/mL (21 @ 07:21)      Procalcitonin, Serum: 0.11 ng/mL (21 @ 03:44)      MICROBIOLOGY: (if applicable)    RADIOLOGY & ADDITIONAL STUDIES:  EKG:   CTPA: < from: CT Angio Chest PE Protocol w/ IV Cont (21 @ 13:23) >    EXAM:  CT ANGIO CHEST PULM [facility]                            PROCEDURE DATE:  2021          INTERPRETATION:  CLINICAL INFORMATION: 65-year-old man with shortness of breath and substernal chest pain. History of CABG and smoking.    COMPARISON: None.    CONTRAST/COMPLICATIONS:  IV Contrast: Omnipaque 350  50 cc administered   50 cc discarded  Oral Contrast: NONE  Complications: None reported at time of study completion    PROCEDURE:  CT Angiography of the Chest.  Sagittal and coronal reformats were performed as well as 3D (MIP) reconstructions.    FINDINGS:    LUNGS AND AIRWAYS: Patent central airways.  Moderately severe emphysema. Moderately extensive right lower lobe atelectasis. Subsegmental left lower lobe atelectasis. Interlobular septal thickening which may be secondary to pulmonary interstitial edema  PLEURA: Small bilateral pleural effusions.  MEDIASTINUM AND TREY: AP window lymph node 19 x 11 mm. Several additional small lymph nodes.  VESSELS: No pulmonary embolus. Main pulmonary artery dilated to 3.6 cm suggesting pulmonary arterial hypertension.  HEART: Heart size is borderline. No pericardial effusion. Coronary artery calcification.  CHEST WALL AND LOWER NECK: Within normal limits.  VISUALIZED UPPER ABDOMEN: 12 mmirregular hypodensity posterior liver dome.  BONES: Within normal limits.    IMPRESSION:  No pulmonary embolus.  Moderately severe emphysema.  Enlarged main pulmonary artery suggesting pulmonary arterial hypertension.  Appearance suggesting pulmonaryinterstitial edema        --- End of Report ---            VANIA ARAYA MD; Attending Radiologist  This document has been electronically signed. 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 ago    S/P CABG x 3   with Dr Oshea     Impression: This is a  66 Y/O male presented to ED with shortness of breath since early this morning. He had an episode of shortness of breath on Tuesday morning. However, today morning, he woke up around 4 am saying "I can't breath" prompting ED admission. Pt also associated with cough and minimal sputum production (clear or white in color, not blood tinged or streaked). Pt complains of chest tightness, not chest pain. Pt and pt's wife attended a gathering with about 30 people in close proximity yesterday, both pt and pt's wife are vaccinated. Admitted with Acute Hypoxic Respiratory Failure secondary to found on CXR with RLL opacity and on CT Chest no PE , Small bilateral pleural effusions. Moderate Emphysema and Moderate pHTN. Negative Covid 19 PCR on 21.  + NSTEMI     Suggestion:  O2 saturation 88% room air at rest. With Oxygen supplementation with O2 saturation 94%. Continue Oxygen supplementation 6LNC .   Continue Azithromycin and Ceftriaxone IVPB Daily.  Continue Duoneb via nebulization Q 6 Hours. Do Not give at the same time with Duoneb nebulization the PRN Albuterol 2 Puffs Q 6 Hours.    Continue Budesonide 2 Puffs Twice daily.   Continue Solumedrol 40 mg Twice daily.   DVT/ GI prophylactic .   Reinforced the importance of smoking cessation and on Nicotine patch.       Continue ASA, ACE- and Beta -  continue tele monitoring   cards f/u for further cardiac work up     Agree with above assessment and plan as transcribed.  [phone]

## 2022-02-14 NOTE — ED ADULT NURSE NOTE - PRO INTERPRETER NEED 2
English Cheek To Nose Interpolation Flap Division And Inset Text: Division and inset of the cheek to nose interpolation flap was performed to achieve optimal aesthetic result, restore normal anatomic appearance and avoid distortion of normal anatomy, expedite and facilitate wound healing, achieve optimal functional result and because linear closure either not possible or would produce suboptimal result. The patient was prepped and draped in the usual manner. The pedicle was infiltrated with local anesthesia. The pedicle was sectioned with a #15 blade. The pedicle was de-bulked and trimmed to match the shape of the defect. Hemostasis was achieved. The flap donor site and free margin of the flap were secured with deep buried sutures and the wound edges were re-approximated.